# Patient Record
Sex: MALE | URBAN - METROPOLITAN AREA
[De-identification: names, ages, dates, MRNs, and addresses within clinical notes are randomized per-mention and may not be internally consistent; named-entity substitution may affect disease eponyms.]

---

## 2018-09-05 ENCOUNTER — APPOINTMENT (RX ONLY)
Dept: URBAN - METROPOLITAN AREA CLINIC 56 | Facility: CLINIC | Age: 78
Setting detail: DERMATOLOGY
End: 2018-09-05

## 2018-09-05 DIAGNOSIS — L81.4 OTHER MELANIN HYPERPIGMENTATION: ICD-10-CM

## 2018-09-05 DIAGNOSIS — L57.8 OTHER SKIN CHANGES DUE TO CHRONIC EXPOSURE TO NONIONIZING RADIATION: ICD-10-CM

## 2018-09-05 DIAGNOSIS — L82.1 OTHER SEBORRHEIC KERATOSIS: ICD-10-CM

## 2018-09-05 DIAGNOSIS — D22 MELANOCYTIC NEVI: ICD-10-CM

## 2018-09-05 DIAGNOSIS — D18.0 HEMANGIOMA: ICD-10-CM

## 2018-09-05 DIAGNOSIS — Z71.89 OTHER SPECIFIED COUNSELING: ICD-10-CM

## 2018-09-05 PROBLEM — I10 ESSENTIAL (PRIMARY) HYPERTENSION: Status: ACTIVE | Noted: 2018-09-05

## 2018-09-05 PROBLEM — D22.61 MELANOCYTIC NEVI OF RIGHT UPPER LIMB, INCLUDING SHOULDER: Status: ACTIVE | Noted: 2018-09-05

## 2018-09-05 PROBLEM — M12.9 ARTHROPATHY, UNSPECIFIED: Status: ACTIVE | Noted: 2018-09-05

## 2018-09-05 PROBLEM — D18.01 HEMANGIOMA OF SKIN AND SUBCUTANEOUS TISSUE: Status: ACTIVE | Noted: 2018-09-05

## 2018-09-05 PROBLEM — H91.90 UNSPECIFIED HEARING LOSS, UNSPECIFIED EAR: Status: ACTIVE | Noted: 2018-09-05

## 2018-09-05 PROCEDURE — ? COUNSELING

## 2018-09-05 PROCEDURE — 99202 OFFICE O/P NEW SF 15 MIN: CPT

## 2018-09-05 ASSESSMENT — LOCATION DETAILED DESCRIPTION DERM
LOCATION DETAILED: RIGHT SUPERIOR UPPER BACK
LOCATION DETAILED: RIGHT ANTERIOR SHOULDER
LOCATION DETAILED: RIGHT ANTERIOR PROXIMAL UPPER ARM
LOCATION DETAILED: LEFT ANTERIOR SHOULDER
LOCATION DETAILED: EPIGASTRIC SKIN
LOCATION DETAILED: LEFT SUPERIOR UPPER BACK

## 2018-09-05 ASSESSMENT — LOCATION SIMPLE DESCRIPTION DERM
LOCATION SIMPLE: RIGHT UPPER ARM
LOCATION SIMPLE: RIGHT UPPER BACK
LOCATION SIMPLE: LEFT SHOULDER
LOCATION SIMPLE: LEFT UPPER BACK
LOCATION SIMPLE: RIGHT SHOULDER
LOCATION SIMPLE: ABDOMEN

## 2018-09-05 ASSESSMENT — LOCATION ZONE DERM
LOCATION ZONE: TRUNK
LOCATION ZONE: ARM

## 2020-01-21 ENCOUNTER — APPOINTMENT (RX ONLY)
Dept: URBAN - METROPOLITAN AREA CLINIC 58 | Facility: CLINIC | Age: 80
Setting detail: DERMATOLOGY
End: 2020-01-21

## 2020-01-21 DIAGNOSIS — D18.0 HEMANGIOMA: ICD-10-CM

## 2020-01-21 DIAGNOSIS — L57.0 ACTINIC KERATOSIS: ICD-10-CM

## 2020-01-21 DIAGNOSIS — D22 MELANOCYTIC NEVI: ICD-10-CM

## 2020-01-21 DIAGNOSIS — L82.1 OTHER SEBORRHEIC KERATOSIS: ICD-10-CM

## 2020-01-21 DIAGNOSIS — L81.4 OTHER MELANIN HYPERPIGMENTATION: ICD-10-CM

## 2020-01-21 PROBLEM — D18.01 HEMANGIOMA OF SKIN AND SUBCUTANEOUS TISSUE: Status: ACTIVE | Noted: 2020-01-21

## 2020-01-21 PROBLEM — D22.5 MELANOCYTIC NEVI OF TRUNK: Status: ACTIVE | Noted: 2020-01-21

## 2020-01-21 PROCEDURE — ? COUNSELING

## 2020-01-21 PROCEDURE — ? FULL BODY SKIN EXAM

## 2020-01-21 PROCEDURE — ? LIQUID NITROGEN

## 2020-01-21 PROCEDURE — 99213 OFFICE O/P EST LOW 20 MIN: CPT | Mod: 25

## 2020-01-21 PROCEDURE — 17000 DESTRUCT PREMALG LESION: CPT

## 2020-01-21 ASSESSMENT — LOCATION DETAILED DESCRIPTION DERM
LOCATION DETAILED: LEFT VENTRAL PROXIMAL FOREARM
LOCATION DETAILED: LEFT SUPERIOR TEMPLE
LOCATION DETAILED: EPIGASTRIC SKIN
LOCATION DETAILED: LEFT DISTAL PRETIBIAL REGION
LOCATION DETAILED: RIGHT DISTAL PRETIBIAL REGION
LOCATION DETAILED: RIGHT VENTRAL PROXIMAL FOREARM
LOCATION DETAILED: MIDDLE STERNUM

## 2020-01-21 ASSESSMENT — LOCATION ZONE DERM
LOCATION ZONE: FACE
LOCATION ZONE: ARM
LOCATION ZONE: LEG
LOCATION ZONE: TRUNK

## 2020-01-21 ASSESSMENT — LOCATION SIMPLE DESCRIPTION DERM
LOCATION SIMPLE: RIGHT FOREARM
LOCATION SIMPLE: LEFT TEMPLE
LOCATION SIMPLE: RIGHT PRETIBIAL REGION
LOCATION SIMPLE: CHEST
LOCATION SIMPLE: ABDOMEN
LOCATION SIMPLE: LEFT FOREARM
LOCATION SIMPLE: LEFT PRETIBIAL REGION

## 2020-01-21 NOTE — PROCEDURE: MIPS QUALITY
Quality 402: Tobacco Use And Help With Quitting Among Adolescents: Patient screened for tobacco and is an ex-smoker
Quality 110: Preventive Care And Screening: Influenza Immunization: Influenza Immunization Administered during Influenza season
Quality 111:Pneumonia Vaccination Status For Older Adults: Pneumococcal Vaccination Previously Received
Detail Level: Detailed
Quality 47: Advance Care Plan: Advance Care Planning discussed and documented; advance care plan or surrogate decision maker documented in the medical record.
Quality 130: Documentation Of Current Medications In The Medical Record: Current Medications Documented

## 2021-01-01 ENCOUNTER — APPOINTMENT (INPATIENT)
Dept: RADIOLOGY | Facility: HOSPITAL | Age: 81
DRG: 025 | End: 2021-01-01
Payer: MEDICARE

## 2021-01-01 ENCOUNTER — APPOINTMENT (EMERGENCY)
Dept: CT IMAGING | Facility: HOSPITAL | Age: 81
End: 2021-01-01
Payer: MEDICARE

## 2021-01-01 ENCOUNTER — ANESTHESIA EVENT (INPATIENT)
Dept: PERIOP | Facility: HOSPITAL | Age: 81
DRG: 025 | End: 2021-01-01
Payer: MEDICARE

## 2021-01-01 ENCOUNTER — HOSPITAL ENCOUNTER (INPATIENT)
Facility: HOSPITAL | Age: 81
LOS: 25 days | DRG: 025 | End: 2021-12-21
Attending: SURGERY | Admitting: SURGERY
Payer: MEDICARE

## 2021-01-01 ENCOUNTER — HOSPITAL ENCOUNTER (EMERGENCY)
Facility: HOSPITAL | Age: 81
End: 2021-11-26
Attending: EMERGENCY MEDICINE | Admitting: EMERGENCY MEDICINE
Payer: MEDICARE

## 2021-01-01 ENCOUNTER — ANESTHESIA (INPATIENT)
Dept: PERIOP | Facility: HOSPITAL | Age: 81
DRG: 025 | End: 2021-01-01
Payer: MEDICARE

## 2021-01-01 ENCOUNTER — ANESTHESIA EVENT (INPATIENT)
Dept: RADIOLOGY | Facility: HOSPITAL | Age: 81
DRG: 025 | End: 2021-01-01
Payer: MEDICARE

## 2021-01-01 ENCOUNTER — TELEPHONE (OUTPATIENT)
Dept: NEUROSURGERY | Facility: CLINIC | Age: 81
End: 2021-01-01

## 2021-01-01 ENCOUNTER — TELEPHONE (OUTPATIENT)
Dept: OTHER | Facility: OTHER | Age: 81
End: 2021-01-01

## 2021-01-01 ENCOUNTER — APPOINTMENT (INPATIENT)
Dept: RADIOLOGY | Facility: HOSPITAL | Age: 81
DRG: 025 | End: 2021-01-01
Attending: NEUROLOGICAL SURGERY
Payer: MEDICARE

## 2021-01-01 ENCOUNTER — ANESTHESIA (INPATIENT)
Dept: RADIOLOGY | Facility: HOSPITAL | Age: 81
DRG: 025 | End: 2021-01-01
Payer: MEDICARE

## 2021-01-01 ENCOUNTER — APPOINTMENT (EMERGENCY)
Dept: RADIOLOGY | Facility: HOSPITAL | Age: 81
End: 2021-01-01
Payer: MEDICARE

## 2021-01-01 VITALS
RESPIRATION RATE: 15 BRPM | TEMPERATURE: 97.7 F | SYSTOLIC BLOOD PRESSURE: 158 MMHG | HEART RATE: 62 BPM | OXYGEN SATURATION: 97 % | WEIGHT: 190.04 LBS | DIASTOLIC BLOOD PRESSURE: 91 MMHG

## 2021-01-01 VITALS
HEIGHT: 69 IN | SYSTOLIC BLOOD PRESSURE: 144 MMHG | DIASTOLIC BLOOD PRESSURE: 73 MMHG | TEMPERATURE: 96.6 F | BODY MASS INDEX: 22.37 KG/M2 | OXYGEN SATURATION: 98 % | RESPIRATION RATE: 17 BRPM | WEIGHT: 151.01 LBS | HEART RATE: 89 BPM

## 2021-01-01 DIAGNOSIS — R29.6 MULTIPLE FALLS: ICD-10-CM

## 2021-01-01 DIAGNOSIS — Z78.1 REQUIRES RESTRAINTS FOR SAFETY: Primary | ICD-10-CM

## 2021-01-01 DIAGNOSIS — R33.9 URINARY RETENTION: ICD-10-CM

## 2021-01-01 DIAGNOSIS — S06.5X9A SUBDURAL HEMATOMA (HCC): ICD-10-CM

## 2021-01-01 DIAGNOSIS — G93.40 ENCEPHALOPATHY ACUTE: ICD-10-CM

## 2021-01-01 DIAGNOSIS — S06.5X9A SDH (SUBDURAL HEMATOMA) (HCC): Primary | ICD-10-CM

## 2021-01-01 DIAGNOSIS — R31.9 HEMATURIA: ICD-10-CM

## 2021-01-01 LAB
ABO GROUP BLD: NORMAL
ABO GROUP BLD: NORMAL
ALBUMIN SERPL BCP-MCNC: 3.3 G/DL (ref 3.5–5)
ALBUMIN SERPL BCP-MCNC: 3.5 G/DL (ref 3.5–5)
ALP SERPL-CCNC: 91 U/L (ref 46–116)
ALP SERPL-CCNC: 97 U/L (ref 46–116)
ALT SERPL W P-5'-P-CCNC: 28 U/L (ref 12–78)
ALT SERPL W P-5'-P-CCNC: 29 U/L (ref 12–78)
ANION GAP SERPL CALCULATED.3IONS-SCNC: 12 MMOL/L (ref 4–13)
ANION GAP SERPL CALCULATED.3IONS-SCNC: 3 MMOL/L (ref 4–13)
ANION GAP SERPL CALCULATED.3IONS-SCNC: 4 MMOL/L (ref 4–13)
ANION GAP SERPL CALCULATED.3IONS-SCNC: 4 MMOL/L (ref 4–13)
ANION GAP SERPL CALCULATED.3IONS-SCNC: 6 MMOL/L (ref 4–13)
ANION GAP SERPL CALCULATED.3IONS-SCNC: 7 MMOL/L (ref 4–13)
ANION GAP SERPL CALCULATED.3IONS-SCNC: 8 MMOL/L (ref 4–13)
ANION GAP SERPL CALCULATED.3IONS-SCNC: 9 MMOL/L (ref 4–13)
ANION GAP SERPL CALCULATED.3IONS-SCNC: 9 MMOL/L (ref 4–13)
APTT PPP: 24 SECONDS (ref 23–37)
APTT PPP: 29 SECONDS (ref 23–37)
APTT PPP: 30 SECONDS (ref 23–37)
AST SERPL W P-5'-P-CCNC: 28 U/L (ref 5–45)
AST SERPL W P-5'-P-CCNC: 28 U/L (ref 5–45)
ATRIAL RATE: 115 BPM
ATRIAL RATE: 51 BPM
ATRIAL RATE: 67 BPM
ATRIAL RATE: 68 BPM
ATRIAL RATE: 75 BPM
ATRIAL RATE: 90 BPM
ATRIAL RATE: 93 BPM
BACTERIA UR QL AUTO: ABNORMAL /HPF
BASOPHILS # BLD AUTO: 0.01 THOUSANDS/ΜL (ref 0–0.1)
BASOPHILS # BLD AUTO: 0.02 THOUSANDS/ΜL (ref 0–0.1)
BASOPHILS # BLD AUTO: 0.04 THOUSANDS/ΜL (ref 0–0.1)
BASOPHILS # BLD AUTO: 0.04 THOUSANDS/ΜL (ref 0–0.1)
BASOPHILS # BLD AUTO: 0.05 THOUSANDS/ΜL (ref 0–0.1)
BASOPHILS # BLD AUTO: 0.08 THOUSANDS/ΜL (ref 0–0.1)
BASOPHILS NFR BLD AUTO: 0 % (ref 0–1)
BASOPHILS NFR BLD AUTO: 1 % (ref 0–1)
BILIRUB SERPL-MCNC: 0.68 MG/DL (ref 0.2–1)
BILIRUB SERPL-MCNC: 0.74 MG/DL (ref 0.2–1)
BILIRUB UR QL STRIP: ABNORMAL
BILIRUB UR QL STRIP: ABNORMAL
BILIRUB UR QL STRIP: NEGATIVE
BLD GP AB SCN SERPL QL: NEGATIVE
BUN SERPL-MCNC: 21 MG/DL (ref 5–25)
BUN SERPL-MCNC: 21 MG/DL (ref 5–25)
BUN SERPL-MCNC: 22 MG/DL (ref 5–25)
BUN SERPL-MCNC: 24 MG/DL (ref 5–25)
BUN SERPL-MCNC: 26 MG/DL (ref 5–25)
BUN SERPL-MCNC: 27 MG/DL (ref 5–25)
BUN SERPL-MCNC: 6 MG/DL (ref 5–25)
BUN SERPL-MCNC: 7 MG/DL (ref 5–25)
BUN SERPL-MCNC: 7 MG/DL (ref 5–25)
BUN SERPL-MCNC: 8 MG/DL (ref 5–25)
BUN SERPL-MCNC: 9 MG/DL (ref 5–25)
CA-I BLD-SCNC: 1.05 MMOL/L (ref 1.12–1.32)
CA-I BLD-SCNC: 1.05 MMOL/L (ref 1.12–1.32)
CALCIUM ALBUM COR SERPL-MCNC: 8.9 MG/DL (ref 8.3–10.1)
CALCIUM SERPL-MCNC: 8.1 MG/DL (ref 8.3–10.1)
CALCIUM SERPL-MCNC: 8.3 MG/DL (ref 8.3–10.1)
CALCIUM SERPL-MCNC: 8.3 MG/DL (ref 8.3–10.1)
CALCIUM SERPL-MCNC: 8.4 MG/DL (ref 8.3–10.1)
CALCIUM SERPL-MCNC: 8.7 MG/DL (ref 8.3–10.1)
CALCIUM SERPL-MCNC: 8.7 MG/DL (ref 8.3–10.1)
CALCIUM SERPL-MCNC: 8.8 MG/DL (ref 8.3–10.1)
CALCIUM SERPL-MCNC: 9 MG/DL (ref 8.3–10.1)
CALCIUM SERPL-MCNC: 9.2 MG/DL (ref 8.3–10.1)
CALCIUM SERPL-MCNC: 9.3 MG/DL (ref 8.3–10.1)
CALCIUM SERPL-MCNC: 9.4 MG/DL (ref 8.3–10.1)
CALCIUM SERPL-MCNC: 9.6 MG/DL (ref 8.3–10.1)
CALCIUM SERPL-MCNC: 9.6 MG/DL (ref 8.3–10.1)
CARDIAC TROPONIN I PNL SERPL HS: 7 NG/L (ref 8–18)
CHLORIDE SERPL-SCNC: 106 MMOL/L (ref 100–108)
CHLORIDE SERPL-SCNC: 107 MMOL/L (ref 100–108)
CHLORIDE SERPL-SCNC: 107 MMOL/L (ref 100–108)
CHLORIDE SERPL-SCNC: 109 MMOL/L (ref 100–108)
CHLORIDE SERPL-SCNC: 110 MMOL/L (ref 100–108)
CHLORIDE SERPL-SCNC: 112 MMOL/L (ref 100–108)
CHLORIDE SERPL-SCNC: 113 MMOL/L (ref 100–108)
CHLORIDE SERPL-SCNC: 113 MMOL/L (ref 100–108)
CK SERPL-CCNC: 36 U/L (ref 39–308)
CLARITY UR: ABNORMAL
CLARITY UR: ABNORMAL
CLARITY UR: CLEAR
CO2 SERPL-SCNC: 16 MMOL/L (ref 21–32)
CO2 SERPL-SCNC: 22 MMOL/L (ref 21–32)
CO2 SERPL-SCNC: 22 MMOL/L (ref 21–32)
CO2 SERPL-SCNC: 23 MMOL/L (ref 21–32)
CO2 SERPL-SCNC: 23 MMOL/L (ref 21–32)
CO2 SERPL-SCNC: 25 MMOL/L (ref 21–32)
CO2 SERPL-SCNC: 26 MMOL/L (ref 21–32)
CO2 SERPL-SCNC: 27 MMOL/L (ref 21–32)
CO2 SERPL-SCNC: 28 MMOL/L (ref 21–32)
CO2 SERPL-SCNC: 29 MMOL/L (ref 21–32)
COLOR UR: ABNORMAL
COLOR UR: ABNORMAL
COLOR UR: YELLOW
CREAT SERPL-MCNC: 0.65 MG/DL (ref 0.6–1.3)
CREAT SERPL-MCNC: 0.66 MG/DL (ref 0.6–1.3)
CREAT SERPL-MCNC: 0.78 MG/DL (ref 0.6–1.3)
CREAT SERPL-MCNC: 0.82 MG/DL (ref 0.6–1.3)
CREAT SERPL-MCNC: 0.83 MG/DL (ref 0.6–1.3)
CREAT SERPL-MCNC: 0.84 MG/DL (ref 0.6–1.3)
CREAT SERPL-MCNC: 0.85 MG/DL (ref 0.6–1.3)
CREAT SERPL-MCNC: 0.86 MG/DL (ref 0.6–1.3)
CREAT SERPL-MCNC: 0.86 MG/DL (ref 0.6–1.3)
CREAT SERPL-MCNC: 0.87 MG/DL (ref 0.6–1.3)
CREAT SERPL-MCNC: 0.96 MG/DL (ref 0.6–1.3)
CREAT SERPL-MCNC: 0.96 MG/DL (ref 0.6–1.3)
CREAT SERPL-MCNC: 0.97 MG/DL (ref 0.6–1.3)
CRP SERPL QL: 132 MG/L
CRP SERPL QL: 160 MG/L
EOSINOPHIL # BLD AUTO: 0 THOUSAND/ΜL (ref 0–0.61)
EOSINOPHIL # BLD AUTO: 0.01 THOUSAND/ΜL (ref 0–0.61)
EOSINOPHIL # BLD AUTO: 0.04 THOUSAND/ΜL (ref 0–0.61)
EOSINOPHIL # BLD AUTO: 0.15 THOUSAND/ΜL (ref 0–0.61)
EOSINOPHIL # BLD AUTO: 0.18 THOUSAND/ΜL (ref 0–0.61)
EOSINOPHIL # BLD AUTO: 0.22 THOUSAND/ΜL (ref 0–0.61)
EOSINOPHIL NFR BLD AUTO: 0 % (ref 0–6)
EOSINOPHIL NFR BLD AUTO: 2 % (ref 0–6)
EOSINOPHIL NFR BLD AUTO: 3 % (ref 0–6)
EOSINOPHIL NFR BLD AUTO: 3 % (ref 0–6)
ERYTHROCYTE [DISTWIDTH] IN BLOOD BY AUTOMATED COUNT: 13.3 % (ref 11.6–15.1)
ERYTHROCYTE [DISTWIDTH] IN BLOOD BY AUTOMATED COUNT: 13.4 % (ref 11.6–15.1)
ERYTHROCYTE [DISTWIDTH] IN BLOOD BY AUTOMATED COUNT: 13.5 % (ref 11.6–15.1)
ERYTHROCYTE [DISTWIDTH] IN BLOOD BY AUTOMATED COUNT: 13.6 % (ref 11.6–15.1)
ERYTHROCYTE [DISTWIDTH] IN BLOOD BY AUTOMATED COUNT: 13.7 % (ref 11.6–15.1)
ERYTHROCYTE [DISTWIDTH] IN BLOOD BY AUTOMATED COUNT: 13.7 % (ref 11.6–15.1)
FLUAV RNA RESP QL NAA+PROBE: NEGATIVE
FLUAV RNA RESP QL NAA+PROBE: NEGATIVE
FLUBV RNA RESP QL NAA+PROBE: NEGATIVE
FLUBV RNA RESP QL NAA+PROBE: NEGATIVE
GFR SERPL CREATININE-BSD FRML MDRD: 74 ML/MIN/1.73SQ M
GFR SERPL CREATININE-BSD FRML MDRD: 74 ML/MIN/1.73SQ M
GFR SERPL CREATININE-BSD FRML MDRD: 75 ML/MIN/1.73SQ M
GFR SERPL CREATININE-BSD FRML MDRD: 82 ML/MIN/1.73SQ M
GFR SERPL CREATININE-BSD FRML MDRD: 83 ML/MIN/1.73SQ M
GFR SERPL CREATININE-BSD FRML MDRD: 84 ML/MIN/1.73SQ M
GFR SERPL CREATININE-BSD FRML MDRD: 86 ML/MIN/1.73SQ M
GFR SERPL CREATININE-BSD FRML MDRD: 92 ML/MIN/1.73SQ M
GFR SERPL CREATININE-BSD FRML MDRD: 93 ML/MIN/1.73SQ M
GLUCOSE SERPL-MCNC: 109 MG/DL (ref 65–140)
GLUCOSE SERPL-MCNC: 111 MG/DL (ref 65–140)
GLUCOSE SERPL-MCNC: 113 MG/DL (ref 65–140)
GLUCOSE SERPL-MCNC: 119 MG/DL (ref 65–140)
GLUCOSE SERPL-MCNC: 122 MG/DL (ref 65–140)
GLUCOSE SERPL-MCNC: 77 MG/DL (ref 65–140)
GLUCOSE SERPL-MCNC: 83 MG/DL (ref 65–140)
GLUCOSE SERPL-MCNC: 85 MG/DL (ref 65–140)
GLUCOSE SERPL-MCNC: 87 MG/DL (ref 65–140)
GLUCOSE SERPL-MCNC: 90 MG/DL (ref 65–140)
GLUCOSE SERPL-MCNC: 96 MG/DL (ref 65–140)
GLUCOSE SERPL-MCNC: 97 MG/DL (ref 65–140)
GLUCOSE SERPL-MCNC: 98 MG/DL (ref 65–140)
GLUCOSE UR STRIP-MCNC: ABNORMAL MG/DL
GLUCOSE UR STRIP-MCNC: NEGATIVE MG/DL
GLUCOSE UR STRIP-MCNC: NEGATIVE MG/DL
HCT VFR BLD AUTO: 32.1 % (ref 36.5–49.3)
HCT VFR BLD AUTO: 34 % (ref 36.5–49.3)
HCT VFR BLD AUTO: 34.2 % (ref 36.5–49.3)
HCT VFR BLD AUTO: 34.9 % (ref 36.5–49.3)
HCT VFR BLD AUTO: 35.4 % (ref 36.5–49.3)
HCT VFR BLD AUTO: 36 % (ref 36.5–49.3)
HCT VFR BLD AUTO: 36.7 % (ref 36.5–49.3)
HCT VFR BLD AUTO: 36.8 % (ref 36.5–49.3)
HCT VFR BLD AUTO: 37.1 % (ref 36.5–49.3)
HCT VFR BLD AUTO: 37.5 % (ref 36.5–49.3)
HCT VFR BLD AUTO: 38 % (ref 36.5–49.3)
HGB BLD-MCNC: 10.6 G/DL (ref 12–17)
HGB BLD-MCNC: 10.9 G/DL (ref 12–17)
HGB BLD-MCNC: 11.4 G/DL (ref 12–17)
HGB BLD-MCNC: 11.5 G/DL (ref 12–17)
HGB BLD-MCNC: 11.5 G/DL (ref 12–17)
HGB BLD-MCNC: 11.6 G/DL (ref 12–17)
HGB BLD-MCNC: 11.7 G/DL (ref 12–17)
HGB BLD-MCNC: 11.8 G/DL (ref 12–17)
HGB BLD-MCNC: 12 G/DL (ref 12–17)
HGB BLD-MCNC: 12.1 G/DL (ref 12–17)
HGB BLD-MCNC: 12.4 G/DL (ref 12–17)
HGB UR QL STRIP.AUTO: ABNORMAL
HGB UR QL STRIP.AUTO: ABNORMAL
HGB UR QL STRIP.AUTO: NEGATIVE
IMM GRANULOCYTES # BLD AUTO: 0.01 THOUSAND/UL (ref 0–0.2)
IMM GRANULOCYTES # BLD AUTO: 0.01 THOUSAND/UL (ref 0–0.2)
IMM GRANULOCYTES # BLD AUTO: 0.04 THOUSAND/UL (ref 0–0.2)
IMM GRANULOCYTES # BLD AUTO: 0.04 THOUSAND/UL (ref 0–0.2)
IMM GRANULOCYTES # BLD AUTO: 0.06 THOUSAND/UL (ref 0–0.2)
IMM GRANULOCYTES # BLD AUTO: 0.07 THOUSAND/UL (ref 0–0.2)
IMM GRANULOCYTES # BLD AUTO: 0.08 THOUSAND/UL (ref 0–0.2)
IMM GRANULOCYTES # BLD AUTO: 0.09 THOUSAND/UL (ref 0–0.2)
IMM GRANULOCYTES NFR BLD AUTO: 0 % (ref 0–2)
IMM GRANULOCYTES NFR BLD AUTO: 1 % (ref 0–2)
INR PPP: 1.03 (ref 0.84–1.19)
INR PPP: 1.11 (ref 0.84–1.19)
INR PPP: 1.11 (ref 0.84–1.19)
KETONES UR STRIP-MCNC: ABNORMAL MG/DL
KETONES UR STRIP-MCNC: ABNORMAL MG/DL
KETONES UR STRIP-MCNC: NEGATIVE MG/DL
LEUKOCYTE ESTERASE UR QL STRIP: ABNORMAL
LEUKOCYTE ESTERASE UR QL STRIP: ABNORMAL
LEUKOCYTE ESTERASE UR QL STRIP: NEGATIVE
LYMPHOCYTES # BLD AUTO: 0.58 THOUSANDS/ΜL (ref 0.6–4.47)
LYMPHOCYTES # BLD AUTO: 0.6 THOUSANDS/ΜL (ref 0.6–4.47)
LYMPHOCYTES # BLD AUTO: 0.61 THOUSANDS/ΜL (ref 0.6–4.47)
LYMPHOCYTES # BLD AUTO: 0.69 THOUSANDS/ΜL (ref 0.6–4.47)
LYMPHOCYTES # BLD AUTO: 0.69 THOUSANDS/ΜL (ref 0.6–4.47)
LYMPHOCYTES # BLD AUTO: 1.11 THOUSANDS/ΜL (ref 0.6–4.47)
LYMPHOCYTES # BLD AUTO: 1.41 THOUSANDS/ΜL (ref 0.6–4.47)
LYMPHOCYTES # BLD AUTO: 1.55 THOUSANDS/ΜL (ref 0.6–4.47)
LYMPHOCYTES NFR BLD AUTO: 17 % (ref 14–44)
LYMPHOCYTES NFR BLD AUTO: 24 % (ref 14–44)
LYMPHOCYTES NFR BLD AUTO: 24 % (ref 14–44)
LYMPHOCYTES NFR BLD AUTO: 5 % (ref 14–44)
LYMPHOCYTES NFR BLD AUTO: 5 % (ref 14–44)
LYMPHOCYTES NFR BLD AUTO: 6 % (ref 14–44)
LYMPHOCYTES NFR BLD AUTO: 7 % (ref 14–44)
LYMPHOCYTES NFR BLD AUTO: 8 % (ref 14–44)
MAGNESIUM SERPL-MCNC: 1.8 MG/DL (ref 1.6–2.6)
MAGNESIUM SERPL-MCNC: 1.9 MG/DL (ref 1.6–2.6)
MAGNESIUM SERPL-MCNC: 2 MG/DL (ref 1.6–2.6)
MAGNESIUM SERPL-MCNC: 2.3 MG/DL (ref 1.6–2.6)
MAGNESIUM SERPL-MCNC: 2.4 MG/DL (ref 1.6–2.6)
MCH RBC QN AUTO: 29.1 PG (ref 26.8–34.3)
MCH RBC QN AUTO: 29.3 PG (ref 26.8–34.3)
MCH RBC QN AUTO: 29.5 PG (ref 26.8–34.3)
MCH RBC QN AUTO: 29.6 PG (ref 26.8–34.3)
MCH RBC QN AUTO: 29.7 PG (ref 26.8–34.3)
MCH RBC QN AUTO: 29.8 PG (ref 26.8–34.3)
MCH RBC QN AUTO: 29.9 PG (ref 26.8–34.3)
MCH RBC QN AUTO: 29.9 PG (ref 26.8–34.3)
MCH RBC QN AUTO: 30 PG (ref 26.8–34.3)
MCH RBC QN AUTO: 30 PG (ref 26.8–34.3)
MCH RBC QN AUTO: 30.1 PG (ref 26.8–34.3)
MCH RBC QN AUTO: 30.1 PG (ref 26.8–34.3)
MCH RBC QN AUTO: 30.2 PG (ref 26.8–34.3)
MCHC RBC AUTO-ENTMCNC: 32.1 G/DL (ref 31.4–37.4)
MCHC RBC AUTO-ENTMCNC: 32.2 G/DL (ref 31.4–37.4)
MCHC RBC AUTO-ENTMCNC: 32.3 G/DL (ref 31.4–37.4)
MCHC RBC AUTO-ENTMCNC: 32.3 G/DL (ref 31.4–37.4)
MCHC RBC AUTO-ENTMCNC: 32.5 G/DL (ref 31.4–37.4)
MCHC RBC AUTO-ENTMCNC: 32.6 G/DL (ref 31.4–37.4)
MCHC RBC AUTO-ENTMCNC: 32.6 G/DL (ref 31.4–37.4)
MCHC RBC AUTO-ENTMCNC: 32.7 G/DL (ref 31.4–37.4)
MCHC RBC AUTO-ENTMCNC: 32.8 G/DL (ref 31.4–37.4)
MCHC RBC AUTO-ENTMCNC: 32.8 G/DL (ref 31.4–37.4)
MCHC RBC AUTO-ENTMCNC: 33 G/DL (ref 31.4–37.4)
MCHC RBC AUTO-ENTMCNC: 33.5 G/DL (ref 31.4–37.4)
MCHC RBC AUTO-ENTMCNC: 33.6 G/DL (ref 31.4–37.4)
MCV RBC AUTO: 90 FL (ref 82–98)
MCV RBC AUTO: 91 FL (ref 82–98)
MCV RBC AUTO: 92 FL (ref 82–98)
MONOCYTES # BLD AUTO: 0.52 THOUSAND/ΜL (ref 0.17–1.22)
MONOCYTES # BLD AUTO: 0.55 THOUSAND/ΜL (ref 0.17–1.22)
MONOCYTES # BLD AUTO: 0.58 THOUSAND/ΜL (ref 0.17–1.22)
MONOCYTES # BLD AUTO: 0.62 THOUSAND/ΜL (ref 0.17–1.22)
MONOCYTES # BLD AUTO: 0.68 THOUSAND/ΜL (ref 0.17–1.22)
MONOCYTES # BLD AUTO: 0.72 THOUSAND/ΜL (ref 0.17–1.22)
MONOCYTES # BLD AUTO: 0.95 THOUSAND/ΜL (ref 0.17–1.22)
MONOCYTES # BLD AUTO: 0.96 THOUSAND/ΜL (ref 0.17–1.22)
MONOCYTES NFR BLD AUTO: 10 % (ref 4–12)
MONOCYTES NFR BLD AUTO: 11 % (ref 4–12)
MONOCYTES NFR BLD AUTO: 11 % (ref 4–12)
MONOCYTES NFR BLD AUTO: 5 % (ref 4–12)
MONOCYTES NFR BLD AUTO: 5 % (ref 4–12)
MONOCYTES NFR BLD AUTO: 7 % (ref 4–12)
MONOCYTES NFR BLD AUTO: 8 % (ref 4–12)
MONOCYTES NFR BLD AUTO: 8 % (ref 4–12)
NEUTROPHILS # BLD AUTO: 10.6 THOUSANDS/ΜL (ref 1.85–7.62)
NEUTROPHILS # BLD AUTO: 11.94 THOUSANDS/ΜL (ref 1.85–7.62)
NEUTROPHILS # BLD AUTO: 2.87 THOUSANDS/ΜL (ref 1.85–7.62)
NEUTROPHILS # BLD AUTO: 3.65 THOUSANDS/ΜL (ref 1.85–7.62)
NEUTROPHILS # BLD AUTO: 6.4 THOUSANDS/ΜL (ref 1.85–7.62)
NEUTROPHILS # BLD AUTO: 7.01 THOUSANDS/ΜL (ref 1.85–7.62)
NEUTROPHILS # BLD AUTO: 7.6 THOUSANDS/ΜL (ref 1.85–7.62)
NEUTROPHILS # BLD AUTO: 9.03 THOUSANDS/ΜL (ref 1.85–7.62)
NEUTS SEG NFR BLD AUTO: 61 % (ref 43–75)
NEUTS SEG NFR BLD AUTO: 61 % (ref 43–75)
NEUTS SEG NFR BLD AUTO: 71 % (ref 43–75)
NEUTS SEG NFR BLD AUTO: 83 % (ref 43–75)
NEUTS SEG NFR BLD AUTO: 84 % (ref 43–75)
NEUTS SEG NFR BLD AUTO: 86 % (ref 43–75)
NEUTS SEG NFR BLD AUTO: 88 % (ref 43–75)
NEUTS SEG NFR BLD AUTO: 89 % (ref 43–75)
NITRITE UR QL STRIP: ABNORMAL
NITRITE UR QL STRIP: NEGATIVE
NITRITE UR QL STRIP: POSITIVE
NON-SQ EPI CELLS URNS QL MICRO: ABNORMAL /HPF
NRBC BLD AUTO-RTO: 0 /100 WBCS
NT-PROBNP SERPL-MCNC: 639 PG/ML
P AXIS: -5 DEGREES
P AXIS: 0 DEGREES
P AXIS: 17 DEGREES
P AXIS: 23 DEGREES
P AXIS: 6 DEGREES
PH UR STRIP.AUTO: 5.5 [PH]
PH UR STRIP.AUTO: 6 [PH]
PH UR STRIP.AUTO: ABNORMAL [PH]
PHOSPHATE SERPL-MCNC: 1.6 MG/DL (ref 2.3–4.1)
PHOSPHATE SERPL-MCNC: 2.1 MG/DL (ref 2.3–4.1)
PHOSPHATE SERPL-MCNC: 3.2 MG/DL (ref 2.3–4.1)
PHOSPHATE SERPL-MCNC: 3.3 MG/DL (ref 2.3–4.1)
PLATELET # BLD AUTO: 172 THOUSANDS/UL (ref 149–390)
PLATELET # BLD AUTO: 183 THOUSANDS/UL (ref 149–390)
PLATELET # BLD AUTO: 188 THOUSANDS/UL (ref 149–390)
PLATELET # BLD AUTO: 201 THOUSANDS/UL (ref 149–390)
PLATELET # BLD AUTO: 209 THOUSANDS/UL (ref 149–390)
PLATELET # BLD AUTO: 214 THOUSANDS/UL (ref 149–390)
PLATELET # BLD AUTO: 221 THOUSANDS/UL (ref 149–390)
PLATELET # BLD AUTO: 270 THOUSANDS/UL (ref 149–390)
PLATELET # BLD AUTO: 271 THOUSANDS/UL (ref 149–390)
PLATELET # BLD AUTO: 273 THOUSANDS/UL (ref 149–390)
PLATELET # BLD AUTO: 279 THOUSANDS/UL (ref 149–390)
PLATELET # BLD AUTO: 280 THOUSANDS/UL (ref 149–390)
PLATELET # BLD AUTO: 295 THOUSANDS/UL (ref 149–390)
PMV BLD AUTO: 10.3 FL (ref 8.9–12.7)
PMV BLD AUTO: 10.4 FL (ref 8.9–12.7)
PMV BLD AUTO: 10.6 FL (ref 8.9–12.7)
PMV BLD AUTO: 10.7 FL (ref 8.9–12.7)
PMV BLD AUTO: 10.7 FL (ref 8.9–12.7)
PMV BLD AUTO: 10.8 FL (ref 8.9–12.7)
PMV BLD AUTO: 10.9 FL (ref 8.9–12.7)
PMV BLD AUTO: 10.9 FL (ref 8.9–12.7)
PMV BLD AUTO: 11 FL (ref 8.9–12.7)
PMV BLD AUTO: 11 FL (ref 8.9–12.7)
PMV BLD AUTO: 11.1 FL (ref 8.9–12.7)
PMV BLD AUTO: 11.3 FL (ref 8.9–12.7)
PMV BLD AUTO: 11.4 FL (ref 8.9–12.7)
POTASSIUM SERPL-SCNC: 3.1 MMOL/L (ref 3.5–5.3)
POTASSIUM SERPL-SCNC: 3.3 MMOL/L (ref 3.5–5.3)
POTASSIUM SERPL-SCNC: 3.4 MMOL/L (ref 3.5–5.3)
POTASSIUM SERPL-SCNC: 3.5 MMOL/L (ref 3.5–5.3)
POTASSIUM SERPL-SCNC: 3.6 MMOL/L (ref 3.5–5.3)
POTASSIUM SERPL-SCNC: 3.6 MMOL/L (ref 3.5–5.3)
POTASSIUM SERPL-SCNC: 3.7 MMOL/L (ref 3.5–5.3)
POTASSIUM SERPL-SCNC: 3.9 MMOL/L (ref 3.5–5.3)
PR INTERVAL: 130 MS
PR INTERVAL: 138 MS
PR INTERVAL: 140 MS
PR INTERVAL: 140 MS
PR INTERVAL: 146 MS
PR INTERVAL: 182 MS
PROT SERPL-MCNC: 6.5 G/DL (ref 6.4–8.2)
PROT SERPL-MCNC: 7.3 G/DL (ref 6.4–8.2)
PROT UR STRIP-MCNC: ABNORMAL MG/DL
PROT UR STRIP-MCNC: ABNORMAL MG/DL
PROT UR STRIP-MCNC: NEGATIVE MG/DL
PROTHROMBIN TIME: 13.1 SECONDS (ref 11.6–14.5)
PROTHROMBIN TIME: 13.9 SECONDS (ref 11.6–14.5)
PROTHROMBIN TIME: 13.9 SECONDS (ref 11.6–14.5)
QRS AXIS: 20 DEGREES
QRS AXIS: 21 DEGREES
QRS AXIS: 22 DEGREES
QRS AXIS: 24 DEGREES
QRS AXIS: 4 DEGREES
QRS AXIS: 40 DEGREES
QRS AXIS: 49 DEGREES
QRSD INTERVAL: 82 MS
QRSD INTERVAL: 84 MS
QRSD INTERVAL: 86 MS
QRSD INTERVAL: 88 MS
QRSD INTERVAL: 90 MS
QT INTERVAL: 368 MS
QT INTERVAL: 376 MS
QT INTERVAL: 388 MS
QT INTERVAL: 420 MS
QT INTERVAL: 424 MS
QT INTERVAL: 426 MS
QT INTERVAL: 470 MS
QTC INTERVAL: 433 MS
QTC INTERVAL: 446 MS
QTC INTERVAL: 448 MS
QTC INTERVAL: 457 MS
QTC INTERVAL: 474 MS
QTC INTERVAL: 475 MS
QTC INTERVAL: 485 MS
RBC # BLD AUTO: 3.54 MILLION/UL (ref 3.88–5.62)
RBC # BLD AUTO: 3.69 MILLION/UL (ref 3.88–5.62)
RBC # BLD AUTO: 3.81 MILLION/UL (ref 3.88–5.62)
RBC # BLD AUTO: 3.85 MILLION/UL (ref 3.88–5.62)
RBC # BLD AUTO: 3.85 MILLION/UL (ref 3.88–5.62)
RBC # BLD AUTO: 3.89 MILLION/UL (ref 3.88–5.62)
RBC # BLD AUTO: 3.9 MILLION/UL (ref 3.88–5.62)
RBC # BLD AUTO: 3.92 MILLION/UL (ref 3.88–5.62)
RBC # BLD AUTO: 4.03 MILLION/UL (ref 3.88–5.62)
RBC # BLD AUTO: 4.04 MILLION/UL (ref 3.88–5.62)
RBC # BLD AUTO: 4.05 MILLION/UL (ref 3.88–5.62)
RBC # BLD AUTO: 4.14 MILLION/UL (ref 3.88–5.62)
RBC # BLD AUTO: 4.16 MILLION/UL (ref 3.88–5.62)
RBC #/AREA URNS AUTO: ABNORMAL /HPF
RBC #/AREA URNS AUTO: ABNORMAL /HPF
RH BLD: POSITIVE
RH BLD: POSITIVE
RSV RNA RESP QL NAA+PROBE: NEGATIVE
RSV RNA RESP QL NAA+PROBE: NEGATIVE
SARS-COV-2 RNA RESP QL NAA+PROBE: NEGATIVE
SARS-COV-2 RNA RESP QL NAA+PROBE: POSITIVE
SODIUM SERPL-SCNC: 138 MMOL/L (ref 136–145)
SODIUM SERPL-SCNC: 139 MMOL/L (ref 136–145)
SODIUM SERPL-SCNC: 139 MMOL/L (ref 136–145)
SODIUM SERPL-SCNC: 141 MMOL/L (ref 136–145)
SODIUM SERPL-SCNC: 141 MMOL/L (ref 136–145)
SODIUM SERPL-SCNC: 142 MMOL/L (ref 136–145)
SODIUM SERPL-SCNC: 143 MMOL/L (ref 136–145)
SP GR UR STRIP.AUTO: 1.01 (ref 1–1.03)
SP GR UR STRIP.AUTO: 1.02 (ref 1–1.03)
SP GR UR STRIP.AUTO: 1.03 (ref 1–1.03)
SPECIMEN EXPIRATION DATE: NORMAL
T WAVE AXIS: 17 DEGREES
T WAVE AXIS: 23 DEGREES
T WAVE AXIS: 27 DEGREES
T WAVE AXIS: 30 DEGREES
T WAVE AXIS: 37 DEGREES
T WAVE AXIS: 5 DEGREES
T WAVE AXIS: 53 DEGREES
TSH SERPL DL<=0.05 MIU/L-ACNC: 1.05 UIU/ML (ref 0.36–3.74)
UROBILINOGEN UR QL STRIP.AUTO: 0.2 E.U./DL
UROBILINOGEN UR QL STRIP.AUTO: 4 E.U./DL
UROBILINOGEN UR QL STRIP.AUTO: ABNORMAL E.U./DL
VENTRICULAR RATE: 100 BPM
VENTRICULAR RATE: 51 BPM
VENTRICULAR RATE: 67 BPM
VENTRICULAR RATE: 68 BPM
VENTRICULAR RATE: 75 BPM
VENTRICULAR RATE: 90 BPM
VENTRICULAR RATE: 93 BPM
VIT B12 SERPL-MCNC: 329 PG/ML (ref 100–900)
WBC # BLD AUTO: 10.27 THOUSAND/UL (ref 4.31–10.16)
WBC # BLD AUTO: 11.32 THOUSAND/UL (ref 4.31–10.16)
WBC # BLD AUTO: 12.21 THOUSAND/UL (ref 4.31–10.16)
WBC # BLD AUTO: 13.37 THOUSAND/UL (ref 4.31–10.16)
WBC # BLD AUTO: 14.54 THOUSAND/UL (ref 4.31–10.16)
WBC # BLD AUTO: 14.92 THOUSAND/UL (ref 4.31–10.16)
WBC # BLD AUTO: 4.7 THOUSAND/UL (ref 4.31–10.16)
WBC # BLD AUTO: 5.92 THOUSAND/UL (ref 4.31–10.16)
WBC # BLD AUTO: 6.5 THOUSAND/UL (ref 4.31–10.16)
WBC # BLD AUTO: 6.63 THOUSAND/UL (ref 4.31–10.16)
WBC # BLD AUTO: 8.34 THOUSAND/UL (ref 4.31–10.16)
WBC # BLD AUTO: 9.01 THOUSAND/UL (ref 4.31–10.16)
WBC # BLD AUTO: 9.36 THOUSAND/UL (ref 4.31–10.16)
WBC #/AREA URNS AUTO: ABNORMAL /HPF

## 2021-01-01 PROCEDURE — 86140 C-REACTIVE PROTEIN: CPT | Performed by: PHYSICIAN ASSISTANT

## 2021-01-01 PROCEDURE — 99024 POSTOP FOLLOW-UP VISIT: CPT | Performed by: PHYSICIAN ASSISTANT

## 2021-01-01 PROCEDURE — 80048 BASIC METABOLIC PNL TOTAL CA: CPT | Performed by: NURSE PRACTITIONER

## 2021-01-01 PROCEDURE — 83735 ASSAY OF MAGNESIUM: CPT | Performed by: PHYSICIAN ASSISTANT

## 2021-01-01 PROCEDURE — 80048 BASIC METABOLIC PNL TOTAL CA: CPT | Performed by: PHYSICIAN ASSISTANT

## 2021-01-01 PROCEDURE — 0T9B70Z DRAINAGE OF BLADDER WITH DRAINAGE DEVICE, VIA NATURAL OR ARTIFICIAL OPENING: ICD-10-PCS | Performed by: EMERGENCY MEDICINE

## 2021-01-01 PROCEDURE — G1004 CDSM NDSC: HCPCS

## 2021-01-01 PROCEDURE — 85610 PROTHROMBIN TIME: CPT | Performed by: EMERGENCY MEDICINE

## 2021-01-01 PROCEDURE — 97530 THERAPEUTIC ACTIVITIES: CPT

## 2021-01-01 PROCEDURE — C1769 GUIDE WIRE: HCPCS

## 2021-01-01 PROCEDURE — C1713 ANCHOR/SCREW BN/BN,TIS/BN: HCPCS | Performed by: STUDENT IN AN ORGANIZED HEALTH CARE EDUCATION/TRAINING PROGRAM

## 2021-01-01 PROCEDURE — 84100 ASSAY OF PHOSPHORUS: CPT | Performed by: PHYSICIAN ASSISTANT

## 2021-01-01 PROCEDURE — 93005 ELECTROCARDIOGRAM TRACING: CPT

## 2021-01-01 PROCEDURE — 99233 SBSQ HOSP IP/OBS HIGH 50: CPT | Performed by: INTERNAL MEDICINE

## 2021-01-01 PROCEDURE — 74230 X-RAY XM SWLNG FUNCJ C+: CPT

## 2021-01-01 PROCEDURE — 03LM3DZ OCCLUSION OF RIGHT EXTERNAL CAROTID ARTERY WITH INTRALUMINAL DEVICE, PERCUTANEOUS APPROACH: ICD-10-PCS | Performed by: NEUROLOGICAL SURGERY

## 2021-01-01 PROCEDURE — 99232 SBSQ HOSP IP/OBS MODERATE 35: CPT | Performed by: SURGERY

## 2021-01-01 PROCEDURE — 85025 COMPLETE CBC W/AUTO DIFF WBC: CPT | Performed by: PHYSICIAN ASSISTANT

## 2021-01-01 PROCEDURE — 51702 INSERT TEMP BLADDER CATH: CPT | Performed by: PHYSICIAN ASSISTANT

## 2021-01-01 PROCEDURE — 97116 GAIT TRAINING THERAPY: CPT

## 2021-01-01 PROCEDURE — 99285 EMERGENCY DEPT VISIT HI MDM: CPT

## 2021-01-01 PROCEDURE — 99291 CRITICAL CARE FIRST HOUR: CPT | Performed by: EMERGENCY MEDICINE

## 2021-01-01 PROCEDURE — 85025 COMPLETE CBC W/AUTO DIFF WBC: CPT | Performed by: SURGERY

## 2021-01-01 PROCEDURE — 97112 NEUROMUSCULAR REEDUCATION: CPT

## 2021-01-01 PROCEDURE — 83880 ASSAY OF NATRIURETIC PEPTIDE: CPT | Performed by: PHYSICIAN ASSISTANT

## 2021-01-01 PROCEDURE — 80048 BASIC METABOLIC PNL TOTAL CA: CPT | Performed by: EMERGENCY MEDICINE

## 2021-01-01 PROCEDURE — 99233 SBSQ HOSP IP/OBS HIGH 50: CPT | Performed by: STUDENT IN AN ORGANIZED HEALTH CARE EDUCATION/TRAINING PROGRAM

## 2021-01-01 PROCEDURE — 99024 POSTOP FOLLOW-UP VISIT: CPT | Performed by: STUDENT IN AN ORGANIZED HEALTH CARE EDUCATION/TRAINING PROGRAM

## 2021-01-01 PROCEDURE — 80048 BASIC METABOLIC PNL TOTAL CA: CPT | Performed by: SURGERY

## 2021-01-01 PROCEDURE — 92526 ORAL FUNCTION THERAPY: CPT

## 2021-01-01 PROCEDURE — 76937 US GUIDE VASCULAR ACCESS: CPT

## 2021-01-01 PROCEDURE — 93010 ELECTROCARDIOGRAM REPORT: CPT | Performed by: INTERNAL MEDICINE

## 2021-01-01 PROCEDURE — 81001 URINALYSIS AUTO W/SCOPE: CPT | Performed by: EMERGENCY MEDICINE

## 2021-01-01 PROCEDURE — 85610 PROTHROMBIN TIME: CPT | Performed by: NURSE PRACTITIONER

## 2021-01-01 PROCEDURE — 82330 ASSAY OF CALCIUM: CPT | Performed by: PHYSICIAN ASSISTANT

## 2021-01-01 PROCEDURE — 75898 FOLLOW-UP ANGIOGRAPHY: CPT

## 2021-01-01 PROCEDURE — 85730 THROMBOPLASTIN TIME PARTIAL: CPT | Performed by: EMERGENCY MEDICINE

## 2021-01-01 PROCEDURE — 97535 SELF CARE MNGMENT TRAINING: CPT

## 2021-01-01 PROCEDURE — B308YZZ PLAIN RADIOGRAPHY OF BILATERAL INTERNAL CAROTID ARTERIES USING OTHER CONTRAST: ICD-10-PCS | Performed by: NEUROLOGICAL SURGERY

## 2021-01-01 PROCEDURE — NC001 PR NO CHARGE: Performed by: SURGERY

## 2021-01-01 PROCEDURE — C1894 INTRO/SHEATH, NON-LASER: HCPCS

## 2021-01-01 PROCEDURE — 99233 SBSQ HOSP IP/OBS HIGH 50: CPT | Performed by: EMERGENCY MEDICINE

## 2021-01-01 PROCEDURE — 75894 X-RAYS TRANSCATH THERAPY: CPT

## 2021-01-01 PROCEDURE — 0241U HB NFCT DS VIR RESP RNA 4 TRGT: CPT | Performed by: PHYSICIAN ASSISTANT

## 2021-01-01 PROCEDURE — 85027 COMPLETE CBC AUTOMATED: CPT | Performed by: PHYSICIAN ASSISTANT

## 2021-01-01 PROCEDURE — 99232 SBSQ HOSP IP/OBS MODERATE 35: CPT | Performed by: INTERNAL MEDICINE

## 2021-01-01 PROCEDURE — 70450 CT HEAD/BRAIN W/O DYE: CPT

## 2021-01-01 PROCEDURE — 36415 COLL VENOUS BLD VENIPUNCTURE: CPT | Performed by: EMERGENCY MEDICINE

## 2021-01-01 PROCEDURE — C1887 CATHETER, GUIDING: HCPCS

## 2021-01-01 PROCEDURE — 36224 PLACE CATH CAROTD ART: CPT | Performed by: NEUROLOGICAL SURGERY

## 2021-01-01 PROCEDURE — 97167 OT EVAL HIGH COMPLEX 60 MIN: CPT

## 2021-01-01 PROCEDURE — 71045 X-RAY EXAM CHEST 1 VIEW: CPT

## 2021-01-01 PROCEDURE — 85027 COMPLETE CBC AUTOMATED: CPT | Performed by: NURSE PRACTITIONER

## 2021-01-01 PROCEDURE — NC001 PR NO CHARGE: Performed by: NEUROLOGICAL SURGERY

## 2021-01-01 PROCEDURE — 97110 THERAPEUTIC EXERCISES: CPT

## 2021-01-01 PROCEDURE — 99291 CRITICAL CARE FIRST HOUR: CPT | Performed by: STUDENT IN AN ORGANIZED HEALTH CARE EDUCATION/TRAINING PROGRAM

## 2021-01-01 PROCEDURE — C9113 INJ PANTOPRAZOLE SODIUM, VIA: HCPCS | Performed by: PHYSICIAN ASSISTANT

## 2021-01-01 PROCEDURE — 81003 URINALYSIS AUTO W/O SCOPE: CPT | Performed by: EMERGENCY MEDICINE

## 2021-01-01 PROCEDURE — 85730 THROMBOPLASTIN TIME PARTIAL: CPT | Performed by: NURSE PRACTITIONER

## 2021-01-01 PROCEDURE — 99222 1ST HOSP IP/OBS MODERATE 55: CPT | Performed by: PHYSICIAN ASSISTANT

## 2021-01-01 PROCEDURE — 82607 VITAMIN B-12: CPT | Performed by: PHYSICIAN ASSISTANT

## 2021-01-01 PROCEDURE — 99232 SBSQ HOSP IP/OBS MODERATE 35: CPT | Performed by: STUDENT IN AN ORGANIZED HEALTH CARE EDUCATION/TRAINING PROGRAM

## 2021-01-01 PROCEDURE — 99223 1ST HOSP IP/OBS HIGH 75: CPT | Performed by: NURSE PRACTITIONER

## 2021-01-01 PROCEDURE — 99233 SBSQ HOSP IP/OBS HIGH 50: CPT | Performed by: SURGERY

## 2021-01-01 PROCEDURE — 99222 1ST HOSP IP/OBS MODERATE 55: CPT | Performed by: NURSE PRACTITIONER

## 2021-01-01 PROCEDURE — 84484 ASSAY OF TROPONIN QUANT: CPT | Performed by: PHYSICIAN ASSISTANT

## 2021-01-01 PROCEDURE — 85025 COMPLETE CBC W/AUTO DIFF WBC: CPT | Performed by: EMERGENCY MEDICINE

## 2021-01-01 PROCEDURE — 97163 PT EVAL HIGH COMPLEX 45 MIN: CPT

## 2021-01-01 PROCEDURE — 61626 TCAT PERM OCCLS/EMBOL NONCNS: CPT

## 2021-01-01 PROCEDURE — 76937 US GUIDE VASCULAR ACCESS: CPT | Performed by: NEUROLOGICAL SURGERY

## 2021-01-01 PROCEDURE — 75898 FOLLOW-UP ANGIOGRAPHY: CPT | Performed by: NEUROLOGICAL SURGERY

## 2021-01-01 PROCEDURE — B30CYZZ PLAIN RADIOGRAPHY OF BILATERAL EXTERNAL CAROTID ARTERIES USING OTHER CONTRAST: ICD-10-PCS | Performed by: NEUROLOGICAL SURGERY

## 2021-01-01 PROCEDURE — 99238 HOSP IP/OBS DSCHRG MGMT 30/<: CPT | Performed by: SURGERY

## 2021-01-01 PROCEDURE — 86850 RBC ANTIBODY SCREEN: CPT | Performed by: PHYSICIAN ASSISTANT

## 2021-01-01 PROCEDURE — 99024 POSTOP FOLLOW-UP VISIT: CPT | Performed by: NURSE PRACTITIONER

## 2021-01-01 PROCEDURE — 84443 ASSAY THYROID STIM HORMONE: CPT | Performed by: PHYSICIAN ASSISTANT

## 2021-01-01 PROCEDURE — 80053 COMPREHEN METABOLIC PANEL: CPT | Performed by: EMERGENCY MEDICINE

## 2021-01-01 PROCEDURE — 61154 BURR HOLE W/EVAC&/DRG HMTMA: CPT | Performed by: STUDENT IN AN ORGANIZED HEALTH CARE EDUCATION/TRAINING PROGRAM

## 2021-01-01 PROCEDURE — 75894 X-RAYS TRANSCATH THERAPY: CPT | Performed by: NEUROLOGICAL SURGERY

## 2021-01-01 PROCEDURE — 36227 PLACE CATH XTRNL CAROTID: CPT | Performed by: NEUROLOGICAL SURGERY

## 2021-01-01 PROCEDURE — 00C43ZZ EXTIRPATION OF MATTER FROM INTRACRANIAL SUBDURAL SPACE, PERCUTANEOUS APPROACH: ICD-10-PCS | Performed by: STUDENT IN AN ORGANIZED HEALTH CARE EDUCATION/TRAINING PROGRAM

## 2021-01-01 PROCEDURE — B30HYZZ PLAIN RADIOGRAPHY OF RIGHT UPPER EXTREMITY ARTERIES USING OTHER CONTRAST: ICD-10-PCS | Performed by: NEUROLOGICAL SURGERY

## 2021-01-01 PROCEDURE — 83735 ASSAY OF MAGNESIUM: CPT | Performed by: NURSE PRACTITIONER

## 2021-01-01 PROCEDURE — 99285 EMERGENCY DEPT VISIT HI MDM: CPT | Performed by: EMERGENCY MEDICINE

## 2021-01-01 PROCEDURE — 81001 URINALYSIS AUTO W/SCOPE: CPT | Performed by: SURGERY

## 2021-01-01 PROCEDURE — 99291 CRITICAL CARE FIRST HOUR: CPT | Performed by: SURGERY

## 2021-01-01 PROCEDURE — 61624 TCAT PERM OCCLS/EMBOLJ CNS: CPT | Performed by: NEUROLOGICAL SURGERY

## 2021-01-01 PROCEDURE — NC001 PR NO CHARGE: Performed by: STUDENT IN AN ORGANIZED HEALTH CARE EDUCATION/TRAINING PROGRAM

## 2021-01-01 PROCEDURE — 92610 EVALUATE SWALLOWING FUNCTION: CPT

## 2021-01-01 PROCEDURE — 86901 BLOOD TYPING SEROLOGIC RH(D): CPT | Performed by: PHYSICIAN ASSISTANT

## 2021-01-01 PROCEDURE — 36223 PLACE CATH CAROTID/INOM ART: CPT

## 2021-01-01 PROCEDURE — 99238 HOSP IP/OBS DSCHRG MGMT 30/<: CPT | Performed by: PHYSICIAN ASSISTANT

## 2021-01-01 PROCEDURE — 03LN3DZ OCCLUSION OF LEFT EXTERNAL CAROTID ARTERY WITH INTRALUMINAL DEVICE, PERCUTANEOUS APPROACH: ICD-10-PCS | Performed by: NEUROLOGICAL SURGERY

## 2021-01-01 PROCEDURE — B305YZZ PLAIN RADIOGRAPHY OF BILATERAL COMMON CAROTID ARTERIES USING OTHER CONTRAST: ICD-10-PCS | Performed by: NEUROLOGICAL SURGERY

## 2021-01-01 PROCEDURE — 92611 MOTION FLUOROSCOPY/SWALLOW: CPT

## 2021-01-01 PROCEDURE — 36228 PLACE CATH INTRACRANIAL ART: CPT | Performed by: NEUROLOGICAL SURGERY

## 2021-01-01 PROCEDURE — 86900 BLOOD TYPING SEROLOGIC ABO: CPT | Performed by: PHYSICIAN ASSISTANT

## 2021-01-01 PROCEDURE — 85027 COMPLETE CBC AUTOMATED: CPT | Performed by: EMERGENCY MEDICINE

## 2021-01-01 PROCEDURE — 82550 ASSAY OF CK (CPK): CPT | Performed by: PHYSICIAN ASSISTANT

## 2021-01-01 PROCEDURE — 97168 OT RE-EVAL EST PLAN CARE: CPT

## 2021-01-01 PROCEDURE — 36224 PLACE CATH CAROTD ART: CPT

## 2021-01-01 PROCEDURE — 99232 SBSQ HOSP IP/OBS MODERATE 35: CPT | Performed by: UROLOGY

## 2021-01-01 DEVICE — IMPLANTABLE DEVICE
Type: IMPLANTABLE DEVICE | Site: CRANIAL | Status: NON-FUNCTIONAL
Brand: 1.5MM SYSTEM
Removed: 2022-01-21

## 2021-01-01 DEVICE — IMPLANTABLE DEVICE
Type: IMPLANTABLE DEVICE | Site: CRANIAL | Status: NON-FUNCTIONAL
Brand: THINFLAP SYSTEM
Removed: 2022-01-21

## 2021-01-01 RX ORDER — HALOPERIDOL 5 MG/ML
5 INJECTION INTRAMUSCULAR ONCE
Status: COMPLETED | OUTPATIENT
Start: 2021-01-01 | End: 2021-01-01

## 2021-01-01 RX ORDER — VERAPAMIL HYDROCHLORIDE 2.5 MG/ML
INJECTION, SOLUTION INTRAVENOUS CODE/TRAUMA/SEDATION MEDICATION
Status: COMPLETED | OUTPATIENT
Start: 2021-01-01 | End: 2021-01-01

## 2021-01-01 RX ORDER — HEPARIN SODIUM 5000 [USP'U]/ML
5000 INJECTION, SOLUTION INTRAVENOUS; SUBCUTANEOUS EVERY 8 HOURS SCHEDULED
Status: COMPLETED | OUTPATIENT
Start: 2021-01-01 | End: 2021-01-01

## 2021-01-01 RX ORDER — ONDANSETRON 2 MG/ML
INJECTION INTRAMUSCULAR; INTRAVENOUS AS NEEDED
Status: DISCONTINUED | OUTPATIENT
Start: 2021-01-01 | End: 2021-01-01

## 2021-01-01 RX ORDER — OLANZAPINE 2.5 MG/1
2.5 TABLET ORAL ONCE
Status: COMPLETED | OUTPATIENT
Start: 2021-01-01 | End: 2021-01-01

## 2021-01-01 RX ORDER — FENTANYL CITRATE 50 UG/ML
INJECTION, SOLUTION INTRAMUSCULAR; INTRAVENOUS AS NEEDED
Status: DISCONTINUED | OUTPATIENT
Start: 2021-01-01 | End: 2021-01-01

## 2021-01-01 RX ORDER — LABETALOL 20 MG/4 ML (5 MG/ML) INTRAVENOUS SYRINGE
10 EVERY 4 HOURS PRN
Status: DISCONTINUED | OUTPATIENT
Start: 2021-01-01 | End: 2021-01-01 | Stop reason: HOSPADM

## 2021-01-01 RX ORDER — FINASTERIDE 5 MG/1
5 TABLET, FILM COATED ORAL DAILY
Status: DISCONTINUED | OUTPATIENT
Start: 2021-01-01 | End: 2021-01-01 | Stop reason: HOSPADM

## 2021-01-01 RX ORDER — QUETIAPINE FUMARATE 25 MG/1
25 TABLET, FILM COATED ORAL DAILY
Qty: 20 TABLET | Refills: 0 | Status: SHIPPED | OUTPATIENT
Start: 2021-01-01 | End: 2022-01-01

## 2021-01-01 RX ORDER — FINASTERIDE 5 MG/1
5 TABLET, FILM COATED ORAL DAILY
COMMUNITY

## 2021-01-01 RX ORDER — LIDOCAINE HYDROCHLORIDE AND EPINEPHRINE 10; 10 MG/ML; UG/ML
INJECTION, SOLUTION INFILTRATION; PERINEURAL AS NEEDED
Status: DISCONTINUED | OUTPATIENT
Start: 2021-01-01 | End: 2021-01-01 | Stop reason: HOSPADM

## 2021-01-01 RX ORDER — ACETAMINOPHEN 325 MG/1
975 TABLET ORAL EVERY 8 HOURS SCHEDULED
Refills: 0
Start: 2021-01-01 | End: 2022-01-01

## 2021-01-01 RX ORDER — ACETAMINOPHEN 325 MG/1
975 TABLET ORAL EVERY 8 HOURS SCHEDULED
Status: DISCONTINUED | OUTPATIENT
Start: 2021-01-01 | End: 2021-01-01 | Stop reason: HOSPADM

## 2021-01-01 RX ORDER — QUETIAPINE FUMARATE 25 MG/1
50 TABLET, FILM COATED ORAL
Status: DISCONTINUED | OUTPATIENT
Start: 2021-01-01 | End: 2021-01-01 | Stop reason: HOSPADM

## 2021-01-01 RX ORDER — POTASSIUM CHLORIDE 20MEQ/15ML
40 LIQUID (ML) ORAL ONCE
Status: COMPLETED | OUTPATIENT
Start: 2021-01-01 | End: 2021-01-01

## 2021-01-01 RX ORDER — DOCUSATE SODIUM 100 MG/1
100 CAPSULE, LIQUID FILLED ORAL DAILY
Status: DISCONTINUED | OUTPATIENT
Start: 2021-01-01 | End: 2021-01-01 | Stop reason: HOSPADM

## 2021-01-01 RX ORDER — METOPROLOL SUCCINATE 100 MG/1
100 TABLET, EXTENDED RELEASE ORAL DAILY
COMMUNITY
End: 2022-01-01

## 2021-01-01 RX ORDER — FENTANYL CITRATE/PF 50 MCG/ML
25 SYRINGE (ML) INJECTION
Status: CANCELLED | OUTPATIENT
Start: 2021-01-01

## 2021-01-01 RX ORDER — METHOCARBAMOL 500 MG/1
500 TABLET, FILM COATED ORAL EVERY 8 HOURS PRN
Status: DISCONTINUED | OUTPATIENT
Start: 2021-01-01 | End: 2021-01-01 | Stop reason: HOSPADM

## 2021-01-01 RX ORDER — POTASSIUM CHLORIDE 14.9 MG/ML
20 INJECTION INTRAVENOUS ONCE
Status: COMPLETED | OUTPATIENT
Start: 2021-01-01 | End: 2021-01-01

## 2021-01-01 RX ORDER — HEPARIN SODIUM 5000 [USP'U]/ML
5000 INJECTION, SOLUTION INTRAVENOUS; SUBCUTANEOUS EVERY 8 HOURS SCHEDULED
Status: DISCONTINUED | OUTPATIENT
Start: 2021-01-01 | End: 2021-01-01 | Stop reason: HOSPADM

## 2021-01-01 RX ORDER — PANTOPRAZOLE SODIUM 40 MG/1
40 TABLET, DELAYED RELEASE ORAL
Status: DISCONTINUED | OUTPATIENT
Start: 2021-01-01 | End: 2021-01-01

## 2021-01-01 RX ORDER — LIDOCAINE 50 MG/G
2 PATCH TOPICAL DAILY
Status: DISCONTINUED | OUTPATIENT
Start: 2021-01-01 | End: 2021-01-01 | Stop reason: HOSPADM

## 2021-01-01 RX ORDER — OXYCODONE HYDROCHLORIDE 5 MG/1
5 TABLET ORAL EVERY 4 HOURS PRN
Status: DISCONTINUED | OUTPATIENT
Start: 2021-01-01 | End: 2021-01-01

## 2021-01-01 RX ORDER — QUETIAPINE FUMARATE 25 MG/1
25 TABLET, FILM COATED ORAL
Status: DISCONTINUED | OUTPATIENT
Start: 2021-01-01 | End: 2021-01-01

## 2021-01-01 RX ORDER — LANOLIN ALCOHOL/MO/W.PET/CERES
3 CREAM (GRAM) TOPICAL
Status: DISCONTINUED | OUTPATIENT
Start: 2021-01-01 | End: 2021-01-01

## 2021-01-01 RX ORDER — HYDROMORPHONE HCL IN WATER/PF 6 MG/30 ML
0.2 PATIENT CONTROLLED ANALGESIA SYRINGE INTRAVENOUS EVERY 4 HOURS PRN
Status: DISCONTINUED | OUTPATIENT
Start: 2021-01-01 | End: 2021-01-01

## 2021-01-01 RX ORDER — PROPOFOL 10 MG/ML
INJECTION, EMULSION INTRAVENOUS AS NEEDED
Status: DISCONTINUED | OUTPATIENT
Start: 2021-01-01 | End: 2021-01-01

## 2021-01-01 RX ORDER — ECHINACEA PURPUREA EXTRACT 125 MG
2 TABLET ORAL EVERY 4 HOURS PRN
Status: DISCONTINUED | OUTPATIENT
Start: 2021-01-01 | End: 2021-01-01 | Stop reason: HOSPADM

## 2021-01-01 RX ORDER — POTASSIUM CHLORIDE 14.9 MG/ML
20 INJECTION INTRAVENOUS ONCE
Status: DISCONTINUED | OUTPATIENT
Start: 2021-01-01 | End: 2021-01-01

## 2021-01-01 RX ORDER — SODIUM CHLORIDE 9 MG/ML
125 INJECTION, SOLUTION INTRAVENOUS CONTINUOUS
Status: CANCELLED | OUTPATIENT
Start: 2021-01-01

## 2021-01-01 RX ORDER — LEVETIRACETAM 500 MG/1
500 TABLET ORAL 2 TIMES DAILY
Status: DISCONTINUED | OUTPATIENT
Start: 2021-01-01 | End: 2021-01-01

## 2021-01-01 RX ORDER — FENTANYL CITRATE/PF 50 MCG/ML
25 SYRINGE (ML) INJECTION
Status: DISCONTINUED | OUTPATIENT
Start: 2021-01-01 | End: 2021-01-01

## 2021-01-01 RX ORDER — SENNOSIDES 8.6 MG
2 TABLET ORAL 2 TIMES DAILY
Status: DISCONTINUED | OUTPATIENT
Start: 2021-01-01 | End: 2021-01-01 | Stop reason: HOSPADM

## 2021-01-01 RX ORDER — OLANZAPINE 10 MG/1
2.5 INJECTION, POWDER, LYOPHILIZED, FOR SOLUTION INTRAMUSCULAR ONCE
Status: COMPLETED | OUTPATIENT
Start: 2021-01-01 | End: 2021-01-01

## 2021-01-01 RX ORDER — DEXAMETHASONE SODIUM PHOSPHATE 10 MG/ML
INJECTION, SOLUTION INTRAMUSCULAR; INTRAVENOUS AS NEEDED
Status: DISCONTINUED | OUTPATIENT
Start: 2021-01-01 | End: 2021-01-01

## 2021-01-01 RX ORDER — ONDANSETRON 2 MG/ML
4 INJECTION INTRAMUSCULAR; INTRAVENOUS ONCE AS NEEDED
Status: CANCELLED | OUTPATIENT
Start: 2021-01-01

## 2021-01-01 RX ORDER — DIPHENOXYLATE HYDROCHLORIDE AND ATROPINE SULFATE 2.5; .025 MG/1; MG/1
1 TABLET ORAL DAILY
COMMUNITY

## 2021-01-01 RX ORDER — METOCLOPRAMIDE HYDROCHLORIDE 5 MG/ML
5 INJECTION INTRAMUSCULAR; INTRAVENOUS ONCE AS NEEDED
Status: DISCONTINUED | OUTPATIENT
Start: 2021-01-01 | End: 2021-01-01

## 2021-01-01 RX ORDER — OLANZAPINE 2.5 MG/1
2.5 TABLET ORAL ONCE
Status: DISCONTINUED | OUTPATIENT
Start: 2021-01-01 | End: 2021-01-01

## 2021-01-01 RX ORDER — ROCURONIUM BROMIDE 10 MG/ML
INJECTION, SOLUTION INTRAVENOUS AS NEEDED
Status: DISCONTINUED | OUTPATIENT
Start: 2021-01-01 | End: 2021-01-01

## 2021-01-01 RX ORDER — CEFAZOLIN SODIUM 2 G/50ML
SOLUTION INTRAVENOUS AS NEEDED
Status: DISCONTINUED | OUTPATIENT
Start: 2021-01-01 | End: 2021-01-01

## 2021-01-01 RX ORDER — POTASSIUM CHLORIDE 20 MEQ/1
20 TABLET, EXTENDED RELEASE ORAL ONCE
Status: DISCONTINUED | OUTPATIENT
Start: 2021-01-01 | End: 2021-01-01

## 2021-01-01 RX ORDER — TAMSULOSIN HYDROCHLORIDE 0.4 MG/1
0.4 CAPSULE ORAL 2 TIMES DAILY
Status: DISCONTINUED | OUTPATIENT
Start: 2021-01-01 | End: 2021-01-01 | Stop reason: HOSPADM

## 2021-01-01 RX ORDER — HEPARIN SODIUM 5000 [USP'U]/ML
5000 INJECTION, SOLUTION INTRAVENOUS; SUBCUTANEOUS EVERY 8 HOURS SCHEDULED
Status: DISCONTINUED | OUTPATIENT
Start: 2021-01-01 | End: 2021-01-01

## 2021-01-01 RX ORDER — POTASSIUM CHLORIDE 20 MEQ/1
40 TABLET, EXTENDED RELEASE ORAL ONCE
Status: COMPLETED | OUTPATIENT
Start: 2021-01-01 | End: 2021-01-01

## 2021-01-01 RX ORDER — LORAZEPAM 0.5 MG/1
0.5 TABLET ORAL ONCE
Status: COMPLETED | OUTPATIENT
Start: 2021-01-01 | End: 2021-01-01

## 2021-01-01 RX ORDER — PANTOPRAZOLE SODIUM 40 MG/1
40 TABLET, DELAYED RELEASE ORAL
Status: DISCONTINUED | OUTPATIENT
Start: 2021-01-01 | End: 2021-01-01 | Stop reason: HOSPADM

## 2021-01-01 RX ORDER — MAGNESIUM HYDROXIDE 1200 MG/15ML
LIQUID ORAL AS NEEDED
Status: DISCONTINUED | OUTPATIENT
Start: 2021-01-01 | End: 2021-01-01 | Stop reason: HOSPADM

## 2021-01-01 RX ORDER — OLANZAPINE 10 MG/1
INJECTION, POWDER, LYOPHILIZED, FOR SOLUTION INTRAMUSCULAR
Status: DISPENSED
Start: 2021-01-01 | End: 2021-01-01

## 2021-01-01 RX ORDER — DEXMEDETOMIDINE 100 UG/ML
.1-1.5 INJECTION, SOLUTION, CONCENTRATE INTRAVENOUS
Status: DISCONTINUED | OUTPATIENT
Start: 2021-01-01 | End: 2021-01-01

## 2021-01-01 RX ORDER — SODIUM CHLORIDE 9 MG/ML
125 INJECTION, SOLUTION INTRAVENOUS CONTINUOUS
Status: DISCONTINUED | OUTPATIENT
Start: 2021-01-01 | End: 2021-01-01

## 2021-01-01 RX ORDER — SODIUM CHLORIDE 9 MG/ML
INJECTION, SOLUTION INTRAVENOUS CONTINUOUS PRN
Status: DISCONTINUED | OUTPATIENT
Start: 2021-01-01 | End: 2021-01-01

## 2021-01-01 RX ORDER — HEPARIN SODIUM 1000 [USP'U]/ML
INJECTION, SOLUTION INTRAVENOUS; SUBCUTANEOUS CODE/TRAUMA/SEDATION MEDICATION
Status: COMPLETED | OUTPATIENT
Start: 2021-01-01 | End: 2021-01-01

## 2021-01-01 RX ORDER — OLANZAPINE 10 MG/1
2.5 INJECTION, POWDER, LYOPHILIZED, FOR SOLUTION INTRAMUSCULAR ONCE
Status: DISCONTINUED | OUTPATIENT
Start: 2021-01-01 | End: 2021-01-01

## 2021-01-01 RX ORDER — HALOPERIDOL 5 MG/ML
INJECTION INTRAMUSCULAR
Status: COMPLETED
Start: 2021-01-01 | End: 2021-01-01

## 2021-01-01 RX ORDER — LIDOCAINE HYDROCHLORIDE 10 MG/ML
INJECTION, SOLUTION EPIDURAL; INFILTRATION; INTRACAUDAL; PERINEURAL AS NEEDED
Status: DISCONTINUED | OUTPATIENT
Start: 2021-01-01 | End: 2021-01-01

## 2021-01-01 RX ORDER — SODIUM CHLORIDE 9 MG/ML
3 INJECTION INTRAVENOUS
Status: DISCONTINUED | OUTPATIENT
Start: 2021-01-01 | End: 2021-01-01 | Stop reason: HOSPADM

## 2021-01-01 RX ORDER — TAMSULOSIN HYDROCHLORIDE 0.4 MG/1
0.4 CAPSULE ORAL
COMMUNITY
End: 2022-01-01

## 2021-01-01 RX ORDER — PANTOPRAZOLE SODIUM 40 MG/1
40 TABLET, DELAYED RELEASE ORAL
Refills: 0
Start: 2021-01-01

## 2021-01-01 RX ORDER — PANTOPRAZOLE SODIUM 40 MG/1
40 INJECTION, POWDER, FOR SOLUTION INTRAVENOUS
Status: DISCONTINUED | OUTPATIENT
Start: 2021-01-01 | End: 2021-01-01

## 2021-01-01 RX ORDER — OXYCODONE HYDROCHLORIDE 5 MG/1
2.5 TABLET ORAL EVERY 4 HOURS PRN
Status: DISCONTINUED | OUTPATIENT
Start: 2021-01-01 | End: 2021-01-01

## 2021-01-01 RX ORDER — PRAVASTATIN SODIUM 10 MG
10 TABLET ORAL DAILY
COMMUNITY

## 2021-01-01 RX ORDER — SENNOSIDES 8.6 MG
2 TABLET ORAL 2 TIMES DAILY
Status: DISCONTINUED | OUTPATIENT
Start: 2021-01-01 | End: 2021-01-01

## 2021-01-01 RX ORDER — LANOLIN ALCOHOL/MO/W.PET/CERES
6 CREAM (GRAM) TOPICAL
Status: DISCONTINUED | OUTPATIENT
Start: 2021-01-01 | End: 2021-01-01 | Stop reason: HOSPADM

## 2021-01-01 RX ORDER — LORAZEPAM 2 MG/ML
0.5 INJECTION INTRAMUSCULAR ONCE
Status: DISCONTINUED | OUTPATIENT
Start: 2021-01-01 | End: 2021-01-01

## 2021-01-01 RX ORDER — RIBOFLAVIN (VITAMIN B2) 100 MG
100 TABLET ORAL DAILY
COMMUNITY
End: 2022-01-01

## 2021-01-01 RX ORDER — LIDOCAINE HYDROCHLORIDE 10 MG/ML
INJECTION, SOLUTION EPIDURAL; INFILTRATION; INTRACAUDAL; PERINEURAL CODE/TRAUMA/SEDATION MEDICATION
Status: COMPLETED | OUTPATIENT
Start: 2021-01-01 | End: 2021-01-01

## 2021-01-01 RX ORDER — SENNOSIDES 8.6 MG
2 TABLET ORAL DAILY
Status: DISCONTINUED | OUTPATIENT
Start: 2021-01-01 | End: 2021-01-01

## 2021-01-01 RX ORDER — METOPROLOL TARTRATE 5 MG/5ML
INJECTION INTRAVENOUS AS NEEDED
Status: DISCONTINUED | OUTPATIENT
Start: 2021-01-01 | End: 2021-01-01

## 2021-01-01 RX ORDER — ONDANSETRON 2 MG/ML
4 INJECTION INTRAMUSCULAR; INTRAVENOUS EVERY 6 HOURS PRN
Status: DISCONTINUED | OUTPATIENT
Start: 2021-01-01 | End: 2021-01-01 | Stop reason: HOSPADM

## 2021-01-01 RX ORDER — PANTOPRAZOLE SODIUM 40 MG/1
40 TABLET, DELAYED RELEASE ORAL DAILY
Status: DISCONTINUED | OUTPATIENT
Start: 2021-01-01 | End: 2021-01-01

## 2021-01-01 RX ORDER — DOCUSATE SODIUM 100 MG/1
100 CAPSULE, LIQUID FILLED ORAL 2 TIMES DAILY
Status: DISCONTINUED | OUTPATIENT
Start: 2021-01-01 | End: 2021-01-01

## 2021-01-01 RX ORDER — SODIUM CHLORIDE 9 MG/ML
75 INJECTION, SOLUTION INTRAVENOUS CONTINUOUS
Status: DISCONTINUED | OUTPATIENT
Start: 2021-01-01 | End: 2021-01-01

## 2021-01-01 RX ORDER — DOCUSATE SODIUM 100 MG/1
100 CAPSULE, LIQUID FILLED ORAL 2 TIMES DAILY PRN
Status: DISCONTINUED | OUTPATIENT
Start: 2021-01-01 | End: 2021-01-01

## 2021-01-01 RX ORDER — CEFAZOLIN SODIUM 2 G/50ML
2000 SOLUTION INTRAVENOUS EVERY 8 HOURS
Status: COMPLETED | OUTPATIENT
Start: 2021-01-01 | End: 2021-01-01

## 2021-01-01 RX ORDER — NITROGLYCERIN 20 MG/100ML
INJECTION INTRAVENOUS
Status: COMPLETED | OUTPATIENT
Start: 2021-01-01 | End: 2021-01-01

## 2021-01-01 RX ORDER — GLYCOPYRROLATE 0.2 MG/ML
INJECTION INTRAMUSCULAR; INTRAVENOUS AS NEEDED
Status: DISCONTINUED | OUTPATIENT
Start: 2021-01-01 | End: 2021-01-01

## 2021-01-01 RX ORDER — MAGNESIUM SULFATE HEPTAHYDRATE 40 MG/ML
2 INJECTION, SOLUTION INTRAVENOUS ONCE
Status: COMPLETED | OUTPATIENT
Start: 2021-01-01 | End: 2021-01-01

## 2021-01-01 RX ORDER — SENNOSIDES 8.6 MG
17.2 TABLET ORAL 2 TIMES DAILY
Refills: 0
Start: 2021-01-01

## 2021-01-01 RX ORDER — PRAVASTATIN SODIUM 10 MG
10 TABLET ORAL
Status: DISCONTINUED | OUTPATIENT
Start: 2021-01-01 | End: 2021-01-01 | Stop reason: HOSPADM

## 2021-01-01 RX ORDER — SENNOSIDES 8.6 MG
2 TABLET ORAL 2 TIMES DAILY PRN
Status: DISCONTINUED | OUTPATIENT
Start: 2021-01-01 | End: 2021-01-01

## 2021-01-01 RX ORDER — CITALOPRAM 10 MG/1
10 TABLET ORAL DAILY
Status: DISCONTINUED | OUTPATIENT
Start: 2021-01-01 | End: 2021-01-01 | Stop reason: HOSPADM

## 2021-01-01 RX ORDER — LANOLIN ALCOHOL/MO/W.PET/CERES
6 CREAM (GRAM) TOPICAL
Qty: 10 TABLET | Refills: 0 | Status: SHIPPED | OUTPATIENT
Start: 2021-01-01 | End: 2022-01-01

## 2021-01-01 RX ORDER — OLANZAPINE 10 MG/1
10 INJECTION, POWDER, LYOPHILIZED, FOR SOLUTION INTRAMUSCULAR ONCE
Status: COMPLETED | OUTPATIENT
Start: 2021-01-01 | End: 2021-01-01

## 2021-01-01 RX ORDER — ONDANSETRON 2 MG/ML
4 INJECTION INTRAMUSCULAR; INTRAVENOUS ONCE AS NEEDED
Status: DISCONTINUED | OUTPATIENT
Start: 2021-01-01 | End: 2021-01-01

## 2021-01-01 RX ORDER — QUETIAPINE FUMARATE 50 MG/1
50 TABLET, FILM COATED ORAL
Qty: 20 TABLET | Refills: 0 | Status: SHIPPED | OUTPATIENT
Start: 2021-01-01 | End: 2022-01-01

## 2021-01-01 RX ORDER — EPHEDRINE SULFATE 50 MG/ML
INJECTION INTRAVENOUS AS NEEDED
Status: DISCONTINUED | OUTPATIENT
Start: 2021-01-01 | End: 2021-01-01

## 2021-01-01 RX ORDER — LEVETIRACETAM 500 MG/1
1000 TABLET ORAL 2 TIMES DAILY
Status: DISCONTINUED | OUTPATIENT
Start: 2021-01-01 | End: 2021-01-01

## 2021-01-01 RX ORDER — LIDOCAINE HYDROCHLORIDE 20 MG/ML
1 JELLY TOPICAL ONCE
Status: COMPLETED | OUTPATIENT
Start: 2021-01-01 | End: 2021-01-01

## 2021-01-01 RX ORDER — SODIUM CHLORIDE, SODIUM GLUCONATE, SODIUM ACETATE, POTASSIUM CHLORIDE, MAGNESIUM CHLORIDE, SODIUM PHOSPHATE, DIBASIC, AND POTASSIUM PHOSPHATE .53; .5; .37; .037; .03; .012; .00082 G/100ML; G/100ML; G/100ML; G/100ML; G/100ML; G/100ML; G/100ML
100 INJECTION, SOLUTION INTRAVENOUS CONTINUOUS
Status: DISCONTINUED | OUTPATIENT
Start: 2021-01-01 | End: 2021-01-01

## 2021-01-01 RX ORDER — QUETIAPINE FUMARATE 25 MG/1
25 TABLET, FILM COATED ORAL DAILY
Status: DISCONTINUED | OUTPATIENT
Start: 2021-01-01 | End: 2021-01-01 | Stop reason: HOSPADM

## 2021-01-01 RX ORDER — LEVETIRACETAM 500 MG/1
500 TABLET ORAL EVERY 12 HOURS SCHEDULED
Status: COMPLETED | OUTPATIENT
Start: 2021-01-01 | End: 2021-01-01

## 2021-01-01 RX ORDER — DOCUSATE SODIUM 100 MG/1
100 CAPSULE, LIQUID FILLED ORAL DAILY
Refills: 0
Start: 2021-01-01 | End: 2022-01-01

## 2021-01-01 RX ORDER — CITALOPRAM 10 MG/1
10 TABLET ORAL DAILY
COMMUNITY
End: 2022-01-01

## 2021-01-01 RX ADMIN — FINASTERIDE 5 MG: 5 TABLET, FILM COATED ORAL at 09:57

## 2021-01-01 RX ADMIN — HEPARIN SODIUM 5000 UNITS: 5000 INJECTION INTRAVENOUS; SUBCUTANEOUS at 13:16

## 2021-01-01 RX ADMIN — SODIUM CHLORIDE 125 ML/HR: 0.9 INJECTION, SOLUTION INTRAVENOUS at 11:00

## 2021-01-01 RX ADMIN — MAGNESIUM SULFATE HEPTAHYDRATE 2 G: 40 INJECTION, SOLUTION INTRAVENOUS at 07:27

## 2021-01-01 RX ADMIN — ROCURONIUM BROMIDE 50 MG: 50 INJECTION, SOLUTION INTRAVENOUS at 08:11

## 2021-01-01 RX ADMIN — FINASTERIDE 5 MG: 5 TABLET, FILM COATED ORAL at 08:20

## 2021-01-01 RX ADMIN — CALCIUM GLUCONATE 3 G: 98 INJECTION, SOLUTION INTRAVENOUS at 08:17

## 2021-01-01 RX ADMIN — HEPARIN SODIUM 5000 UNITS: 5000 INJECTION INTRAVENOUS; SUBCUTANEOUS at 05:38

## 2021-01-01 RX ADMIN — STANDARDIZED SENNA CONCENTRATE 17.2 MG: 8.6 TABLET ORAL at 10:39

## 2021-01-01 RX ADMIN — MELATONIN 6 MG: at 21:34

## 2021-01-01 RX ADMIN — HEPARIN SODIUM 5000 UNITS: 5000 INJECTION INTRAVENOUS; SUBCUTANEOUS at 21:00

## 2021-01-01 RX ADMIN — Medication 3 MG: at 02:01

## 2021-01-01 RX ADMIN — PRAVASTATIN SODIUM 10 MG: 10 TABLET ORAL at 17:18

## 2021-01-01 RX ADMIN — MELATONIN 6 MG: at 21:35

## 2021-01-01 RX ADMIN — HEPARIN SODIUM 5000 UNITS: 5000 INJECTION INTRAVENOUS; SUBCUTANEOUS at 05:58

## 2021-01-01 RX ADMIN — ONDANSETRON 4 MG: 2 INJECTION INTRAMUSCULAR; INTRAVENOUS at 09:18

## 2021-01-01 RX ADMIN — FENTANYL CITRATE 50 MCG: 50 INJECTION INTRAMUSCULAR; INTRAVENOUS at 08:10

## 2021-01-01 RX ADMIN — FINASTERIDE 5 MG: 5 TABLET, FILM COATED ORAL at 09:15

## 2021-01-01 RX ADMIN — TAMSULOSIN HYDROCHLORIDE 0.4 MG: 0.4 CAPSULE ORAL at 08:55

## 2021-01-01 RX ADMIN — POTASSIUM CHLORIDE 40 MEQ: 1500 TABLET, EXTENDED RELEASE ORAL at 08:24

## 2021-01-01 RX ADMIN — MELATONIN 6 MG: at 21:47

## 2021-01-01 RX ADMIN — QUETIAPINE FUMARATE 50 MG: 25 TABLET, FILM COATED ORAL at 21:34

## 2021-01-01 RX ADMIN — METOPROLOL TARTRATE 25 MG: 25 TABLET, FILM COATED ORAL at 21:47

## 2021-01-01 RX ADMIN — LEVETIRACETAM 500 MG: 100 INJECTION, SOLUTION INTRAVENOUS at 21:00

## 2021-01-01 RX ADMIN — MELATONIN 6 MG: at 21:48

## 2021-01-01 RX ADMIN — ACETAMINOPHEN 975 MG: 325 TABLET, FILM COATED ORAL at 22:53

## 2021-01-01 RX ADMIN — ACETAMINOPHEN 975 MG: 325 TABLET, FILM COATED ORAL at 22:02

## 2021-01-01 RX ADMIN — HEPARIN SODIUM 5000 UNITS: 5000 INJECTION INTRAVENOUS; SUBCUTANEOUS at 06:11

## 2021-01-01 RX ADMIN — METOROPROLOL TARTRATE 2 MG: 5 INJECTION, SOLUTION INTRAVENOUS at 10:06

## 2021-01-01 RX ADMIN — LEVETIRACETAM 500 MG: 500 TABLET, FILM COATED ORAL at 21:34

## 2021-01-01 RX ADMIN — FINASTERIDE 5 MG: 5 TABLET, FILM COATED ORAL at 09:01

## 2021-01-01 RX ADMIN — QUETIAPINE FUMARATE 25 MG: 25 TABLET ORAL at 10:39

## 2021-01-01 RX ADMIN — QUETIAPINE FUMARATE 50 MG: 25 TABLET, FILM COATED ORAL at 21:06

## 2021-01-01 RX ADMIN — TAMSULOSIN HYDROCHLORIDE 0.4 MG: 0.4 CAPSULE ORAL at 08:41

## 2021-01-01 RX ADMIN — METOPROLOL TARTRATE 25 MG: 25 TABLET, FILM COATED ORAL at 08:20

## 2021-01-01 RX ADMIN — PRAVASTATIN SODIUM 10 MG: 10 TABLET ORAL at 17:11

## 2021-01-01 RX ADMIN — METOPROLOL TARTRATE 25 MG: 25 TABLET, FILM COATED ORAL at 08:24

## 2021-01-01 RX ADMIN — HEPARIN SODIUM 5000 UNITS: 5000 INJECTION INTRAVENOUS; SUBCUTANEOUS at 05:36

## 2021-01-01 RX ADMIN — HEPARIN SODIUM 5000 UNITS: 5000 INJECTION INTRAVENOUS; SUBCUTANEOUS at 21:35

## 2021-01-01 RX ADMIN — TAMSULOSIN HYDROCHLORIDE 0.4 MG: 0.4 CAPSULE ORAL at 17:22

## 2021-01-01 RX ADMIN — HEPARIN SODIUM 5000 UNITS: 5000 INJECTION INTRAVENOUS; SUBCUTANEOUS at 13:06

## 2021-01-01 RX ADMIN — SENNOSIDES 17.2 MG: 8.6 TABLET, FILM COATED ORAL at 08:26

## 2021-01-01 RX ADMIN — ACETAMINOPHEN 975 MG: 325 TABLET, FILM COATED ORAL at 22:12

## 2021-01-01 RX ADMIN — PANTOPRAZOLE SODIUM 40 MG: 20 TABLET, DELAYED RELEASE ORAL at 05:29

## 2021-01-01 RX ADMIN — PRAVASTATIN SODIUM 10 MG: 10 TABLET ORAL at 17:28

## 2021-01-01 RX ADMIN — METOPROLOL TARTRATE 25 MG: 25 TABLET, FILM COATED ORAL at 10:36

## 2021-01-01 RX ADMIN — VERAPAMIL HYDROCHLORIDE 5 MG: 2.5 INJECTION, SOLUTION INTRAVENOUS at 08:32

## 2021-01-01 RX ADMIN — LEVETIRACETAM 500 MG: 500 TABLET, FILM COATED ORAL at 09:15

## 2021-01-01 RX ADMIN — MELATONIN 6 MG: at 21:00

## 2021-01-01 RX ADMIN — SENNOSIDES 17.2 MG: 8.6 TABLET, FILM COATED ORAL at 10:36

## 2021-01-01 RX ADMIN — PANTOPRAZOLE SODIUM 40 MG: 40 TABLET, DELAYED RELEASE ORAL at 13:17

## 2021-01-01 RX ADMIN — LIDOCAINE HYDROCHLORIDE 1 APPLICATION: 20 JELLY TOPICAL at 11:46

## 2021-01-01 RX ADMIN — HALOPERIDOL LACTATE 5 MG: 5 INJECTION, SOLUTION INTRAMUSCULAR at 22:46

## 2021-01-01 RX ADMIN — STANDARDIZED SENNA CONCENTRATE 17.2 MG: 8.6 TABLET ORAL at 17:24

## 2021-01-01 RX ADMIN — HEPARIN SODIUM 5000 UNITS: 5000 INJECTION INTRAVENOUS; SUBCUTANEOUS at 13:27

## 2021-01-01 RX ADMIN — TAMSULOSIN HYDROCHLORIDE 0.4 MG: 0.4 CAPSULE ORAL at 09:14

## 2021-01-01 RX ADMIN — ACETAMINOPHEN 975 MG: 325 TABLET, FILM COATED ORAL at 06:11

## 2021-01-01 RX ADMIN — Medication 0.2 MCG/KG/HR: at 03:54

## 2021-01-01 RX ADMIN — METOPROLOL TARTRATE 25 MG: 25 TABLET, FILM COATED ORAL at 21:11

## 2021-01-01 RX ADMIN — ACETAMINOPHEN 975 MG: 325 TABLET, FILM COATED ORAL at 13:37

## 2021-01-01 RX ADMIN — HEPARIN SODIUM 5000 UNITS: 5000 INJECTION INTRAVENOUS; SUBCUTANEOUS at 21:04

## 2021-01-01 RX ADMIN — METOPROLOL TARTRATE 25 MG: 25 TABLET, FILM COATED ORAL at 21:25

## 2021-01-01 RX ADMIN — HEPARIN SODIUM 5000 UNITS: 5000 INJECTION INTRAVENOUS; SUBCUTANEOUS at 13:12

## 2021-01-01 RX ADMIN — METOPROLOL TARTRATE 25 MG: 25 TABLET, FILM COATED ORAL at 00:47

## 2021-01-01 RX ADMIN — CITALOPRAM HYDROBROMIDE 10 MG: 10 TABLET ORAL at 10:15

## 2021-01-01 RX ADMIN — METOPROLOL TARTRATE 25 MG: 25 TABLET, FILM COATED ORAL at 08:26

## 2021-01-01 RX ADMIN — CEFTRIAXONE SODIUM 1000 MG: 10 INJECTION, POWDER, FOR SOLUTION INTRAVENOUS at 10:48

## 2021-01-01 RX ADMIN — SUGAMMADEX 162 MG: 100 INJECTION, SOLUTION INTRAVENOUS at 09:54

## 2021-01-01 RX ADMIN — TAMSULOSIN HYDROCHLORIDE 0.4 MG: 0.4 CAPSULE ORAL at 10:10

## 2021-01-01 RX ADMIN — CITALOPRAM HYDROBROMIDE 10 MG: 10 TABLET ORAL at 09:22

## 2021-01-01 RX ADMIN — HEPARIN SODIUM 5000 UNITS: 5000 INJECTION INTRAVENOUS; SUBCUTANEOUS at 21:25

## 2021-01-01 RX ADMIN — METOPROLOL TARTRATE 25 MG: 25 TABLET, FILM COATED ORAL at 12:31

## 2021-01-01 RX ADMIN — STANDARDIZED SENNA CONCENTRATE 17.2 MG: 8.6 TABLET ORAL at 17:51

## 2021-01-01 RX ADMIN — CEFTRIAXONE SODIUM 1000 MG: 10 INJECTION, POWDER, FOR SOLUTION INTRAVENOUS at 11:11

## 2021-01-01 RX ADMIN — TAMSULOSIN HYDROCHLORIDE 0.4 MG: 0.4 CAPSULE ORAL at 18:28

## 2021-01-01 RX ADMIN — EPHEDRINE SULFATE 15 MG: 50 INJECTION, SOLUTION INTRAVENOUS at 08:33

## 2021-01-01 RX ADMIN — QUETIAPINE FUMARATE 25 MG: 25 TABLET ORAL at 08:55

## 2021-01-01 RX ADMIN — TAMSULOSIN HYDROCHLORIDE 0.4 MG: 0.4 CAPSULE ORAL at 18:38

## 2021-01-01 RX ADMIN — CEFAZOLIN SODIUM 2000 MG: 2 SOLUTION INTRAVENOUS at 01:59

## 2021-01-01 RX ADMIN — QUETIAPINE FUMARATE 25 MG: 25 TABLET ORAL at 10:30

## 2021-01-01 RX ADMIN — METOPROLOL TARTRATE 25 MG: 25 TABLET, FILM COATED ORAL at 08:55

## 2021-01-01 RX ADMIN — CITALOPRAM HYDROBROMIDE 10 MG: 10 TABLET ORAL at 08:21

## 2021-01-01 RX ADMIN — QUETIAPINE FUMARATE 25 MG: 25 TABLET ORAL at 09:23

## 2021-01-01 RX ADMIN — HEPARIN SODIUM 5000 UNITS: 5000 INJECTION INTRAVENOUS; SUBCUTANEOUS at 21:48

## 2021-01-01 RX ADMIN — METOPROLOL TARTRATE 25 MG: 25 TABLET, FILM COATED ORAL at 11:12

## 2021-01-01 RX ADMIN — HEPARIN SODIUM 5000 UNITS: 5000 INJECTION INTRAVENOUS; SUBCUTANEOUS at 21:07

## 2021-01-01 RX ADMIN — TAMSULOSIN HYDROCHLORIDE 0.4 MG: 0.4 CAPSULE ORAL at 10:15

## 2021-01-01 RX ADMIN — PROPOFOL 150 MG: 10 INJECTION, EMULSION INTRAVENOUS at 08:25

## 2021-01-01 RX ADMIN — PANTOPRAZOLE SODIUM 40 MG: 20 TABLET, DELAYED RELEASE ORAL at 06:16

## 2021-01-01 RX ADMIN — ACETAMINOPHEN 975 MG: 325 TABLET, FILM COATED ORAL at 17:50

## 2021-01-01 RX ADMIN — PANTOPRAZOLE SODIUM 40 MG: 20 TABLET, DELAYED RELEASE ORAL at 05:54

## 2021-01-01 RX ADMIN — Medication 1 MCG/KG/HR: at 08:33

## 2021-01-01 RX ADMIN — STANDARDIZED SENNA CONCENTRATE 17.2 MG: 8.6 TABLET ORAL at 17:09

## 2021-01-01 RX ADMIN — QUETIAPINE FUMARATE 50 MG: 25 TABLET, FILM COATED ORAL at 21:48

## 2021-01-01 RX ADMIN — HEPARIN SODIUM 5000 UNITS: 5000 INJECTION INTRAVENOUS; SUBCUTANEOUS at 21:29

## 2021-01-01 RX ADMIN — LIDOCAINE 5% 2 PATCH: 700 PATCH TOPICAL at 17:10

## 2021-01-01 RX ADMIN — PANTOPRAZOLE SODIUM 40 MG: 20 TABLET, DELAYED RELEASE ORAL at 05:58

## 2021-01-01 RX ADMIN — HEPARIN SODIUM 5000 UNITS: 5000 INJECTION INTRAVENOUS; SUBCUTANEOUS at 21:47

## 2021-01-01 RX ADMIN — MELATONIN 6 MG: at 00:32

## 2021-01-01 RX ADMIN — PRAVASTATIN SODIUM 10 MG: 10 TABLET ORAL at 17:15

## 2021-01-01 RX ADMIN — METOPROLOL TARTRATE 25 MG: 25 TABLET, FILM COATED ORAL at 10:39

## 2021-01-01 RX ADMIN — HEPARIN SODIUM 5000 UNITS: 5000 INJECTION INTRAVENOUS; SUBCUTANEOUS at 15:02

## 2021-01-01 RX ADMIN — PANTOPRAZOLE SODIUM 40 MG: 20 TABLET, DELAYED RELEASE ORAL at 05:48

## 2021-01-01 RX ADMIN — METOPROLOL TARTRATE 25 MG: 25 TABLET, FILM COATED ORAL at 20:50

## 2021-01-01 RX ADMIN — CITALOPRAM HYDROBROMIDE 10 MG: 10 TABLET ORAL at 10:57

## 2021-01-01 RX ADMIN — PANTOPRAZOLE SODIUM 40 MG: 20 TABLET, DELAYED RELEASE ORAL at 05:10

## 2021-01-01 RX ADMIN — TAMSULOSIN HYDROCHLORIDE 0.4 MG: 0.4 CAPSULE ORAL at 17:15

## 2021-01-01 RX ADMIN — ACETAMINOPHEN 975 MG: 325 TABLET, FILM COATED ORAL at 13:17

## 2021-01-01 RX ADMIN — MELATONIN 6 MG: at 21:25

## 2021-01-01 RX ADMIN — TAMSULOSIN HYDROCHLORIDE 0.4 MG: 0.4 CAPSULE ORAL at 10:39

## 2021-01-01 RX ADMIN — METOPROLOL TARTRATE 25 MG: 25 TABLET, FILM COATED ORAL at 21:50

## 2021-01-01 RX ADMIN — METOPROLOL TARTRATE 25 MG: 25 TABLET, FILM COATED ORAL at 10:55

## 2021-01-01 RX ADMIN — LIDOCAINE HYDROCHLORIDE 50 MG: 10 INJECTION, SOLUTION EPIDURAL; INFILTRATION; INTRACAUDAL; PERINEURAL at 08:25

## 2021-01-01 RX ADMIN — PRAVASTATIN SODIUM 10 MG: 10 TABLET ORAL at 18:28

## 2021-01-01 RX ADMIN — PANTOPRAZOLE SODIUM 40 MG: 20 TABLET, DELAYED RELEASE ORAL at 05:00

## 2021-01-01 RX ADMIN — ACETAMINOPHEN 975 MG: 325 TABLET, FILM COATED ORAL at 21:48

## 2021-01-01 RX ADMIN — ACETAMINOPHEN 975 MG: 325 TABLET, FILM COATED ORAL at 05:44

## 2021-01-01 RX ADMIN — Medication 0.8 MCG/KG/HR: at 04:18

## 2021-01-01 RX ADMIN — TAMSULOSIN HYDROCHLORIDE 0.4 MG: 0.4 CAPSULE ORAL at 16:46

## 2021-01-01 RX ADMIN — HEPARIN SODIUM 5000 UNITS: 5000 INJECTION INTRAVENOUS; SUBCUTANEOUS at 06:16

## 2021-01-01 RX ADMIN — PRAVASTATIN SODIUM 10 MG: 10 TABLET ORAL at 17:07

## 2021-01-01 RX ADMIN — MELATONIN 6 MG: at 22:53

## 2021-01-01 RX ADMIN — TAMSULOSIN HYDROCHLORIDE 0.4 MG: 0.4 CAPSULE ORAL at 17:17

## 2021-01-01 RX ADMIN — PRAVASTATIN SODIUM 10 MG: 10 TABLET ORAL at 16:06

## 2021-01-01 RX ADMIN — POTASSIUM PHOSPHATE, MONOBASIC POTASSIUM PHOSPHATE, DIBASIC 30 MMOL: 224; 236 INJECTION, SOLUTION, CONCENTRATE INTRAVENOUS at 10:12

## 2021-01-01 RX ADMIN — MELATONIN 6 MG: at 21:55

## 2021-01-01 RX ADMIN — HEPARIN SODIUM 5000 UNITS: 5000 INJECTION INTRAVENOUS; SUBCUTANEOUS at 06:03

## 2021-01-01 RX ADMIN — FINASTERIDE 5 MG: 5 TABLET, FILM COATED ORAL at 10:30

## 2021-01-01 RX ADMIN — IODIXANOL 150 ML: 320 INJECTION, SOLUTION INTRAVASCULAR at 09:30

## 2021-01-01 RX ADMIN — LEVETIRACETAM 1000 MG: 500 TABLET, FILM COATED ORAL at 17:26

## 2021-01-01 RX ADMIN — MELATONIN 6 MG: at 21:06

## 2021-01-01 RX ADMIN — METOPROLOL TARTRATE 25 MG: 25 TABLET, FILM COATED ORAL at 08:45

## 2021-01-01 RX ADMIN — LIDOCAINE 5% 2 PATCH: 700 PATCH TOPICAL at 17:51

## 2021-01-01 RX ADMIN — TAMSULOSIN HYDROCHLORIDE 0.4 MG: 0.4 CAPSULE ORAL at 11:12

## 2021-01-01 RX ADMIN — METOPROLOL TARTRATE 25 MG: 25 TABLET, FILM COATED ORAL at 08:41

## 2021-01-01 RX ADMIN — POTASSIUM CHLORIDE 20 MEQ: 14.9 INJECTION, SOLUTION INTRAVENOUS at 10:54

## 2021-01-01 RX ADMIN — TAMSULOSIN HYDROCHLORIDE 0.4 MG: 0.4 CAPSULE ORAL at 16:39

## 2021-01-01 RX ADMIN — METOPROLOL TARTRATE 25 MG: 25 TABLET, FILM COATED ORAL at 22:21

## 2021-01-01 RX ADMIN — CEFTRIAXONE SODIUM 1000 MG: 10 INJECTION, POWDER, FOR SOLUTION INTRAVENOUS at 10:41

## 2021-01-01 RX ADMIN — QUETIAPINE FUMARATE 25 MG: 25 TABLET ORAL at 10:10

## 2021-01-01 RX ADMIN — METOPROLOL TARTRATE 25 MG: 25 TABLET, FILM COATED ORAL at 21:00

## 2021-01-01 RX ADMIN — SODIUM CHLORIDE 125 ML/HR: 0.9 INJECTION, SOLUTION INTRAVENOUS at 11:48

## 2021-01-01 RX ADMIN — METHOCARBAMOL 500 MG: 500 TABLET, FILM COATED ORAL at 10:30

## 2021-01-01 RX ADMIN — FINASTERIDE 5 MG: 5 TABLET, FILM COATED ORAL at 10:10

## 2021-01-01 RX ADMIN — HEPARIN SODIUM 5000 UNITS: 5000 INJECTION INTRAVENOUS; SUBCUTANEOUS at 05:13

## 2021-01-01 RX ADMIN — DOCUSATE SODIUM 100 MG: 100 CAPSULE ORAL at 11:12

## 2021-01-01 RX ADMIN — TAMSULOSIN HYDROCHLORIDE 0.4 MG: 0.4 CAPSULE ORAL at 10:57

## 2021-01-01 RX ADMIN — PANTOPRAZOLE SODIUM 40 MG: 20 TABLET, DELAYED RELEASE ORAL at 05:13

## 2021-01-01 RX ADMIN — HEPARIN SODIUM 5000 UNITS: 5000 INJECTION INTRAVENOUS; SUBCUTANEOUS at 22:47

## 2021-01-01 RX ADMIN — QUETIAPINE FUMARATE 25 MG: 25 TABLET ORAL at 09:57

## 2021-01-01 RX ADMIN — LIDOCAINE 5% 2 PATCH: 700 PATCH TOPICAL at 18:38

## 2021-01-01 RX ADMIN — QUETIAPINE FUMARATE 25 MG: 25 TABLET ORAL at 10:57

## 2021-01-01 RX ADMIN — HEPARIN SODIUM 5000 UNITS: 5000 INJECTION INTRAVENOUS; SUBCUTANEOUS at 13:57

## 2021-01-01 RX ADMIN — ACETAMINOPHEN 975 MG: 325 TABLET, FILM COATED ORAL at 22:00

## 2021-01-01 RX ADMIN — METHOCARBAMOL 500 MG: 500 TABLET, FILM COATED ORAL at 21:24

## 2021-01-01 RX ADMIN — MELATONIN 6 MG: at 21:09

## 2021-01-01 RX ADMIN — CITALOPRAM HYDROBROMIDE 10 MG: 10 TABLET ORAL at 08:26

## 2021-01-01 RX ADMIN — FINASTERIDE 5 MG: 5 TABLET, FILM COATED ORAL at 10:39

## 2021-01-01 RX ADMIN — POTASSIUM & SODIUM PHOSPHATES POWDER PACK 280-160-250 MG 2 PACKET: 280-160-250 PACK at 08:15

## 2021-01-01 RX ADMIN — METOPROLOL TARTRATE 25 MG: 25 TABLET, FILM COATED ORAL at 10:40

## 2021-01-01 RX ADMIN — HEPARIN SODIUM 5000 UNITS: 5000 INJECTION INTRAVENOUS; SUBCUTANEOUS at 17:49

## 2021-01-01 RX ADMIN — HEPARIN SODIUM 5000 UNITS: 5000 INJECTION INTRAVENOUS; SUBCUTANEOUS at 14:14

## 2021-01-01 RX ADMIN — PANTOPRAZOLE SODIUM 40 MG: 20 TABLET, DELAYED RELEASE ORAL at 06:10

## 2021-01-01 RX ADMIN — METOPROLOL TARTRATE 25 MG: 25 TABLET, FILM COATED ORAL at 10:30

## 2021-01-01 RX ADMIN — MAGNESIUM SULFATE HEPTAHYDRATE 2 G: 40 INJECTION, SOLUTION INTRAVENOUS at 08:21

## 2021-01-01 RX ADMIN — QUETIAPINE FUMARATE 50 MG: 25 TABLET, FILM COATED ORAL at 21:47

## 2021-01-01 RX ADMIN — METOPROLOL TARTRATE 25 MG: 25 TABLET, FILM COATED ORAL at 09:57

## 2021-01-01 RX ADMIN — MELATONIN 6 MG: at 21:36

## 2021-01-01 RX ADMIN — ACETAMINOPHEN 975 MG: 325 TABLET, FILM COATED ORAL at 05:48

## 2021-01-01 RX ADMIN — QUETIAPINE FUMARATE 50 MG: 25 TABLET, FILM COATED ORAL at 20:50

## 2021-01-01 RX ADMIN — QUETIAPINE FUMARATE 25 MG: 25 TABLET ORAL at 08:45

## 2021-01-01 RX ADMIN — ACETAMINOPHEN 975 MG: 325 TABLET, FILM COATED ORAL at 17:22

## 2021-01-01 RX ADMIN — CITALOPRAM HYDROBROMIDE 10 MG: 10 TABLET ORAL at 11:21

## 2021-01-01 RX ADMIN — HEPARIN SODIUM 5000 UNITS: 5000 INJECTION INTRAVENOUS; SUBCUTANEOUS at 14:59

## 2021-01-01 RX ADMIN — DEXAMETHASONE SODIUM PHOSPHATE 10 MG: 10 INJECTION, SOLUTION INTRAMUSCULAR; INTRAVENOUS at 10:02

## 2021-01-01 RX ADMIN — TAMSULOSIN HYDROCHLORIDE 0.4 MG: 0.4 CAPSULE ORAL at 17:30

## 2021-01-01 RX ADMIN — METOPROLOL TARTRATE 25 MG: 25 TABLET, FILM COATED ORAL at 09:00

## 2021-01-01 RX ADMIN — DOCUSATE SODIUM 100 MG: 100 CAPSULE ORAL at 10:41

## 2021-01-01 RX ADMIN — METOPROLOL TARTRATE 25 MG: 25 TABLET, FILM COATED ORAL at 21:35

## 2021-01-01 RX ADMIN — CITALOPRAM HYDROBROMIDE 10 MG: 10 TABLET ORAL at 09:57

## 2021-01-01 RX ADMIN — LIDOCAINE 5% 2 PATCH: 700 PATCH TOPICAL at 17:40

## 2021-01-01 RX ADMIN — QUETIAPINE FUMARATE 25 MG: 25 TABLET ORAL at 22:00

## 2021-01-01 RX ADMIN — ACETAMINOPHEN 975 MG: 325 TABLET, FILM COATED ORAL at 21:00

## 2021-01-01 RX ADMIN — TAMSULOSIN HYDROCHLORIDE 0.4 MG: 0.4 CAPSULE ORAL at 09:23

## 2021-01-01 RX ADMIN — LEVETIRACETAM 500 MG: 500 TABLET, FILM COATED ORAL at 08:20

## 2021-01-01 RX ADMIN — FENTANYL CITRATE 50 MCG: 50 INJECTION INTRAMUSCULAR; INTRAVENOUS at 08:25

## 2021-01-01 RX ADMIN — METOPROLOL TARTRATE 25 MG: 25 TABLET, FILM COATED ORAL at 21:29

## 2021-01-01 RX ADMIN — TAMSULOSIN HYDROCHLORIDE 0.4 MG: 0.4 CAPSULE ORAL at 17:31

## 2021-01-01 RX ADMIN — MELATONIN 6 MG: at 21:04

## 2021-01-01 RX ADMIN — SENNOSIDES 17.2 MG: 8.6 TABLET, FILM COATED ORAL at 17:22

## 2021-01-01 RX ADMIN — LEVETIRACETAM 1000 MG: 100 INJECTION, SOLUTION INTRAVENOUS at 20:11

## 2021-01-01 RX ADMIN — HEPARIN SODIUM 5000 UNITS: 5000 INJECTION INTRAVENOUS; SUBCUTANEOUS at 21:34

## 2021-01-01 RX ADMIN — PANTOPRAZOLE SODIUM 40 MG: 20 TABLET, DELAYED RELEASE ORAL at 05:09

## 2021-01-01 RX ADMIN — OLANZAPINE 2.5 MG: 10 INJECTION, POWDER, FOR SOLUTION INTRAMUSCULAR at 04:24

## 2021-01-01 RX ADMIN — ONDANSETRON 4 MG: 2 INJECTION INTRAMUSCULAR; INTRAVENOUS at 10:02

## 2021-01-01 RX ADMIN — DOCUSATE SODIUM 100 MG: 100 CAPSULE ORAL at 10:36

## 2021-01-01 RX ADMIN — HEPARIN SODIUM 5000 UNITS: 5000 INJECTION INTRAVENOUS; SUBCUTANEOUS at 14:43

## 2021-01-01 RX ADMIN — MELATONIN 6 MG: at 22:00

## 2021-01-01 RX ADMIN — METHOCARBAMOL 500 MG: 500 TABLET, FILM COATED ORAL at 19:58

## 2021-01-01 RX ADMIN — METOPROLOL TARTRATE 25 MG: 25 TABLET, FILM COATED ORAL at 10:15

## 2021-01-01 RX ADMIN — ACETAMINOPHEN 975 MG: 325 TABLET, FILM COATED ORAL at 06:32

## 2021-01-01 RX ADMIN — ACETAMINOPHEN 975 MG: 325 TABLET, FILM COATED ORAL at 05:37

## 2021-01-01 RX ADMIN — HEPARIN SODIUM 5000 UNITS: 5000 INJECTION INTRAVENOUS; SUBCUTANEOUS at 21:55

## 2021-01-01 RX ADMIN — METOPROLOL TARTRATE 25 MG: 25 TABLET, FILM COATED ORAL at 11:21

## 2021-01-01 RX ADMIN — ACETAMINOPHEN 975 MG: 325 TABLET, FILM COATED ORAL at 21:25

## 2021-01-01 RX ADMIN — DOCUSATE SODIUM 100 MG: 100 CAPSULE ORAL at 10:30

## 2021-01-01 RX ADMIN — TAMSULOSIN HYDROCHLORIDE 0.4 MG: 0.4 CAPSULE ORAL at 17:07

## 2021-01-01 RX ADMIN — ACETAMINOPHEN 975 MG: 325 TABLET, FILM COATED ORAL at 05:58

## 2021-01-01 RX ADMIN — HEPARIN SODIUM 5000 UNITS: 5000 INJECTION INTRAVENOUS; SUBCUTANEOUS at 05:11

## 2021-01-01 RX ADMIN — HEPARIN SODIUM 5000 UNITS: 5000 INJECTION INTRAVENOUS; SUBCUTANEOUS at 21:09

## 2021-01-01 RX ADMIN — QUETIAPINE FUMARATE 50 MG: 25 TABLET, FILM COATED ORAL at 21:35

## 2021-01-01 RX ADMIN — HEPARIN SODIUM 5000 UNITS: 5000 INJECTION INTRAVENOUS; SUBCUTANEOUS at 14:24

## 2021-01-01 RX ADMIN — METOPROLOL TARTRATE 25 MG: 25 TABLET, FILM COATED ORAL at 21:55

## 2021-01-01 RX ADMIN — ACETAMINOPHEN 975 MG: 325 TABLET, FILM COATED ORAL at 15:01

## 2021-01-01 RX ADMIN — ACETAMINOPHEN 975 MG: 325 TABLET, FILM COATED ORAL at 10:10

## 2021-01-01 RX ADMIN — PROPOFOL 100 MG: 10 INJECTION, EMULSION INTRAVENOUS at 08:10

## 2021-01-01 RX ADMIN — ACETAMINOPHEN 975 MG: 325 TABLET, FILM COATED ORAL at 14:59

## 2021-01-01 RX ADMIN — LORAZEPAM 0.5 MG: 0.5 TABLET ORAL at 10:58

## 2021-01-01 RX ADMIN — OLANZAPINE 2.5 MG: 10 INJECTION, POWDER, FOR SOLUTION INTRAMUSCULAR at 06:05

## 2021-01-01 RX ADMIN — LIDOCAINE HYDROCHLORIDE 90 MG: 10 INJECTION, SOLUTION EPIDURAL; INFILTRATION; INTRACAUDAL; PERINEURAL at 08:10

## 2021-01-01 RX ADMIN — EPHEDRINE SULFATE 5 MG: 50 INJECTION, SOLUTION INTRAVENOUS at 08:36

## 2021-01-01 RX ADMIN — METHOCARBAMOL 500 MG: 500 TABLET, FILM COATED ORAL at 10:40

## 2021-01-01 RX ADMIN — DOCUSATE SODIUM 100 MG: 100 CAPSULE ORAL at 13:18

## 2021-01-01 RX ADMIN — ACETAMINOPHEN 975 MG: 325 TABLET, FILM COATED ORAL at 00:32

## 2021-01-01 RX ADMIN — POTASSIUM & SODIUM PHOSPHATES POWDER PACK 280-160-250 MG 2 PACKET: 280-160-250 PACK at 00:00

## 2021-01-01 RX ADMIN — HEPARIN SODIUM 5000 UNITS: 5000 INJECTION INTRAVENOUS; SUBCUTANEOUS at 21:24

## 2021-01-01 RX ADMIN — SODIUM CHLORIDE 125 ML/HR: 0.9 INJECTION, SOLUTION INTRAVENOUS at 19:04

## 2021-01-01 RX ADMIN — QUETIAPINE FUMARATE 50 MG: 25 TABLET, FILM COATED ORAL at 21:00

## 2021-01-01 RX ADMIN — FINASTERIDE 5 MG: 5 TABLET, FILM COATED ORAL at 11:12

## 2021-01-01 RX ADMIN — QUETIAPINE FUMARATE 50 MG: 25 TABLET, FILM COATED ORAL at 19:58

## 2021-01-01 RX ADMIN — LEVETIRACETAM 500 MG: 500 TABLET, FILM COATED ORAL at 21:25

## 2021-01-01 RX ADMIN — TAMSULOSIN HYDROCHLORIDE 0.4 MG: 0.4 CAPSULE ORAL at 08:34

## 2021-01-01 RX ADMIN — TAMSULOSIN HYDROCHLORIDE 0.4 MG: 0.4 CAPSULE ORAL at 08:46

## 2021-01-01 RX ADMIN — CEFAZOLIN SODIUM 2000 MG: 2 SOLUTION INTRAVENOUS at 16:32

## 2021-01-01 RX ADMIN — HEPARIN SODIUM 5000 UNITS: 5000 INJECTION INTRAVENOUS; SUBCUTANEOUS at 15:34

## 2021-01-01 RX ADMIN — CITALOPRAM HYDROBROMIDE 10 MG: 10 TABLET ORAL at 08:24

## 2021-01-01 RX ADMIN — ACETAMINOPHEN 975 MG: 325 TABLET, FILM COATED ORAL at 05:57

## 2021-01-01 RX ADMIN — MELATONIN 6 MG: at 21:29

## 2021-01-01 RX ADMIN — STANDARDIZED SENNA CONCENTRATE 17.2 MG: 8.6 TABLET ORAL at 10:40

## 2021-01-01 RX ADMIN — ACETAMINOPHEN 975 MG: 325 TABLET, FILM COATED ORAL at 21:35

## 2021-01-01 RX ADMIN — CITALOPRAM HYDROBROMIDE 10 MG: 10 TABLET ORAL at 10:40

## 2021-01-01 RX ADMIN — FINASTERIDE 5 MG: 5 TABLET, FILM COATED ORAL at 10:15

## 2021-01-01 RX ADMIN — POTASSIUM CHLORIDE 20 MEQ: 14.9 INJECTION, SOLUTION INTRAVENOUS at 08:22

## 2021-01-01 RX ADMIN — HEPARIN SODIUM 5000 UNITS: 5000 INJECTION INTRAVENOUS; SUBCUTANEOUS at 14:03

## 2021-01-01 RX ADMIN — SODIUM CHLORIDE 125 ML/HR: 0.9 INJECTION, SOLUTION INTRAVENOUS at 12:40

## 2021-01-01 RX ADMIN — SENNOSIDES 17.2 MG: 8.6 TABLET, FILM COATED ORAL at 11:12

## 2021-01-01 RX ADMIN — TAMSULOSIN HYDROCHLORIDE 0.4 MG: 0.4 CAPSULE ORAL at 10:32

## 2021-01-01 RX ADMIN — ACETAMINOPHEN 975 MG: 325 TABLET, FILM COATED ORAL at 23:53

## 2021-01-01 RX ADMIN — SENNOSIDES 17.2 MG: 8.6 TABLET, FILM COATED ORAL at 08:16

## 2021-01-01 RX ADMIN — TAMSULOSIN HYDROCHLORIDE 0.4 MG: 0.4 CAPSULE ORAL at 08:16

## 2021-01-01 RX ADMIN — TAMSULOSIN HYDROCHLORIDE 0.4 MG: 0.4 CAPSULE ORAL at 17:54

## 2021-01-01 RX ADMIN — DOCUSATE SODIUM 100 MG: 100 CAPSULE ORAL at 08:15

## 2021-01-01 RX ADMIN — METHOCARBAMOL 500 MG: 500 TABLET, FILM COATED ORAL at 14:43

## 2021-01-01 RX ADMIN — QUETIAPINE FUMARATE 50 MG: 25 TABLET, FILM COATED ORAL at 21:25

## 2021-01-01 RX ADMIN — MELATONIN 6 MG: at 21:07

## 2021-01-01 RX ADMIN — TAMSULOSIN HYDROCHLORIDE 0.4 MG: 0.4 CAPSULE ORAL at 17:10

## 2021-01-01 RX ADMIN — CITALOPRAM HYDROBROMIDE 10 MG: 10 TABLET ORAL at 08:41

## 2021-01-01 RX ADMIN — ACETAMINOPHEN 975 MG: 325 TABLET, FILM COATED ORAL at 02:20

## 2021-01-01 RX ADMIN — TAMSULOSIN HYDROCHLORIDE 0.4 MG: 0.4 CAPSULE ORAL at 09:00

## 2021-01-01 RX ADMIN — CITALOPRAM HYDROBROMIDE 10 MG: 10 TABLET ORAL at 08:55

## 2021-01-01 RX ADMIN — SODIUM CHLORIDE: 9 INJECTION, SOLUTION INTRAVENOUS at 08:26

## 2021-01-01 RX ADMIN — ACETAMINOPHEN 975 MG: 325 TABLET, FILM COATED ORAL at 13:07

## 2021-01-01 RX ADMIN — FINASTERIDE 5 MG: 5 TABLET, FILM COATED ORAL at 08:24

## 2021-01-01 RX ADMIN — LIDOCAINE 5% 2 PATCH: 700 PATCH TOPICAL at 17:18

## 2021-01-01 RX ADMIN — ACETAMINOPHEN 975 MG: 325 TABLET, FILM COATED ORAL at 21:28

## 2021-01-01 RX ADMIN — ACETAMINOPHEN 975 MG: 325 TABLET, FILM COATED ORAL at 05:11

## 2021-01-01 RX ADMIN — MELATONIN 6 MG: at 21:24

## 2021-01-01 RX ADMIN — HEPARIN SODIUM 5000 UNITS: 5000 INJECTION INTRAVENOUS; SUBCUTANEOUS at 14:06

## 2021-01-01 RX ADMIN — ACETAMINOPHEN 975 MG: 325 TABLET, FILM COATED ORAL at 11:22

## 2021-01-01 RX ADMIN — HEPARIN SODIUM 5000 UNITS: 5000 INJECTION INTRAVENOUS; SUBCUTANEOUS at 05:17

## 2021-01-01 RX ADMIN — CITALOPRAM HYDROBROMIDE 10 MG: 10 TABLET ORAL at 08:46

## 2021-01-01 RX ADMIN — CITALOPRAM HYDROBROMIDE 10 MG: 10 TABLET ORAL at 09:00

## 2021-01-01 RX ADMIN — HEPARIN SODIUM 5000 UNITS: 5000 INJECTION INTRAVENOUS; SUBCUTANEOUS at 06:32

## 2021-01-01 RX ADMIN — TAMSULOSIN HYDROCHLORIDE 0.4 MG: 0.4 CAPSULE ORAL at 08:24

## 2021-01-01 RX ADMIN — ACETAMINOPHEN 975 MG: 325 TABLET, FILM COATED ORAL at 13:55

## 2021-01-01 RX ADMIN — HEPARIN SODIUM 5000 UNITS: 5000 INJECTION INTRAVENOUS; SUBCUTANEOUS at 22:54

## 2021-01-01 RX ADMIN — TAMSULOSIN HYDROCHLORIDE 0.4 MG: 0.4 CAPSULE ORAL at 17:51

## 2021-01-01 RX ADMIN — FINASTERIDE 5 MG: 5 TABLET, FILM COATED ORAL at 13:40

## 2021-01-01 RX ADMIN — LEVETIRACETAM 1000 MG: 500 TABLET, FILM COATED ORAL at 19:29

## 2021-01-01 RX ADMIN — EPHEDRINE SULFATE 5 MG: 50 INJECTION, SOLUTION INTRAVENOUS at 08:34

## 2021-01-01 RX ADMIN — POTASSIUM CHLORIDE 20 MEQ: 14.9 INJECTION, SOLUTION INTRAVENOUS at 10:15

## 2021-01-01 RX ADMIN — DOCUSATE SODIUM 100 MG: 100 CAPSULE ORAL at 17:22

## 2021-01-01 RX ADMIN — QUETIAPINE FUMARATE 50 MG: 25 TABLET, FILM COATED ORAL at 21:29

## 2021-01-01 RX ADMIN — STANDARDIZED SENNA CONCENTRATE 17.2 MG: 8.6 TABLET ORAL at 10:29

## 2021-01-01 RX ADMIN — PRAVASTATIN SODIUM 10 MG: 10 TABLET ORAL at 17:48

## 2021-01-01 RX ADMIN — OLANZAPINE 2.5 MG: 2.5 TABLET, FILM COATED ORAL at 04:02

## 2021-01-01 RX ADMIN — ACETAMINOPHEN 975 MG: 325 TABLET, FILM COATED ORAL at 17:49

## 2021-01-01 RX ADMIN — PRAVASTATIN SODIUM 10 MG: 10 TABLET ORAL at 17:49

## 2021-01-01 RX ADMIN — METOPROLOL TARTRATE 25 MG: 25 TABLET, FILM COATED ORAL at 09:37

## 2021-01-01 RX ADMIN — HEPARIN SODIUM 5000 UNITS: 5000 INJECTION INTRAVENOUS; SUBCUTANEOUS at 05:44

## 2021-01-01 RX ADMIN — ACETAMINOPHEN 975 MG: 325 TABLET, FILM COATED ORAL at 05:09

## 2021-01-01 RX ADMIN — METOPROLOL TARTRATE 25 MG: 25 TABLET, FILM COATED ORAL at 21:07

## 2021-01-01 RX ADMIN — PANTOPRAZOLE SODIUM 40 MG: 20 TABLET, DELAYED RELEASE ORAL at 05:44

## 2021-01-01 RX ADMIN — ACETAMINOPHEN 975 MG: 325 TABLET, FILM COATED ORAL at 05:00

## 2021-01-01 RX ADMIN — TAMSULOSIN HYDROCHLORIDE 0.4 MG: 0.4 CAPSULE ORAL at 09:57

## 2021-01-01 RX ADMIN — PANTOPRAZOLE SODIUM 40 MG: 20 TABLET, DELAYED RELEASE ORAL at 05:11

## 2021-01-01 RX ADMIN — HEPARIN SODIUM 5000 UNITS: 5000 INJECTION INTRAVENOUS; SUBCUTANEOUS at 13:55

## 2021-01-01 RX ADMIN — TAMSULOSIN HYDROCHLORIDE 0.4 MG: 0.4 CAPSULE ORAL at 17:49

## 2021-01-01 RX ADMIN — ACETAMINOPHEN 975 MG: 325 TABLET, FILM COATED ORAL at 06:16

## 2021-01-01 RX ADMIN — OLANZAPINE 2.5 MG: 2.5 TABLET, FILM COATED ORAL at 08:26

## 2021-01-01 RX ADMIN — CITALOPRAM HYDROBROMIDE 10 MG: 10 TABLET ORAL at 10:11

## 2021-01-01 RX ADMIN — FINASTERIDE 5 MG: 5 TABLET, FILM COATED ORAL at 08:46

## 2021-01-01 RX ADMIN — PRAVASTATIN SODIUM 10 MG: 10 TABLET ORAL at 17:06

## 2021-01-01 RX ADMIN — POTASSIUM CHLORIDE 20 MEQ: 14.9 INJECTION, SOLUTION INTRAVENOUS at 07:00

## 2021-01-01 RX ADMIN — SODIUM CHLORIDE 125 ML/HR: 0.9 INJECTION, SOLUTION INTRAVENOUS at 10:43

## 2021-01-01 RX ADMIN — TAMSULOSIN HYDROCHLORIDE 0.4 MG: 0.4 CAPSULE ORAL at 10:36

## 2021-01-01 RX ADMIN — ACETAMINOPHEN 975 MG: 325 TABLET, FILM COATED ORAL at 13:24

## 2021-01-01 RX ADMIN — ACETAMINOPHEN 975 MG: 325 TABLET, FILM COATED ORAL at 09:57

## 2021-01-01 RX ADMIN — ACETAMINOPHEN 975 MG: 325 TABLET, FILM COATED ORAL at 21:08

## 2021-01-01 RX ADMIN — FINASTERIDE 5 MG: 5 TABLET, FILM COATED ORAL at 11:22

## 2021-01-01 RX ADMIN — METHOCARBAMOL 500 MG: 500 TABLET, FILM COATED ORAL at 11:03

## 2021-01-01 RX ADMIN — PANTOPRAZOLE SODIUM 40 MG: 20 TABLET, DELAYED RELEASE ORAL at 05:59

## 2021-01-01 RX ADMIN — PRAVASTATIN SODIUM 10 MG: 10 TABLET ORAL at 17:22

## 2021-01-01 RX ADMIN — ACETAMINOPHEN 975 MG: 325 TABLET, FILM COATED ORAL at 21:34

## 2021-01-01 RX ADMIN — METOPROLOL TARTRATE 25 MG: 25 TABLET, FILM COATED ORAL at 08:34

## 2021-01-01 RX ADMIN — CITALOPRAM HYDROBROMIDE 10 MG: 10 TABLET ORAL at 10:29

## 2021-01-01 RX ADMIN — OLANZAPINE 2.5 MG: 2.5 TABLET, FILM COATED ORAL at 13:38

## 2021-01-01 RX ADMIN — PRAVASTATIN SODIUM 10 MG: 10 TABLET ORAL at 17:51

## 2021-01-01 RX ADMIN — FINASTERIDE 5 MG: 5 TABLET, FILM COATED ORAL at 08:34

## 2021-01-01 RX ADMIN — STANDARDIZED SENNA CONCENTRATE 17.2 MG: 8.6 TABLET ORAL at 17:17

## 2021-01-01 RX ADMIN — PRAVASTATIN SODIUM 10 MG: 10 TABLET ORAL at 16:39

## 2021-01-01 RX ADMIN — HEPARIN SODIUM 5000 UNITS: 5000 INJECTION INTRAVENOUS; SUBCUTANEOUS at 05:10

## 2021-01-01 RX ADMIN — PRAVASTATIN SODIUM 10 MG: 10 TABLET ORAL at 18:38

## 2021-01-01 RX ADMIN — CITALOPRAM HYDROBROMIDE 10 MG: 10 TABLET ORAL at 09:16

## 2021-01-01 RX ADMIN — HEPARIN SODIUM 5000 UNITS: 5000 INJECTION INTRAVENOUS; SUBCUTANEOUS at 00:32

## 2021-01-01 RX ADMIN — PRAVASTATIN SODIUM 10 MG: 10 TABLET ORAL at 17:10

## 2021-01-01 RX ADMIN — TAMSULOSIN HYDROCHLORIDE 0.4 MG: 0.4 CAPSULE ORAL at 17:48

## 2021-01-01 RX ADMIN — PRAVASTATIN SODIUM 10 MG: 10 TABLET ORAL at 16:32

## 2021-01-01 RX ADMIN — TAMSULOSIN HYDROCHLORIDE 0.4 MG: 0.4 CAPSULE ORAL at 17:11

## 2021-01-01 RX ADMIN — HEPARIN SODIUM 5000 UNITS: 5000 INJECTION INTRAVENOUS; SUBCUTANEOUS at 05:54

## 2021-01-01 RX ADMIN — LIDOCAINE 5% 2 PATCH: 700 PATCH TOPICAL at 17:22

## 2021-01-01 RX ADMIN — METHOCARBAMOL 500 MG: 500 TABLET, FILM COATED ORAL at 23:53

## 2021-01-01 RX ADMIN — CITALOPRAM HYDROBROMIDE 10 MG: 10 TABLET ORAL at 08:35

## 2021-01-01 RX ADMIN — TAMSULOSIN HYDROCHLORIDE 0.4 MG: 0.4 CAPSULE ORAL at 18:07

## 2021-01-01 RX ADMIN — PANTOPRAZOLE SODIUM 40 MG: 40 INJECTION, POWDER, FOR SOLUTION INTRAVENOUS at 11:53

## 2021-01-01 RX ADMIN — HEPARIN SODIUM 5000 UNITS: 5000 INJECTION INTRAVENOUS; SUBCUTANEOUS at 22:01

## 2021-01-01 RX ADMIN — HALOPERIDOL 5 MG: 5 INJECTION INTRAMUSCULAR at 22:46

## 2021-01-01 RX ADMIN — CITALOPRAM HYDROBROMIDE 10 MG: 10 TABLET ORAL at 11:12

## 2021-01-01 RX ADMIN — HEPARIN SODIUM 5000 UNITS: 5000 INJECTION INTRAVENOUS; SUBCUTANEOUS at 05:00

## 2021-01-01 RX ADMIN — EPHEDRINE SULFATE 5 MG: 50 INJECTION, SOLUTION INTRAVENOUS at 09:30

## 2021-01-01 RX ADMIN — OLANZAPINE 10 MG: 10 INJECTION, POWDER, LYOPHILIZED, FOR SOLUTION INTRAMUSCULAR at 13:58

## 2021-01-01 RX ADMIN — HEPARIN SODIUM 5000 UNITS: 5000 INJECTION INTRAVENOUS; SUBCUTANEOUS at 05:15

## 2021-01-01 RX ADMIN — ACETAMINOPHEN 975 MG: 325 TABLET, FILM COATED ORAL at 05:13

## 2021-01-01 RX ADMIN — ACETAMINOPHEN 975 MG: 325 TABLET, FILM COATED ORAL at 17:09

## 2021-01-01 RX ADMIN — POTASSIUM CHLORIDE 40 MEQ: 20 SOLUTION ORAL at 10:10

## 2021-01-01 RX ADMIN — FINASTERIDE 5 MG: 5 TABLET, FILM COATED ORAL at 08:41

## 2021-01-01 RX ADMIN — TAMSULOSIN HYDROCHLORIDE 0.4 MG: 0.4 CAPSULE ORAL at 11:22

## 2021-01-01 RX ADMIN — HEPARIN SODIUM 5000 UNITS: 5000 INJECTION INTRAVENOUS; SUBCUTANEOUS at 22:25

## 2021-01-01 RX ADMIN — HEPARIN SODIUM 3000 UNITS: 1000 INJECTION INTRAVENOUS; SUBCUTANEOUS at 08:32

## 2021-01-01 RX ADMIN — PANTOPRAZOLE SODIUM 40 MG: 20 TABLET, DELAYED RELEASE ORAL at 05:17

## 2021-01-01 RX ADMIN — WATER 10 ML: 1 INJECTION INTRAMUSCULAR; INTRAVENOUS; SUBCUTANEOUS at 06:00

## 2021-01-01 RX ADMIN — FENTANYL CITRATE 50 MCG: 50 INJECTION INTRAMUSCULAR; INTRAVENOUS at 08:47

## 2021-01-01 RX ADMIN — TAMSULOSIN HYDROCHLORIDE 0.4 MG: 0.4 CAPSULE ORAL at 08:26

## 2021-01-01 RX ADMIN — QUETIAPINE FUMARATE 25 MG: 25 TABLET ORAL at 21:26

## 2021-01-01 RX ADMIN — HEPARIN SODIUM 5000 UNITS: 5000 INJECTION INTRAVENOUS; SUBCUTANEOUS at 13:36

## 2021-01-01 RX ADMIN — FINASTERIDE 5 MG: 5 TABLET, FILM COATED ORAL at 10:57

## 2021-01-01 RX ADMIN — HALOPERIDOL 5 MG: 5 INJECTION INTRAMUSCULAR at 08:34

## 2021-01-01 RX ADMIN — HEPARIN SODIUM 5000 UNITS: 5000 INJECTION INTRAVENOUS; SUBCUTANEOUS at 21:37

## 2021-01-01 RX ADMIN — SENNOSIDES 17.2 MG: 8.6 TABLET, FILM COATED ORAL at 17:07

## 2021-01-01 RX ADMIN — FINASTERIDE 5 MG: 5 TABLET, FILM COATED ORAL at 08:15

## 2021-01-01 RX ADMIN — ACETAMINOPHEN 975 MG: 325 TABLET, FILM COATED ORAL at 13:12

## 2021-01-01 RX ADMIN — QUETIAPINE FUMARATE 25 MG: 25 TABLET ORAL at 09:03

## 2021-01-01 RX ADMIN — GLYCOPYRROLATE 0.1 MG: 0.2 INJECTION, SOLUTION INTRAMUSCULAR; INTRAVENOUS at 08:51

## 2021-01-01 RX ADMIN — DOCUSATE SODIUM 100 MG: 100 CAPSULE ORAL at 17:48

## 2021-01-01 RX ADMIN — HYDROMORPHONE HYDROCHLORIDE 0.2 MG: 0.2 INJECTION, SOLUTION INTRAMUSCULAR; INTRAVENOUS; SUBCUTANEOUS at 01:56

## 2021-01-01 RX ADMIN — ACETAMINOPHEN 975 MG: 325 TABLET, FILM COATED ORAL at 14:23

## 2021-01-01 RX ADMIN — SUGAMMADEX 200 MG: 100 INJECTION, SOLUTION INTRAVENOUS at 09:19

## 2021-01-01 RX ADMIN — PHENYLEPHRINE HYDROCHLORIDE 60 MCG/MIN: 10 INJECTION INTRAVENOUS at 08:56

## 2021-01-01 RX ADMIN — HEPARIN SODIUM 5000 UNITS: 5000 INJECTION INTRAVENOUS; SUBCUTANEOUS at 05:29

## 2021-01-01 RX ADMIN — ACETAMINOPHEN 975 MG: 325 TABLET, FILM COATED ORAL at 10:29

## 2021-01-01 RX ADMIN — LIDOCAINE 5% 2 PATCH: 700 PATCH TOPICAL at 16:38

## 2021-01-01 RX ADMIN — EPHEDRINE SULFATE 10 MG: 50 INJECTION, SOLUTION INTRAVENOUS at 08:38

## 2021-01-01 RX ADMIN — WATER 10 ML: 1 INJECTION INTRAMUSCULAR; INTRAVENOUS; SUBCUTANEOUS at 04:29

## 2021-01-01 RX ADMIN — TAMSULOSIN HYDROCHLORIDE 0.4 MG: 0.4 CAPSULE ORAL at 17:28

## 2021-01-01 RX ADMIN — QUETIAPINE FUMARATE 50 MG: 25 TABLET, FILM COATED ORAL at 22:02

## 2021-01-01 RX ADMIN — CITALOPRAM HYDROBROMIDE 10 MG: 10 TABLET ORAL at 08:16

## 2021-01-01 RX ADMIN — METOPROLOL TARTRATE 25 MG: 25 TABLET, FILM COATED ORAL at 09:14

## 2021-01-01 RX ADMIN — ACETAMINOPHEN 975 MG: 325 TABLET, FILM COATED ORAL at 17:17

## 2021-01-01 RX ADMIN — TAMSULOSIN HYDROCHLORIDE 0.4 MG: 0.4 CAPSULE ORAL at 17:06

## 2021-01-01 RX ADMIN — METOPROLOL TARTRATE 25 MG: 25 TABLET, FILM COATED ORAL at 10:10

## 2021-01-01 RX ADMIN — QUETIAPINE FUMARATE 25 MG: 25 TABLET ORAL at 08:24

## 2021-01-01 RX ADMIN — ACETAMINOPHEN 975 MG: 325 TABLET, FILM COATED ORAL at 10:40

## 2021-01-01 RX ADMIN — QUETIAPINE FUMARATE 25 MG: 25 TABLET ORAL at 11:21

## 2021-01-01 RX ADMIN — CITALOPRAM HYDROBROMIDE 10 MG: 10 TABLET ORAL at 10:39

## 2021-01-01 RX ADMIN — ACETAMINOPHEN 975 MG: 325 TABLET, FILM COATED ORAL at 13:16

## 2021-01-01 RX ADMIN — ACETAMINOPHEN 975 MG: 325 TABLET, FILM COATED ORAL at 13:40

## 2021-01-01 RX ADMIN — TAMSULOSIN HYDROCHLORIDE 0.4 MG: 0.4 CAPSULE ORAL at 08:21

## 2021-01-01 RX ADMIN — METOPROLOL TARTRATE 25 MG: 25 TABLET, FILM COATED ORAL at 21:48

## 2021-01-01 RX ADMIN — ACETAMINOPHEN 975 MG: 325 TABLET, FILM COATED ORAL at 21:06

## 2021-01-01 RX ADMIN — TAMSULOSIN HYDROCHLORIDE 0.4 MG: 0.4 CAPSULE ORAL at 17:40

## 2021-01-01 RX ADMIN — QUETIAPINE FUMARATE 50 MG: 25 TABLET, FILM COATED ORAL at 21:24

## 2021-01-01 RX ADMIN — CEFAZOLIN SODIUM 2000 MG: 2 SOLUTION INTRAVENOUS at 08:47

## 2021-01-01 RX ADMIN — METOPROLOL TARTRATE 25 MG: 25 TABLET, FILM COATED ORAL at 21:39

## 2021-01-01 RX ADMIN — HEPARIN SODIUM 5000 UNITS: 5000 INJECTION INTRAVENOUS; SUBCUTANEOUS at 05:09

## 2021-01-01 RX ADMIN — HEPARIN SODIUM 5000 UNITS: 5000 INJECTION INTRAVENOUS; SUBCUTANEOUS at 13:11

## 2021-01-01 RX ADMIN — ACETAMINOPHEN 975 MG: 325 TABLET, FILM COATED ORAL at 05:10

## 2021-01-01 RX ADMIN — ACETAMINOPHEN 975 MG: 325 TABLET, FILM COATED ORAL at 10:39

## 2021-01-01 RX ADMIN — LEVETIRACETAM 1000 MG: 100 INJECTION, SOLUTION INTRAVENOUS at 10:07

## 2021-01-01 RX ADMIN — EPHEDRINE SULFATE 5 MG: 50 INJECTION, SOLUTION INTRAVENOUS at 09:27

## 2021-01-01 RX ADMIN — DEXAMETHASONE SODIUM PHOSPHATE 10 MG: 10 INJECTION, SOLUTION INTRAMUSCULAR; INTRAVENOUS at 08:41

## 2021-01-01 RX ADMIN — EPHEDRINE SULFATE 10 MG: 50 INJECTION, SOLUTION INTRAVENOUS at 08:43

## 2021-01-01 RX ADMIN — LEVETIRACETAM 1000 MG: 100 INJECTION, SOLUTION INTRAVENOUS at 21:05

## 2021-01-01 RX ADMIN — SODIUM CHLORIDE, SODIUM GLUCONATE, SODIUM ACETATE, POTASSIUM CHLORIDE, MAGNESIUM CHLORIDE, SODIUM PHOSPHATE, DIBASIC, AND POTASSIUM PHOSPHATE 100 ML/HR: .53; .5; .37; .037; .03; .012; .00082 INJECTION, SOLUTION INTRAVENOUS at 18:10

## 2021-01-01 RX ADMIN — PANTOPRAZOLE SODIUM 40 MG: 40 TABLET, DELAYED RELEASE ORAL at 08:20

## 2021-01-01 RX ADMIN — LEVETIRACETAM 1000 MG: 100 INJECTION, SOLUTION INTRAVENOUS at 13:25

## 2021-01-01 RX ADMIN — PRAVASTATIN SODIUM 10 MG: 10 TABLET ORAL at 16:46

## 2021-01-01 RX ADMIN — SODIUM CHLORIDE: 9 INJECTION, SOLUTION INTRAVENOUS at 08:03

## 2021-01-01 RX ADMIN — HEPARIN SODIUM 5000 UNITS: 5000 INJECTION INTRAVENOUS; SUBCUTANEOUS at 05:48

## 2021-01-01 RX ADMIN — FINASTERIDE 5 MG: 5 TABLET, FILM COATED ORAL at 08:26

## 2021-01-01 RX ADMIN — CITALOPRAM HYDROBROMIDE 10 MG: 10 TABLET ORAL at 10:36

## 2021-01-01 RX ADMIN — PRAVASTATIN SODIUM 10 MG: 10 TABLET ORAL at 17:30

## 2021-01-01 RX ADMIN — QUETIAPINE FUMARATE 25 MG: 25 TABLET ORAL at 10:40

## 2021-01-01 RX ADMIN — FINASTERIDE 5 MG: 5 TABLET, FILM COATED ORAL at 09:23

## 2021-01-01 RX ADMIN — SENNOSIDES 17.2 MG: 8.6 TABLET, FILM COATED ORAL at 17:48

## 2021-01-01 RX ADMIN — ACETAMINOPHEN 975 MG: 325 TABLET, FILM COATED ORAL at 13:57

## 2021-01-01 RX ADMIN — DOCUSATE SODIUM 100 MG: 100 CAPSULE ORAL at 17:26

## 2021-01-01 RX ADMIN — METOPROLOL TARTRATE 25 MG: 25 TABLET, FILM COATED ORAL at 23:21

## 2021-01-01 RX ADMIN — HEPARIN SODIUM 5000 UNITS: 5000 INJECTION INTRAVENOUS; SUBCUTANEOUS at 13:24

## 2021-01-01 RX ADMIN — ACETAMINOPHEN 975 MG: 325 TABLET, FILM COATED ORAL at 22:25

## 2021-01-01 RX ADMIN — HALOPERIDOL LACTATE 5 MG: 5 INJECTION, SOLUTION INTRAMUSCULAR at 03:24

## 2021-01-01 RX ADMIN — LIDOCAINE HYDROCHLORIDE 1 ML: 10 INJECTION, SOLUTION EPIDURAL; INFILTRATION; INTRACAUDAL; PERINEURAL at 08:32

## 2021-01-01 RX ADMIN — FINASTERIDE 5 MG: 5 TABLET, FILM COATED ORAL at 10:36

## 2021-01-01 RX ADMIN — QUETIAPINE FUMARATE 50 MG: 25 TABLET, FILM COATED ORAL at 21:09

## 2021-01-01 RX ADMIN — CITALOPRAM HYDROBROMIDE 10 MG: 10 TABLET ORAL at 13:40

## 2021-01-01 RX ADMIN — QUETIAPINE FUMARATE 50 MG: 25 TABLET, FILM COATED ORAL at 22:53

## 2021-01-01 RX ADMIN — PANTOPRAZOLE SODIUM 40 MG: 20 TABLET, DELAYED RELEASE ORAL at 06:32

## 2021-01-01 RX ADMIN — HEPARIN SODIUM 5000 UNITS: 5000 INJECTION INTRAVENOUS; SUBCUTANEOUS at 13:40

## 2021-01-01 RX ADMIN — QUETIAPINE FUMARATE 50 MG: 25 TABLET, FILM COATED ORAL at 00:32

## 2021-01-01 RX ADMIN — DOCUSATE SODIUM 100 MG: 100 CAPSULE ORAL at 17:55

## 2021-01-01 RX ADMIN — ACETAMINOPHEN 975 MG: 325 TABLET, FILM COATED ORAL at 14:11

## 2021-01-01 RX ADMIN — MELATONIN 6 MG: at 22:02

## 2021-01-01 RX ADMIN — QUETIAPINE FUMARATE 50 MG: 25 TABLET, FILM COATED ORAL at 21:55

## 2021-01-01 RX ADMIN — PRAVASTATIN SODIUM 10 MG: 10 TABLET ORAL at 17:54

## 2021-01-01 RX ADMIN — PRAVASTATIN SODIUM 10 MG: 10 TABLET ORAL at 17:40

## 2021-01-01 RX ADMIN — FINASTERIDE 5 MG: 5 TABLET, FILM COATED ORAL at 08:55

## 2021-01-01 RX ADMIN — DOCUSATE SODIUM 100 MG: 100 CAPSULE ORAL at 08:26

## 2021-01-01 RX ADMIN — ACETAMINOPHEN 975 MG: 325 TABLET, FILM COATED ORAL at 18:38

## 2021-01-01 RX ADMIN — HALOPERIDOL LACTATE 5 MG: 5 INJECTION, SOLUTION INTRAMUSCULAR at 08:34

## 2021-01-01 RX ADMIN — NITROGLYCERIN 400 MCG: 20 INJECTION INTRAVENOUS at 08:32

## 2021-01-01 RX ADMIN — HEPARIN SODIUM 5000 UNITS: 5000 INJECTION INTRAVENOUS; SUBCUTANEOUS at 14:11

## 2021-11-26 PROBLEM — S06.5X9A SUBDURAL HEMATOMA (HCC): Status: ACTIVE | Noted: 2021-01-01

## 2021-11-26 PROBLEM — I10 ESSENTIAL HYPERTENSION: Status: ACTIVE | Noted: 2021-01-01

## 2021-11-26 PROBLEM — N18.2 STAGE 2 CHRONIC KIDNEY DISEASE: Status: ACTIVE | Noted: 2021-01-01

## 2021-11-26 PROBLEM — F32.A DEPRESSIVE DISORDER: Status: ACTIVE | Noted: 2021-01-01

## 2021-12-01 PROBLEM — R33.9 URINARY RETENTION: Status: ACTIVE | Noted: 2021-01-01

## 2021-12-01 PROBLEM — G93.40 ENCEPHALOPATHY ACUTE: Status: ACTIVE | Noted: 2021-01-01

## 2021-12-10 PROBLEM — R29.6 MULTIPLE FALLS: Status: ACTIVE | Noted: 2021-01-01

## 2021-12-12 PROBLEM — U07.1 COVID-19: Status: ACTIVE | Noted: 2021-01-01

## 2021-12-17 PROBLEM — N39.0 UTI (URINARY TRACT INFECTION): Status: ACTIVE | Noted: 2021-01-01

## 2022-01-01 ENCOUNTER — APPOINTMENT (INPATIENT)
Dept: RADIOLOGY | Facility: HOSPITAL | Age: 82
DRG: 026 | End: 2022-01-01
Payer: MEDICARE

## 2022-01-01 ENCOUNTER — ANESTHESIA EVENT (OUTPATIENT)
Dept: PERIOP | Facility: HOSPITAL | Age: 82
DRG: 026 | End: 2022-01-01
Payer: MEDICARE

## 2022-01-01 ENCOUNTER — TELEMEDICINE (OUTPATIENT)
Dept: NEUROSURGERY | Facility: CLINIC | Age: 82
End: 2022-01-01

## 2022-01-01 ENCOUNTER — TELEPHONE (OUTPATIENT)
Dept: NEUROSURGERY | Facility: CLINIC | Age: 82
End: 2022-01-01

## 2022-01-01 ENCOUNTER — ANESTHESIA (OUTPATIENT)
Dept: PERIOP | Facility: HOSPITAL | Age: 82
DRG: 026 | End: 2022-01-01
Payer: MEDICARE

## 2022-01-01 ENCOUNTER — HOSPITAL ENCOUNTER (INPATIENT)
Facility: HOSPITAL | Age: 82
LOS: 6 days | DRG: 026 | End: 2022-01-27
Attending: STUDENT IN AN ORGANIZED HEALTH CARE EDUCATION/TRAINING PROGRAM | Admitting: SURGERY
Payer: MEDICARE

## 2022-01-01 ENCOUNTER — DOCUMENTATION (OUTPATIENT)
Dept: NEUROSURGERY | Facility: CLINIC | Age: 82
End: 2022-01-01

## 2022-01-01 VITALS
OXYGEN SATURATION: 95 % | DIASTOLIC BLOOD PRESSURE: 94 MMHG | BODY MASS INDEX: 15.02 KG/M2 | RESPIRATION RATE: 17 BRPM | HEIGHT: 69 IN | HEART RATE: 106 BPM | WEIGHT: 101.41 LBS | TEMPERATURE: 98.6 F | SYSTOLIC BLOOD PRESSURE: 113 MMHG

## 2022-01-01 DIAGNOSIS — S06.5X9A SUBDURAL HEMATOMA (HCC): Primary | ICD-10-CM

## 2022-01-01 DIAGNOSIS — G93.40 ENCEPHALOPATHY ACUTE: ICD-10-CM

## 2022-01-01 DIAGNOSIS — U07.1 COVID-19: ICD-10-CM

## 2022-01-01 DIAGNOSIS — Z11.59 SCREENING FOR VIRAL DISEASE: Primary | ICD-10-CM

## 2022-01-01 DIAGNOSIS — E44.0 MODERATE PROTEIN-CALORIE MALNUTRITION (HCC): ICD-10-CM

## 2022-01-01 DIAGNOSIS — R29.6 MULTIPLE FALLS: ICD-10-CM

## 2022-01-01 DIAGNOSIS — S06.5X9A SUBDURAL HEMATOMA (HCC): ICD-10-CM

## 2022-01-01 LAB
ABO GROUP BLD BPU: NORMAL
ABO GROUP BLD BPU: NORMAL
ABO GROUP BLD: NORMAL
ANION GAP SERPL CALCULATED.3IONS-SCNC: 3 MMOL/L (ref 4–13)
ANION GAP SERPL CALCULATED.3IONS-SCNC: 4 MMOL/L (ref 4–13)
ANION GAP SERPL CALCULATED.3IONS-SCNC: 5 MMOL/L (ref 4–13)
ANION GAP SERPL CALCULATED.3IONS-SCNC: 7 MMOL/L (ref 4–13)
APTT PPP: 38 SECONDS (ref 23–37)
BASOPHILS # BLD AUTO: 0.02 THOUSANDS/ΜL (ref 0–0.1)
BASOPHILS NFR BLD AUTO: 0 % (ref 0–1)
BLD GP AB SCN SERPL QL: NEGATIVE
BPU ID: NORMAL
BPU ID: NORMAL
BUN SERPL-MCNC: 10 MG/DL (ref 5–25)
BUN SERPL-MCNC: 8 MG/DL (ref 5–25)
BUN SERPL-MCNC: 8 MG/DL (ref 5–25)
BUN SERPL-MCNC: 9 MG/DL (ref 5–25)
CALCIUM SERPL-MCNC: 8.3 MG/DL (ref 8.3–10.1)
CALCIUM SERPL-MCNC: 8.5 MG/DL (ref 8.3–10.1)
CHLORIDE SERPL-SCNC: 106 MMOL/L (ref 100–108)
CHLORIDE SERPL-SCNC: 108 MMOL/L (ref 100–108)
CHLORIDE SERPL-SCNC: 109 MMOL/L (ref 100–108)
CHLORIDE SERPL-SCNC: 110 MMOL/L (ref 100–108)
CO2 SERPL-SCNC: 23 MMOL/L (ref 21–32)
CO2 SERPL-SCNC: 25 MMOL/L (ref 21–32)
CO2 SERPL-SCNC: 26 MMOL/L (ref 21–32)
CO2 SERPL-SCNC: 26 MMOL/L (ref 21–32)
CREAT SERPL-MCNC: 0.45 MG/DL (ref 0.6–1.3)
CREAT SERPL-MCNC: 0.54 MG/DL (ref 0.6–1.3)
CREAT SERPL-MCNC: 0.64 MG/DL (ref 0.6–1.3)
CREAT SERPL-MCNC: 0.73 MG/DL (ref 0.6–1.3)
CROSSMATCH: NORMAL
CROSSMATCH: NORMAL
EOSINOPHIL # BLD AUTO: 0.04 THOUSAND/ΜL (ref 0–0.61)
EOSINOPHIL NFR BLD AUTO: 0 % (ref 0–6)
ERYTHROCYTE [DISTWIDTH] IN BLOOD BY AUTOMATED COUNT: 13.4 % (ref 11.6–15.1)
ERYTHROCYTE [DISTWIDTH] IN BLOOD BY AUTOMATED COUNT: 13.7 % (ref 11.6–15.1)
ERYTHROCYTE [DISTWIDTH] IN BLOOD BY AUTOMATED COUNT: 13.7 % (ref 11.6–15.1)
FLUAV RNA RESP QL NAA+PROBE: NEGATIVE
FLUBV RNA RESP QL NAA+PROBE: NEGATIVE
GFR SERPL CREATININE-BSD FRML MDRD: 107 ML/MIN/1.73SQ M
GFR SERPL CREATININE-BSD FRML MDRD: 88 ML/MIN/1.73SQ M
GFR SERPL CREATININE-BSD FRML MDRD: 93 ML/MIN/1.73SQ M
GFR SERPL CREATININE-BSD FRML MDRD: 99 ML/MIN/1.73SQ M
GLUCOSE SERPL-MCNC: 109 MG/DL (ref 65–140)
GLUCOSE SERPL-MCNC: 110 MG/DL (ref 65–140)
GLUCOSE SERPL-MCNC: 66 MG/DL (ref 65–140)
GLUCOSE SERPL-MCNC: 76 MG/DL (ref 65–140)
GLUCOSE SERPL-MCNC: 84 MG/DL (ref 65–140)
HCT VFR BLD AUTO: 25.5 % (ref 36.5–49.3)
HCT VFR BLD AUTO: 28.4 % (ref 36.5–49.3)
HCT VFR BLD AUTO: 28.9 % (ref 36.5–49.3)
HGB BLD-MCNC: 8 G/DL (ref 12–17)
HGB BLD-MCNC: 8.8 G/DL (ref 12–17)
HGB BLD-MCNC: 9 G/DL (ref 12–17)
IMM GRANULOCYTES # BLD AUTO: 0.04 THOUSAND/UL (ref 0–0.2)
IMM GRANULOCYTES NFR BLD AUTO: 0 % (ref 0–2)
INR PPP: 1.27 (ref 0.84–1.19)
LYMPHOCYTES # BLD AUTO: 0.61 THOUSANDS/ΜL (ref 0.6–4.47)
LYMPHOCYTES NFR BLD AUTO: 6 % (ref 14–44)
MAGNESIUM SERPL-MCNC: 1.8 MG/DL (ref 1.6–2.6)
MCH RBC QN AUTO: 28.5 PG (ref 26.8–34.3)
MCH RBC QN AUTO: 28.6 PG (ref 26.8–34.3)
MCH RBC QN AUTO: 28.8 PG (ref 26.8–34.3)
MCHC RBC AUTO-ENTMCNC: 31 G/DL (ref 31.4–37.4)
MCHC RBC AUTO-ENTMCNC: 31.1 G/DL (ref 31.4–37.4)
MCHC RBC AUTO-ENTMCNC: 31.4 G/DL (ref 31.4–37.4)
MCV RBC AUTO: 92 FL (ref 82–98)
MONOCYTES # BLD AUTO: 0.61 THOUSAND/ΜL (ref 0.17–1.22)
MONOCYTES NFR BLD AUTO: 6 % (ref 4–12)
NEUTROPHILS # BLD AUTO: 9.49 THOUSANDS/ΜL (ref 1.85–7.62)
NEUTS SEG NFR BLD AUTO: 88 % (ref 43–75)
NRBC BLD AUTO-RTO: 0 /100 WBCS
PLATELET # BLD AUTO: 315 THOUSANDS/UL (ref 149–390)
PLATELET # BLD AUTO: 326 THOUSANDS/UL (ref 149–390)
PLATELET # BLD AUTO: 344 THOUSANDS/UL (ref 149–390)
PMV BLD AUTO: 10.1 FL (ref 8.9–12.7)
PMV BLD AUTO: 10.1 FL (ref 8.9–12.7)
PMV BLD AUTO: 9.9 FL (ref 8.9–12.7)
POTASSIUM SERPL-SCNC: 3.4 MMOL/L (ref 3.5–5.3)
POTASSIUM SERPL-SCNC: 3.5 MMOL/L (ref 3.5–5.3)
POTASSIUM SERPL-SCNC: 3.7 MMOL/L (ref 3.5–5.3)
POTASSIUM SERPL-SCNC: 3.9 MMOL/L (ref 3.5–5.3)
PROTHROMBIN TIME: 15.4 SECONDS (ref 11.6–14.5)
RBC # BLD AUTO: 2.78 MILLION/UL (ref 3.88–5.62)
RBC # BLD AUTO: 3.09 MILLION/UL (ref 3.88–5.62)
RBC # BLD AUTO: 3.15 MILLION/UL (ref 3.88–5.62)
RH BLD: POSITIVE
RSV RNA RESP QL NAA+PROBE: NEGATIVE
SARS-COV-2 RNA RESP QL NAA+PROBE: NEGATIVE
SODIUM SERPL-SCNC: 136 MMOL/L (ref 136–145)
SODIUM SERPL-SCNC: 138 MMOL/L (ref 136–145)
SODIUM SERPL-SCNC: 139 MMOL/L (ref 136–145)
SODIUM SERPL-SCNC: 139 MMOL/L (ref 136–145)
SPECIMEN EXPIRATION DATE: NORMAL
UNIT DISPENSE STATUS: NORMAL
UNIT DISPENSE STATUS: NORMAL
UNIT PRODUCT CODE: NORMAL
UNIT PRODUCT CODE: NORMAL
UNIT PRODUCT VOLUME: 300 ML
UNIT PRODUCT VOLUME: 350 ML
UNIT RH: NORMAL
UNIT RH: NORMAL
WBC # BLD AUTO: 10.81 THOUSAND/UL (ref 4.31–10.16)
WBC # BLD AUTO: 8.28 THOUSAND/UL (ref 4.31–10.16)
WBC # BLD AUTO: 8.85 THOUSAND/UL (ref 4.31–10.16)

## 2022-01-01 PROCEDURE — 99232 SBSQ HOSP IP/OBS MODERATE 35: CPT | Performed by: STUDENT IN AN ORGANIZED HEALTH CARE EDUCATION/TRAINING PROGRAM

## 2022-01-01 PROCEDURE — C9113 INJ PANTOPRAZOLE SODIUM, VIA: HCPCS | Performed by: PHYSICIAN ASSISTANT

## 2022-01-01 PROCEDURE — 85027 COMPLETE CBC AUTOMATED: CPT | Performed by: PHYSICIAN ASSISTANT

## 2022-01-01 PROCEDURE — 70450 CT HEAD/BRAIN W/O DYE: CPT

## 2022-01-01 PROCEDURE — 99233 SBSQ HOSP IP/OBS HIGH 50: CPT | Performed by: SURGERY

## 2022-01-01 PROCEDURE — 61312 CRNEC/CRNOT STTL XDRL/SDRL: CPT | Performed by: STUDENT IN AN ORGANIZED HEALTH CARE EDUCATION/TRAINING PROGRAM

## 2022-01-01 PROCEDURE — 83735 ASSAY OF MAGNESIUM: CPT | Performed by: PHYSICIAN ASSISTANT

## 2022-01-01 PROCEDURE — 0241U HB NFCT DS VIR RESP RNA 4 TRGT: CPT | Performed by: STUDENT IN AN ORGANIZED HEALTH CARE EDUCATION/TRAINING PROGRAM

## 2022-01-01 PROCEDURE — NC001 PR NO CHARGE: Performed by: SURGERY

## 2022-01-01 PROCEDURE — 80048 BASIC METABOLIC PNL TOTAL CA: CPT | Performed by: REGISTERED NURSE

## 2022-01-01 PROCEDURE — 99024 POSTOP FOLLOW-UP VISIT: CPT | Performed by: STUDENT IN AN ORGANIZED HEALTH CARE EDUCATION/TRAINING PROGRAM

## 2022-01-01 PROCEDURE — 99222 1ST HOSP IP/OBS MODERATE 55: CPT | Performed by: INTERNAL MEDICINE

## 2022-01-01 PROCEDURE — 80048 BASIC METABOLIC PNL TOTAL CA: CPT | Performed by: PHYSICIAN ASSISTANT

## 2022-01-01 PROCEDURE — G1004 CDSM NDSC: HCPCS

## 2022-01-01 PROCEDURE — 85610 PROTHROMBIN TIME: CPT | Performed by: PHYSICIAN ASSISTANT

## 2022-01-01 PROCEDURE — 86900 BLOOD TYPING SEROLOGIC ABO: CPT | Performed by: STUDENT IN AN ORGANIZED HEALTH CARE EDUCATION/TRAINING PROGRAM

## 2022-01-01 PROCEDURE — 70551 MRI BRAIN STEM W/O DYE: CPT

## 2022-01-01 PROCEDURE — 86923 COMPATIBILITY TEST ELECTRIC: CPT

## 2022-01-01 PROCEDURE — 97167 OT EVAL HIGH COMPLEX 60 MIN: CPT

## 2022-01-01 PROCEDURE — 99024 POSTOP FOLLOW-UP VISIT: CPT | Performed by: NEUROLOGICAL SURGERY

## 2022-01-01 PROCEDURE — 85025 COMPLETE CBC W/AUTO DIFF WBC: CPT | Performed by: SURGERY

## 2022-01-01 PROCEDURE — 009400Z DRAINAGE OF INTRACRANIAL SUBDURAL SPACE WITH DRAINAGE DEVICE, OPEN APPROACH: ICD-10-PCS | Performed by: STUDENT IN AN ORGANIZED HEALTH CARE EDUCATION/TRAINING PROGRAM

## 2022-01-01 PROCEDURE — C1713 ANCHOR/SCREW BN/BN,TIS/BN: HCPCS | Performed by: STUDENT IN AN ORGANIZED HEALTH CARE EDUCATION/TRAINING PROGRAM

## 2022-01-01 PROCEDURE — 99291 CRITICAL CARE FIRST HOUR: CPT | Performed by: SURGERY

## 2022-01-01 PROCEDURE — 92526 ORAL FUNCTION THERAPY: CPT

## 2022-01-01 PROCEDURE — 99232 SBSQ HOSP IP/OBS MODERATE 35: CPT | Performed by: INTERNAL MEDICINE

## 2022-01-01 PROCEDURE — 92610 EVALUATE SWALLOWING FUNCTION: CPT

## 2022-01-01 PROCEDURE — 82948 REAGENT STRIP/BLOOD GLUCOSE: CPT

## 2022-01-01 PROCEDURE — 86850 RBC ANTIBODY SCREEN: CPT | Performed by: STUDENT IN AN ORGANIZED HEALTH CARE EDUCATION/TRAINING PROGRAM

## 2022-01-01 PROCEDURE — 97163 PT EVAL HIGH COMPLEX 45 MIN: CPT

## 2022-01-01 PROCEDURE — 99238 HOSP IP/OBS DSCHRG MGMT 30/<: CPT | Performed by: STUDENT IN AN ORGANIZED HEALTH CARE EDUCATION/TRAINING PROGRAM

## 2022-01-01 PROCEDURE — 85730 THROMBOPLASTIN TIME PARTIAL: CPT | Performed by: PHYSICIAN ASSISTANT

## 2022-01-01 PROCEDURE — 99223 1ST HOSP IP/OBS HIGH 75: CPT | Performed by: FAMILY MEDICINE

## 2022-01-01 PROCEDURE — 80048 BASIC METABOLIC PNL TOTAL CA: CPT | Performed by: SURGERY

## 2022-01-01 PROCEDURE — 86901 BLOOD TYPING SEROLOGIC RH(D): CPT | Performed by: STUDENT IN AN ORGANIZED HEALTH CARE EDUCATION/TRAINING PROGRAM

## 2022-01-01 DEVICE — IMPLANTABLE DEVICE
Type: IMPLANTABLE DEVICE | Site: CRANIAL | Status: FUNCTIONAL
Brand: THINFLAP SYSTEM

## 2022-01-01 DEVICE — IMPLANTABLE DEVICE
Type: IMPLANTABLE DEVICE | Site: CRANIAL | Status: FUNCTIONAL
Brand: THINFLAP

## 2022-01-01 RX ORDER — ALBUMIN, HUMAN INJ 5% 5 %
SOLUTION INTRAVENOUS CONTINUOUS PRN
Status: DISCONTINUED | OUTPATIENT
Start: 2022-01-01 | End: 2022-01-01

## 2022-01-01 RX ORDER — HYDROMORPHONE HCL/PF 1 MG/ML
0.3 SYRINGE (ML) INJECTION EVERY 2 HOUR PRN
Status: CANCELLED | OUTPATIENT
Start: 2022-01-01

## 2022-01-01 RX ORDER — METOPROLOL TARTRATE 5 MG/5ML
5 INJECTION INTRAVENOUS EVERY 6 HOURS
Status: DISCONTINUED | OUTPATIENT
Start: 2022-01-01 | End: 2022-01-01

## 2022-01-01 RX ORDER — EPHEDRINE SULFATE 50 MG/ML
INJECTION INTRAVENOUS AS NEEDED
Status: DISCONTINUED | OUTPATIENT
Start: 2022-01-01 | End: 2022-01-01

## 2022-01-01 RX ORDER — ONDANSETRON 2 MG/ML
4 INJECTION INTRAMUSCULAR; INTRAVENOUS EVERY 6 HOURS PRN
Status: DISCONTINUED | OUTPATIENT
Start: 2022-01-01 | End: 2022-01-01

## 2022-01-01 RX ORDER — FINASTERIDE 5 MG/1
5 TABLET, FILM COATED ORAL DAILY
Status: DISCONTINUED | OUTPATIENT
Start: 2022-01-01 | End: 2022-01-01

## 2022-01-01 RX ORDER — HYDROMORPHONE HCL/PF 1 MG/ML
0.3 SYRINGE (ML) INJECTION EVERY 2 HOUR PRN
Status: DISCONTINUED | OUTPATIENT
Start: 2022-01-01 | End: 2022-01-01 | Stop reason: HOSPADM

## 2022-01-01 RX ORDER — LORAZEPAM 2 MG/ML
1 INJECTION INTRAMUSCULAR
Status: DISCONTINUED | OUTPATIENT
Start: 2022-01-01 | End: 2022-01-01 | Stop reason: HOSPADM

## 2022-01-01 RX ORDER — LEVETIRACETAM 500 MG/1
500 TABLET ORAL EVERY 12 HOURS SCHEDULED
Status: DISCONTINUED | OUTPATIENT
Start: 2022-01-01 | End: 2022-01-01

## 2022-01-01 RX ORDER — POTASSIUM CHLORIDE 750 MG/1
10 TABLET, EXTENDED RELEASE ORAL DAILY
Status: DISCONTINUED | OUTPATIENT
Start: 2022-01-01 | End: 2022-01-01

## 2022-01-01 RX ORDER — METOCLOPRAMIDE HYDROCHLORIDE 5 MG/ML
10 INJECTION INTRAMUSCULAR; INTRAVENOUS ONCE AS NEEDED
Status: DISCONTINUED | OUTPATIENT
Start: 2022-01-01 | End: 2022-01-01 | Stop reason: HOSPADM

## 2022-01-01 RX ORDER — ROCURONIUM BROMIDE 10 MG/ML
INJECTION, SOLUTION INTRAVENOUS AS NEEDED
Status: DISCONTINUED | OUTPATIENT
Start: 2022-01-01 | End: 2022-01-01

## 2022-01-01 RX ORDER — OXYCODONE HYDROCHLORIDE 5 MG/1
5 TABLET ORAL EVERY 4 HOURS PRN
Status: DISCONTINUED | OUTPATIENT
Start: 2022-01-01 | End: 2022-01-01

## 2022-01-01 RX ORDER — RISPERIDONE 0.25 MG/1
0.25 TABLET, FILM COATED ORAL 2 TIMES DAILY
COMMUNITY

## 2022-01-01 RX ORDER — CEFAZOLIN SODIUM 1 G/50ML
1000 SOLUTION INTRAVENOUS ONCE
Status: DISCONTINUED | OUTPATIENT
Start: 2022-01-01 | End: 2022-01-01 | Stop reason: HOSPADM

## 2022-01-01 RX ORDER — SODIUM CHLORIDE 9 MG/ML
50 INJECTION, SOLUTION INTRAVENOUS CONTINUOUS
Status: DISCONTINUED | OUTPATIENT
Start: 2022-01-01 | End: 2022-01-01

## 2022-01-01 RX ORDER — PRAVASTATIN SODIUM 10 MG
10 TABLET ORAL DAILY
Status: DISCONTINUED | OUTPATIENT
Start: 2022-01-01 | End: 2022-01-01

## 2022-01-01 RX ORDER — MAGNESIUM HYDROXIDE 1200 MG/15ML
LIQUID ORAL AS NEEDED
Status: DISCONTINUED | OUTPATIENT
Start: 2022-01-01 | End: 2022-01-01 | Stop reason: HOSPADM

## 2022-01-01 RX ORDER — NEOSTIGMINE METHYLSULFATE 1 MG/ML
INJECTION INTRAVENOUS AS NEEDED
Status: DISCONTINUED | OUTPATIENT
Start: 2022-01-01 | End: 2022-01-01

## 2022-01-01 RX ORDER — OXYCODONE HYDROCHLORIDE 5 MG/1
2.5 TABLET ORAL EVERY 4 HOURS PRN
Status: DISCONTINUED | OUTPATIENT
Start: 2022-01-01 | End: 2022-01-01

## 2022-01-01 RX ORDER — CEFAZOLIN SODIUM 1 G/50ML
1000 SOLUTION INTRAVENOUS EVERY 8 HOURS
Status: COMPLETED | OUTPATIENT
Start: 2022-01-01 | End: 2022-01-01

## 2022-01-01 RX ORDER — DIPHENHYDRAMINE HYDROCHLORIDE 50 MG/ML
12.5 INJECTION INTRAMUSCULAR; INTRAVENOUS ONCE AS NEEDED
Status: DISCONTINUED | OUTPATIENT
Start: 2022-01-01 | End: 2022-01-01 | Stop reason: HOSPADM

## 2022-01-01 RX ORDER — SODIUM CHLORIDE, SODIUM LACTATE, POTASSIUM CHLORIDE, CALCIUM CHLORIDE 600; 310; 30; 20 MG/100ML; MG/100ML; MG/100ML; MG/100ML
125 INJECTION, SOLUTION INTRAVENOUS CONTINUOUS
Status: DISCONTINUED | OUTPATIENT
Start: 2022-01-01 | End: 2022-01-01

## 2022-01-01 RX ORDER — PANTOPRAZOLE SODIUM 40 MG/1
40 TABLET, DELAYED RELEASE ORAL
Status: DISCONTINUED | OUTPATIENT
Start: 2022-01-01 | End: 2022-01-01

## 2022-01-01 RX ORDER — HYDROMORPHONE HCL/PF 1 MG/ML
0.5 SYRINGE (ML) INJECTION
Status: DISCONTINUED | OUTPATIENT
Start: 2022-01-01 | End: 2022-01-01 | Stop reason: HOSPADM

## 2022-01-01 RX ORDER — AMMONIUM LACTATE 12 G/100G
LOTION TOPICAL 2 TIMES DAILY
Status: DISCONTINUED | OUTPATIENT
Start: 2022-01-01 | End: 2022-01-01

## 2022-01-01 RX ORDER — SODIUM CHLORIDE 9 MG/ML
125 INJECTION, SOLUTION INTRAVENOUS CONTINUOUS
Status: CANCELLED | OUTPATIENT
Start: 2022-01-01

## 2022-01-01 RX ORDER — CHLORHEXIDINE GLUCONATE 0.12 MG/ML
15 RINSE ORAL 2 TIMES DAILY
COMMUNITY

## 2022-01-01 RX ORDER — HYDROMORPHONE HCL IN WATER/PF 6 MG/30 ML
0.2 PATIENT CONTROLLED ANALGESIA SYRINGE INTRAVENOUS
Status: DISCONTINUED | OUTPATIENT
Start: 2022-01-01 | End: 2022-01-01 | Stop reason: HOSPADM

## 2022-01-01 RX ORDER — SODIUM CHLORIDE 9 MG/ML
125 INJECTION, SOLUTION INTRAVENOUS CONTINUOUS
Status: DISCONTINUED | OUTPATIENT
Start: 2022-01-01 | End: 2022-01-01

## 2022-01-01 RX ORDER — ESCITALOPRAM OXALATE 10 MG/1
10 TABLET ORAL DAILY
Status: DISCONTINUED | OUTPATIENT
Start: 2022-01-01 | End: 2022-01-01

## 2022-01-01 RX ORDER — ONDANSETRON 2 MG/ML
INJECTION INTRAMUSCULAR; INTRAVENOUS AS NEEDED
Status: DISCONTINUED | OUTPATIENT
Start: 2022-01-01 | End: 2022-01-01

## 2022-01-01 RX ORDER — RISPERIDONE 1 MG/1
2 TABLET, FILM COATED ORAL
Status: DISCONTINUED | OUTPATIENT
Start: 2022-01-01 | End: 2022-01-01

## 2022-01-01 RX ORDER — SENNOSIDES 8.6 MG
1 TABLET ORAL DAILY
Status: DISCONTINUED | OUTPATIENT
Start: 2022-01-01 | End: 2022-01-01

## 2022-01-01 RX ORDER — DOCUSATE SODIUM 100 MG/1
100 CAPSULE, LIQUID FILLED ORAL 2 TIMES DAILY
Status: DISCONTINUED | OUTPATIENT
Start: 2022-01-01 | End: 2022-01-01

## 2022-01-01 RX ORDER — NOREPINEPHRINE BITARTRATE 1 MG/ML
INJECTION, SOLUTION INTRAVENOUS AS NEEDED
Status: DISCONTINUED | OUTPATIENT
Start: 2022-01-01 | End: 2022-01-01

## 2022-01-01 RX ORDER — CEFAZOLIN SODIUM 1 G/50ML
1000 SOLUTION INTRAVENOUS ONCE
Status: CANCELLED | OUTPATIENT
Start: 2022-01-01 | End: 2022-01-01

## 2022-01-01 RX ORDER — GINSENG 100 MG
1 CAPSULE ORAL 2 TIMES DAILY
COMMUNITY

## 2022-01-01 RX ORDER — POTASSIUM CHLORIDE 14.9 MG/ML
20 INJECTION INTRAVENOUS
Status: COMPLETED | OUTPATIENT
Start: 2022-01-01 | End: 2022-01-01

## 2022-01-01 RX ORDER — LORAZEPAM 2 MG/ML
0.5 INJECTION INTRAMUSCULAR
Status: DISCONTINUED | OUTPATIENT
Start: 2022-01-01 | End: 2022-01-01 | Stop reason: HOSPADM

## 2022-01-01 RX ORDER — LORAZEPAM 0.5 MG/1
0.5 TABLET ORAL
Status: DISCONTINUED | OUTPATIENT
Start: 2022-01-01 | End: 2022-01-01

## 2022-01-01 RX ORDER — LORAZEPAM 0.5 MG/1
TABLET ORAL
COMMUNITY

## 2022-01-01 RX ORDER — GLYCOPYRROLATE 0.2 MG/ML
0.1 INJECTION INTRAMUSCULAR; INTRAVENOUS EVERY 4 HOURS PRN
Status: CANCELLED | OUTPATIENT
Start: 2022-01-01

## 2022-01-01 RX ORDER — LORAZEPAM 2 MG/ML
0.5 INJECTION INTRAMUSCULAR
Status: CANCELLED | OUTPATIENT
Start: 2022-01-01

## 2022-01-01 RX ORDER — HEPARIN SODIUM 5000 [USP'U]/ML
5000 INJECTION, SOLUTION INTRAVENOUS; SUBCUTANEOUS EVERY 12 HOURS SCHEDULED
Status: DISCONTINUED | OUTPATIENT
Start: 2022-01-01 | End: 2022-01-01

## 2022-01-01 RX ORDER — AMMONIUM LACTATE 12 G/100G
LOTION TOPICAL 2 TIMES DAILY
COMMUNITY

## 2022-01-01 RX ORDER — RISPERIDONE 2 MG/1
2 TABLET, FILM COATED ORAL
COMMUNITY

## 2022-01-01 RX ORDER — PROPOFOL 10 MG/ML
INJECTION, EMULSION INTRAVENOUS AS NEEDED
Status: DISCONTINUED | OUTPATIENT
Start: 2022-01-01 | End: 2022-01-01

## 2022-01-01 RX ORDER — GLYCOPYRROLATE 0.2 MG/ML
INJECTION INTRAMUSCULAR; INTRAVENOUS AS NEEDED
Status: DISCONTINUED | OUTPATIENT
Start: 2022-01-01 | End: 2022-01-01

## 2022-01-01 RX ORDER — HYDROMORPHONE HCL/PF 1 MG/ML
0.5 SYRINGE (ML) INJECTION ONCE
Status: COMPLETED | OUTPATIENT
Start: 2022-01-01 | End: 2022-01-01

## 2022-01-01 RX ORDER — LIDOCAINE HYDROCHLORIDE AND EPINEPHRINE 10; 10 MG/ML; UG/ML
INJECTION, SOLUTION INFILTRATION; PERINEURAL AS NEEDED
Status: DISCONTINUED | OUTPATIENT
Start: 2022-01-01 | End: 2022-01-01 | Stop reason: HOSPADM

## 2022-01-01 RX ORDER — ACETAMINOPHEN 325 MG/1
975 TABLET ORAL EVERY 8 HOURS SCHEDULED
Status: DISCONTINUED | OUTPATIENT
Start: 2022-01-01 | End: 2022-01-01

## 2022-01-01 RX ORDER — HALOPERIDOL 5 MG/ML
1 INJECTION INTRAMUSCULAR EVERY 4 HOURS PRN
Status: DISCONTINUED | OUTPATIENT
Start: 2022-01-01 | End: 2022-01-01 | Stop reason: HOSPADM

## 2022-01-01 RX ORDER — PANTOPRAZOLE SODIUM 40 MG/1
40 INJECTION, POWDER, FOR SOLUTION INTRAVENOUS DAILY
Status: DISCONTINUED | OUTPATIENT
Start: 2022-01-01 | End: 2022-01-01

## 2022-01-01 RX ORDER — LORAZEPAM 2 MG/ML
1 INJECTION INTRAMUSCULAR
Status: CANCELLED | OUTPATIENT
Start: 2022-01-01

## 2022-01-01 RX ORDER — GLYCOPYRROLATE 0.2 MG/ML
0.1 INJECTION INTRAMUSCULAR; INTRAVENOUS EVERY 4 HOURS PRN
Status: DISCONTINUED | OUTPATIENT
Start: 2022-01-01 | End: 2022-01-01 | Stop reason: HOSPADM

## 2022-01-01 RX ORDER — CEFAZOLIN SODIUM 1 G/3ML
INJECTION, POWDER, FOR SOLUTION INTRAMUSCULAR; INTRAVENOUS AS NEEDED
Status: DISCONTINUED | OUTPATIENT
Start: 2022-01-01 | End: 2022-01-01

## 2022-01-01 RX ORDER — CHLORHEXIDINE GLUCONATE 0.12 MG/ML
15 RINSE ORAL ONCE
Status: CANCELLED | OUTPATIENT
Start: 2022-01-01 | End: 2022-01-01

## 2022-01-01 RX ORDER — RISPERIDONE 0.25 MG/1
0.25 TABLET, FILM COATED ORAL 2 TIMES DAILY
Status: DISCONTINUED | OUTPATIENT
Start: 2022-01-01 | End: 2022-01-01

## 2022-01-01 RX ORDER — ESCITALOPRAM OXALATE 10 MG/1
10 TABLET ORAL DAILY
COMMUNITY

## 2022-01-01 RX ORDER — ONDANSETRON 2 MG/ML
4 INJECTION INTRAMUSCULAR; INTRAVENOUS ONCE AS NEEDED
Status: DISCONTINUED | OUTPATIENT
Start: 2022-01-01 | End: 2022-01-01 | Stop reason: HOSPADM

## 2022-01-01 RX ORDER — HYDROMORPHONE HCL IN WATER/PF 6 MG/30 ML
0.2 PATIENT CONTROLLED ANALGESIA SYRINGE INTRAVENOUS EVERY 4 HOURS PRN
Status: DISCONTINUED | OUTPATIENT
Start: 2022-01-01 | End: 2022-01-01

## 2022-01-01 RX ORDER — FENTANYL CITRATE/PF 50 MCG/ML
50 SYRINGE (ML) INJECTION
Status: DISCONTINUED | OUTPATIENT
Start: 2022-01-01 | End: 2022-01-01 | Stop reason: HOSPADM

## 2022-01-01 RX ORDER — LIDOCAINE HYDROCHLORIDE 10 MG/ML
0.5 INJECTION, SOLUTION EPIDURAL; INFILTRATION; INTRACAUDAL; PERINEURAL ONCE AS NEEDED
Status: DISCONTINUED | OUTPATIENT
Start: 2022-01-01 | End: 2022-01-01

## 2022-01-01 RX ORDER — POTASSIUM CHLORIDE 20 MEQ/1
20 TABLET, EXTENDED RELEASE ORAL ONCE
Status: COMPLETED | OUTPATIENT
Start: 2022-01-01 | End: 2022-01-01

## 2022-01-01 RX ORDER — HYDROMORPHONE HCL/PF 1 MG/ML
SYRINGE (ML) INJECTION AS NEEDED
Status: DISCONTINUED | OUTPATIENT
Start: 2022-01-01 | End: 2022-01-01

## 2022-01-01 RX ORDER — BISACODYL 10 MG
10 SUPPOSITORY, RECTAL RECTAL DAILY PRN
Status: DISCONTINUED | OUTPATIENT
Start: 2022-01-01 | End: 2022-01-01

## 2022-01-01 RX ORDER — CHLORHEXIDINE GLUCONATE 0.12 MG/ML
15 RINSE ORAL ONCE
Status: COMPLETED | OUTPATIENT
Start: 2022-01-01 | End: 2022-01-01

## 2022-01-01 RX ORDER — FENTANYL CITRATE 50 UG/ML
INJECTION, SOLUTION INTRAMUSCULAR; INTRAVENOUS AS NEEDED
Status: DISCONTINUED | OUTPATIENT
Start: 2022-01-01 | End: 2022-01-01

## 2022-01-01 RX ORDER — SULFAMETHOXAZOLE AND TRIMETHOPRIM 800; 160 MG/1; MG/1
1 TABLET ORAL EVERY 12 HOURS SCHEDULED
COMMUNITY

## 2022-01-01 RX ORDER — HALOPERIDOL 5 MG/ML
1 INJECTION INTRAMUSCULAR EVERY 4 HOURS PRN
Status: CANCELLED | OUTPATIENT
Start: 2022-01-01

## 2022-01-01 RX ORDER — GINSENG 100 MG
CAPSULE ORAL AS NEEDED
Status: DISCONTINUED | OUTPATIENT
Start: 2022-01-01 | End: 2022-01-01 | Stop reason: HOSPADM

## 2022-01-01 RX ADMIN — RISPERIDONE 0.25 MG: 0.25 TABLET ORAL at 17:32

## 2022-01-01 RX ADMIN — CEFAZOLIN 1000 MG: 1 INJECTION, POWDER, FOR SOLUTION INTRAMUSCULAR; INTRAVENOUS at 08:15

## 2022-01-01 RX ADMIN — LEVETIRACETAM 500 MG: 100 INJECTION, SOLUTION INTRAVENOUS at 00:04

## 2022-01-01 RX ADMIN — HYDROMORPHONE HYDROCHLORIDE 0.3 MG: 1 INJECTION, SOLUTION INTRAMUSCULAR; INTRAVENOUS; SUBCUTANEOUS at 20:05

## 2022-01-01 RX ADMIN — STANDARDIZED SENNA CONCENTRATE 8.6 MG: 8.6 TABLET ORAL at 09:28

## 2022-01-01 RX ADMIN — NOREPINEPHRINE BITARTRATE 8 MCG: 1 INJECTION, SOLUTION, CONCENTRATE INTRAVENOUS at 09:30

## 2022-01-01 RX ADMIN — LEVETIRACETAM 500 MG: 100 INJECTION, SOLUTION INTRAVENOUS at 21:51

## 2022-01-01 RX ADMIN — METOROPROLOL TARTRATE 5 MG: 5 INJECTION, SOLUTION INTRAVENOUS at 23:50

## 2022-01-01 RX ADMIN — HEPARIN SODIUM 5000 UNITS: 5000 INJECTION INTRAVENOUS; SUBCUTANEOUS at 21:40

## 2022-01-01 RX ADMIN — HYDROMORPHONE HYDROCHLORIDE 0.25 MG: 1 INJECTION, SOLUTION INTRAMUSCULAR; INTRAVENOUS; SUBCUTANEOUS at 08:36

## 2022-01-01 RX ADMIN — POTASSIUM CHLORIDE 10 MEQ: 750 TABLET, EXTENDED RELEASE ORAL at 08:37

## 2022-01-01 RX ADMIN — METOROPROLOL TARTRATE 5 MG: 5 INJECTION, SOLUTION INTRAVENOUS at 15:46

## 2022-01-01 RX ADMIN — METOROPROLOL TARTRATE 5 MG: 5 INJECTION, SOLUTION INTRAVENOUS at 15:48

## 2022-01-01 RX ADMIN — ESCITALOPRAM OXALATE 10 MG: 10 TABLET ORAL at 09:21

## 2022-01-01 RX ADMIN — HYDROMORPHONE HYDROCHLORIDE 0.2 MG: 0.2 INJECTION, SOLUTION INTRAMUSCULAR; INTRAVENOUS; SUBCUTANEOUS at 16:26

## 2022-01-01 RX ADMIN — PANTOPRAZOLE SODIUM 40 MG: 40 INJECTION, POWDER, FOR SOLUTION INTRAVENOUS at 09:57

## 2022-01-01 RX ADMIN — HYDROMORPHONE HYDROCHLORIDE 0.3 MG: 1 INJECTION, SOLUTION INTRAMUSCULAR; INTRAVENOUS; SUBCUTANEOUS at 10:08

## 2022-01-01 RX ADMIN — HYDROMORPHONE HYDROCHLORIDE 0.3 MG: 1 INJECTION, SOLUTION INTRAMUSCULAR; INTRAVENOUS; SUBCUTANEOUS at 22:09

## 2022-01-01 RX ADMIN — RISPERIDONE 0.25 MG: 0.25 TABLET ORAL at 17:22

## 2022-01-01 RX ADMIN — STANDARDIZED SENNA CONCENTRATE 8.6 MG: 8.6 TABLET ORAL at 08:37

## 2022-01-01 RX ADMIN — NOREPINEPHRINE BITARTRATE 8 MCG: 1 INJECTION, SOLUTION, CONCENTRATE INTRAVENOUS at 09:01

## 2022-01-01 RX ADMIN — LEVETIRACETAM 500 MG: 100 INJECTION, SOLUTION INTRAVENOUS at 08:40

## 2022-01-01 RX ADMIN — ALBUMIN (HUMAN): 12.5 INJECTION, SOLUTION INTRAVENOUS at 07:54

## 2022-01-01 RX ADMIN — SODIUM CHLORIDE, SODIUM LACTATE, POTASSIUM CHLORIDE, AND CALCIUM CHLORIDE: .6; .31; .03; .02 INJECTION, SOLUTION INTRAVENOUS at 07:20

## 2022-01-01 RX ADMIN — PROPOFOL 120 MG: 10 INJECTION, EMULSION INTRAVENOUS at 07:43

## 2022-01-01 RX ADMIN — RISPERIDONE 0.25 MG: 0.25 TABLET ORAL at 09:28

## 2022-01-01 RX ADMIN — LEVETIRACETAM 500 MG: 500 TABLET, FILM COATED ORAL at 09:28

## 2022-01-01 RX ADMIN — SODIUM CHLORIDE 75 ML/HR: 9 INJECTION, SOLUTION INTRAVENOUS at 12:00

## 2022-01-01 RX ADMIN — Medication: at 08:52

## 2022-01-01 RX ADMIN — LEVETIRACETAM 500 MG: 100 INJECTION, SOLUTION INTRAVENOUS at 01:46

## 2022-01-01 RX ADMIN — NOREPINEPHRINE BITARTRATE 8 MCG: 1 INJECTION, SOLUTION, CONCENTRATE INTRAVENOUS at 09:47

## 2022-01-01 RX ADMIN — ESCITALOPRAM OXALATE 10 MG: 10 TABLET ORAL at 08:37

## 2022-01-01 RX ADMIN — CEFAZOLIN SODIUM 1000 MG: 1 SOLUTION INTRAVENOUS at 17:50

## 2022-01-01 RX ADMIN — SODIUM CHLORIDE: 0.9 INJECTION, SOLUTION INTRAVENOUS at 07:55

## 2022-01-01 RX ADMIN — POTASSIUM CHLORIDE 10 MEQ: 750 TABLET, EXTENDED RELEASE ORAL at 09:20

## 2022-01-01 RX ADMIN — NOREPINEPHRINE BITARTRATE 8 MCG: 1 INJECTION, SOLUTION, CONCENTRATE INTRAVENOUS at 08:47

## 2022-01-01 RX ADMIN — LEVETIRACETAM 500 MG: 500 TABLET, FILM COATED ORAL at 21:37

## 2022-01-01 RX ADMIN — EPHEDRINE SULFATE 10 MG: 50 INJECTION, SOLUTION INTRAVENOUS at 07:50

## 2022-01-01 RX ADMIN — PANTOPRAZOLE SODIUM 40 MG: 40 INJECTION, POWDER, FOR SOLUTION INTRAVENOUS at 09:20

## 2022-01-01 RX ADMIN — NOREPINEPHRINE BITARTRATE 8 MCG: 1 INJECTION, SOLUTION, CONCENTRATE INTRAVENOUS at 09:42

## 2022-01-01 RX ADMIN — POTASSIUM CHLORIDE 20 MEQ: 14.9 INJECTION, SOLUTION INTRAVENOUS at 11:02

## 2022-01-01 RX ADMIN — HYDROMORPHONE HYDROCHLORIDE 0.5 MG: 1 INJECTION, SOLUTION INTRAMUSCULAR; INTRAVENOUS; SUBCUTANEOUS at 15:45

## 2022-01-01 RX ADMIN — HYDROMORPHONE HYDROCHLORIDE 0.3 MG: 1 INJECTION, SOLUTION INTRAMUSCULAR; INTRAVENOUS; SUBCUTANEOUS at 12:40

## 2022-01-01 RX ADMIN — FINASTERIDE 5 MG: 5 TABLET, FILM COATED ORAL at 08:37

## 2022-01-01 RX ADMIN — NOREPINEPHRINE BITARTRATE 16 MCG: 1 INJECTION, SOLUTION, CONCENTRATE INTRAVENOUS at 08:17

## 2022-01-01 RX ADMIN — METOROPROLOL TARTRATE 5 MG: 5 INJECTION, SOLUTION INTRAVENOUS at 09:20

## 2022-01-01 RX ADMIN — CEFAZOLIN SODIUM 1000 MG: 1 SOLUTION INTRAVENOUS at 01:56

## 2022-01-01 RX ADMIN — HYDROMORPHONE HYDROCHLORIDE 0.3 MG: 1 INJECTION, SOLUTION INTRAMUSCULAR; INTRAVENOUS; SUBCUTANEOUS at 07:29

## 2022-01-01 RX ADMIN — FINASTERIDE 5 MG: 5 TABLET, FILM COATED ORAL at 09:20

## 2022-01-01 RX ADMIN — LEVETIRACETAM 500 MG: 100 INJECTION, SOLUTION INTRAVENOUS at 14:28

## 2022-01-01 RX ADMIN — CHLORHEXIDINE GLUCONATE 15 ML: 1.2 SOLUTION ORAL at 06:21

## 2022-01-01 RX ADMIN — ONDANSETRON 4 MG: 2 INJECTION INTRAMUSCULAR; INTRAVENOUS at 09:46

## 2022-01-01 RX ADMIN — Medication 1 APPLICATION: at 07:53

## 2022-01-01 RX ADMIN — NOREPINEPHRINE BITARTRATE 8 MCG: 1 INJECTION, SOLUTION, CONCENTRATE INTRAVENOUS at 09:10

## 2022-01-01 RX ADMIN — NOREPINEPHRINE BITARTRATE 80 MCG: 1 INJECTION, SOLUTION, CONCENTRATE INTRAVENOUS at 07:52

## 2022-01-01 RX ADMIN — LEVETIRACETAM 500 MG: 100 INJECTION, SOLUTION INTRAVENOUS at 12:35

## 2022-01-01 RX ADMIN — STANDARDIZED SENNA CONCENTRATE 8.6 MG: 8.6 TABLET ORAL at 09:21

## 2022-01-01 RX ADMIN — ALBUMIN (HUMAN): 12.5 INJECTION, SOLUTION INTRAVENOUS at 08:21

## 2022-01-01 RX ADMIN — SODIUM CHLORIDE 75 ML/HR: 9 INJECTION, SOLUTION INTRAVENOUS at 13:30

## 2022-01-01 RX ADMIN — HYDROMORPHONE HYDROCHLORIDE 0.3 MG: 1 INJECTION, SOLUTION INTRAMUSCULAR; INTRAVENOUS; SUBCUTANEOUS at 23:06

## 2022-01-01 RX ADMIN — Medication: at 08:43

## 2022-01-01 RX ADMIN — LEVETIRACETAM 500 MG: 100 INJECTION, SOLUTION INTRAVENOUS at 08:01

## 2022-01-01 RX ADMIN — LIDOCAINE HYDROCHLORIDE 50 MG: 20 INJECTION INTRAVENOUS at 07:43

## 2022-01-01 RX ADMIN — HYDROMORPHONE HYDROCHLORIDE 0.3 MG: 1 INJECTION, SOLUTION INTRAMUSCULAR; INTRAVENOUS; SUBCUTANEOUS at 12:44

## 2022-01-01 RX ADMIN — Medication: at 19:10

## 2022-01-01 RX ADMIN — PANTOPRAZOLE SODIUM 40 MG: 40 INJECTION, POWDER, FOR SOLUTION INTRAVENOUS at 08:37

## 2022-01-01 RX ADMIN — GLYCOPYRROLATE 0.4 MG: 0.2 INJECTION, SOLUTION INTRAMUSCULAR; INTRAVENOUS at 10:25

## 2022-01-01 RX ADMIN — HYDROMORPHONE HYDROCHLORIDE 0.3 MG: 1 INJECTION, SOLUTION INTRAMUSCULAR; INTRAVENOUS; SUBCUTANEOUS at 14:28

## 2022-01-01 RX ADMIN — POTASSIUM CHLORIDE 20 MEQ: 14.9 INJECTION, SOLUTION INTRAVENOUS at 09:19

## 2022-01-01 RX ADMIN — LORAZEPAM 0.5 MG: 2 INJECTION INTRAMUSCULAR; INTRAVENOUS at 14:03

## 2022-01-01 RX ADMIN — Medication: at 09:29

## 2022-01-01 RX ADMIN — PRAVASTATIN SODIUM 10 MG: 10 TABLET ORAL at 08:37

## 2022-01-01 RX ADMIN — SODIUM CHLORIDE, SODIUM LACTATE, POTASSIUM CHLORIDE, AND CALCIUM CHLORIDE: .6; .31; .03; .02 INJECTION, SOLUTION INTRAVENOUS at 10:40

## 2022-01-01 RX ADMIN — RISPERIDONE 2 MG: 1 TABLET ORAL at 21:45

## 2022-01-01 RX ADMIN — HEPARIN SODIUM 5000 UNITS: 5000 INJECTION INTRAVENOUS; SUBCUTANEOUS at 08:37

## 2022-01-01 RX ADMIN — CEFAZOLIN SODIUM 1000 MG: 1 SOLUTION INTRAVENOUS at 07:59

## 2022-01-01 RX ADMIN — OXYCODONE HYDROCHLORIDE 5 MG: 5 TABLET ORAL at 00:16

## 2022-01-01 RX ADMIN — ESCITALOPRAM OXALATE 10 MG: 10 TABLET ORAL at 09:28

## 2022-01-01 RX ADMIN — NOREPINEPHRINE BITARTRATE 2 MCG/KG/MIN: 1 INJECTION, SOLUTION, CONCENTRATE INTRAVENOUS at 08:25

## 2022-01-01 RX ADMIN — LEVETIRACETAM 500 MG: 100 INJECTION, SOLUTION INTRAVENOUS at 07:54

## 2022-01-01 RX ADMIN — FENTANYL CITRATE 100 MCG: 50 INJECTION INTRAMUSCULAR; INTRAVENOUS at 07:43

## 2022-01-01 RX ADMIN — METOROPROLOL TARTRATE 5 MG: 5 INJECTION, SOLUTION INTRAVENOUS at 05:00

## 2022-01-01 RX ADMIN — EPHEDRINE SULFATE 20 MG: 50 INJECTION, SOLUTION INTRAVENOUS at 07:52

## 2022-01-01 RX ADMIN — HYDROMORPHONE HYDROCHLORIDE 0.3 MG: 1 INJECTION, SOLUTION INTRAMUSCULAR; INTRAVENOUS; SUBCUTANEOUS at 10:58

## 2022-01-01 RX ADMIN — METOROPROLOL TARTRATE 5 MG: 5 INJECTION, SOLUTION INTRAVENOUS at 09:57

## 2022-01-01 RX ADMIN — HYDROMORPHONE HYDROCHLORIDE 0.3 MG: 1 INJECTION, SOLUTION INTRAMUSCULAR; INTRAVENOUS; SUBCUTANEOUS at 01:46

## 2022-01-01 RX ADMIN — Medication: at 17:32

## 2022-01-01 RX ADMIN — METOROPROLOL TARTRATE 5 MG: 5 INJECTION, SOLUTION INTRAVENOUS at 10:09

## 2022-01-01 RX ADMIN — NOREPINEPHRINE BITARTRATE 16 MCG: 1 INJECTION, SOLUTION, CONCENTRATE INTRAVENOUS at 09:57

## 2022-01-01 RX ADMIN — NOREPINEPHRINE BITARTRATE 8 MCG: 1 INJECTION, SOLUTION, CONCENTRATE INTRAVENOUS at 08:40

## 2022-01-01 RX ADMIN — ROCURONIUM BROMIDE 50 MG: 50 INJECTION, SOLUTION INTRAVENOUS at 07:43

## 2022-01-01 RX ADMIN — LEVETIRACETAM 500 MG: 100 INJECTION, SOLUTION INTRAVENOUS at 20:07

## 2022-01-01 RX ADMIN — RISPERIDONE 2 MG: 1 TABLET ORAL at 21:37

## 2022-01-01 RX ADMIN — RISPERIDONE 0.25 MG: 0.25 TABLET ORAL at 09:22

## 2022-01-01 RX ADMIN — Medication: at 17:37

## 2022-01-01 RX ADMIN — METOPROLOL TARTRATE 25 MG: 25 TABLET, FILM COATED ORAL at 21:44

## 2022-01-01 RX ADMIN — NEOSTIGMINE METHYLSULFATE 3 MG: 1 INJECTION INTRAVENOUS at 10:26

## 2022-01-01 RX ADMIN — NOREPINEPHRINE BITARTRATE 24 MCG: 1 INJECTION, SOLUTION, CONCENTRATE INTRAVENOUS at 09:50

## 2022-01-01 RX ADMIN — LEVETIRACETAM 500 MG: 500 TABLET, FILM COATED ORAL at 21:45

## 2022-01-01 RX ADMIN — METOROPROLOL TARTRATE 5 MG: 5 INJECTION, SOLUTION INTRAVENOUS at 05:01

## 2022-01-01 RX ADMIN — Medication 1 APPLICATION: at 07:57

## 2022-01-01 RX ADMIN — RISPERIDONE 0.25 MG: 0.25 TABLET ORAL at 08:37

## 2022-01-01 RX ADMIN — NOREPINEPHRINE BITARTRATE 8 MCG: 1 INJECTION, SOLUTION, CONCENTRATE INTRAVENOUS at 09:52

## 2022-01-01 RX ADMIN — NOREPINEPHRINE BITARTRATE 16 MCG: 1 INJECTION, SOLUTION, CONCENTRATE INTRAVENOUS at 08:25

## 2022-01-01 RX ADMIN — POTASSIUM CHLORIDE 20 MEQ: 1500 TABLET, EXTENDED RELEASE ORAL at 10:04

## 2022-01-01 RX ADMIN — FINASTERIDE 5 MG: 5 TABLET, FILM COATED ORAL at 09:27

## 2022-01-01 RX ADMIN — PANTOPRAZOLE SODIUM 40 MG: 40 INJECTION, POWDER, FOR SOLUTION INTRAVENOUS at 07:54

## 2022-01-01 RX ADMIN — RISPERIDONE 2 MG: 1 TABLET ORAL at 21:39

## 2022-01-01 RX ADMIN — HYDROMORPHONE HYDROCHLORIDE 0.25 MG: 1 INJECTION, SOLUTION INTRAMUSCULAR; INTRAVENOUS; SUBCUTANEOUS at 10:50

## 2022-01-01 RX ADMIN — SODIUM CHLORIDE 75 ML/HR: 9 INJECTION, SOLUTION INTRAVENOUS at 00:44

## 2022-01-01 RX ADMIN — PRAVASTATIN SODIUM 10 MG: 10 TABLET ORAL at 09:20

## 2022-01-01 RX ADMIN — PHENYLEPHRINE HYDROCHLORIDE 40 MCG/MIN: 10 INJECTION INTRAVENOUS at 07:52

## 2022-01-01 RX ADMIN — HYDROMORPHONE HYDROCHLORIDE 0.3 MG: 1 INJECTION, SOLUTION INTRAMUSCULAR; INTRAVENOUS; SUBCUTANEOUS at 17:15

## 2022-01-04 NOTE — TELEPHONE ENCOUNTER
Called facility [facility] patient is currently residing to enquire of CT Head was indeed completed  They will overnight disc of CT Head  I will Call back when received    Patient will need to be switched to virtual due to [address] for COVID protocol

## 2022-01-05 NOTE — TELEPHONE ENCOUNTER
Received disc by mail  Disc was uploaded and appointment was rescheduled to a virtual   Family was informed of change

## 2022-01-18 NOTE — PRE-PROCEDURE INSTRUCTIONS
Pre-Surgery Instructions:   Medication Instructions    ACETAMINOPHEN PO pt can take on day of surgery wtih 1-2 sips of water     Ascorbic Acid (VITAMIN C PO) pt should stop prior to surgery     bacitracin topical ointment 500 units/g topical ointment pt can use on day of surgery     bisacodyl (bisacodyl) 5 mg EC tablet pt should not take on day of surgery    chlorhexidine (PERIDEX) 0 12 % solution pt can use on day of surgery     escitalopram (LEXAPRO) 10 mg tablet pt should take on day of surgery with 1-2 sips of water     finasteride (PROSCAR) 5 mg tablet pt instructed to take on day of surgery with 1-2 sips of water     LORazepam (ATIVAN) 0 5 mg tablet pt takes at hs     metoprolol tartrate (LOPRESSOR) 25 mg tablet pt can take on day of surgery with 1-2 sips of water     multivitamin (THERAGRAN) TABS pt should stop prior to surgery     pantoprazole (PROTONIX) 40 mg tablet pt can take on day of surgery with 1-2 sips of water     POTASSIUM CHLORIDE ER PO pt can take on day of surgery with 1-2 sips of water     pravastatin (PRAVACHOL) 10 mg tablet pt can take on day of surgery with 1-2 sips of water     risperiDONE (RisperDAL) 0 25 mg tablet pt can take on day of surgery with 1-2 sips of water     risperiDONE (RisperDAL) 2 mg tablet pt takes at hs     senna (SENOKOT) 8 6 mg pt takes at hs     sulfamethoxazole-trimethoprim (Bactrim DS) 800-160 mg per tablet pt can take on day of surgery with 1-2 sips of water     Pt can have one adult with him on day of surgery  Pt to enter hospital at entrance B which is also the main entrance of [facility] in [place]  Make a left at information desk and first door on right is admitting  All shower instructions per surgeons office  Pt should stop nsaids and supplements prior to surgery

## 2022-01-21 NOTE — H&P
H&P reviewed  After examining the patient I find no changes in the patients condition since the H&P had been written  Patient personally seen and examined  Neurological examination unchanged compared to last office/progress note: oriented to self, PERRL, EOMI, very dysarthric speech, sometimes unintelligible, moves all extremities symmetrically  /76   Pulse 81   Temp (!) 96 6 °F (35 9 °C) (Tympanic)   Resp 18   Ht 5' 9" (1 753 m)   Wt 67 kg (147 lb 12 8 oz)   SpO2 98%   BMI 21 83 kg/m²      Regular cardiac rate and rhythm  No respiratory distress  Abdomen nontender  Normocephalic  Post operative instructions and medications have been reviewed with the patient and family  Assessment and Plan:    All questions have been answered to the patient and family satisfaction  Plan to proceed with Left sided craniotomy for subdural hematoma evacuation  They are in agreement with proceeding

## 2022-01-21 NOTE — ANESTHESIA PROCEDURE NOTES
Arterial Line Insertion  Performed by: Nayely Wagner CRNA  Authorized by: Batsheav Zaragoza MD   Preparation: Patient was prepped and draped in the usual sterile fashion    Indications: hemodynamic monitoring  Orientation:  Left  Location: radial artery  Sedation:  Patient sedated: yes  Analgesia: see MAR for details    Procedure Details:  Needle gauge: 20  Number of attempts: 1    Post-procedure:  Post-procedure: dressing applied  Waveform: good waveform  Patient tolerance: Patient tolerated the procedure well with no immediate complications

## 2022-01-21 NOTE — OP NOTE
PERATIVE REPORT  PATIENT NAME: Christelle Stephens    :  1940  MRN: [id number]  Pt Location: [facility] OR ROOM 18    SURGERY DATE: 2022    Surgeon(s) and Role:     * Mary Kumar MD - Primary     * Val Chavez PA-C - Assisting    Preop Diagnosis:  Subdural hematoma ([address] ) [S06 5X9A]    Post-Op Diagnosis Codes:     * Subdural hematoma ([address] ) [R97 [id number]]    Procedure(s) (LRB):  Left sided craniotomy for evacuation subdural hematoma (Left)    Specimen(s):  * No specimens in log *    Estimated Blood Loss:   350 mL    Drains:  Closed/Suction Drain Left Head Bulb (Active)   Site Description Unable to view 22 1044   Dressing Status Clean;Dry; Intact 22 1044   Drainage Appearance Serosanguineous 22 1044   Status To bulb suction 22 1025   Output (mL) 60 mL 22 1044   Number of days: 0       Closed/Suction Drain Midline Head Bulb (Active)   Site Description Unable to view 22 1044   Dressing Status Clean;Dry; Intact 22 1044   Drainage Appearance Serosanguineous 22 1044   Status To bulb suction 22 1025   Output (mL) 30 mL 22 1044   Number of days: 0       Urethral Catheter (Active)   Number of days: 36       Urethral Catheter Latex (Active)   Site Assessment Clean;Skin intact 22 1044   Collection Container Standard drainage bag 22 1044   Securement Method Leg strap 22 1044   Number of days: 0       [REMOVED] Closed/Suction Drain Left Head Bulb (Removed)   Drainage Appearance Serosanguineous 22 0947   Status To bulb suction 22 0947   Number of days: 0       Anesthesia Type:   General    Operative Indications:  Subdural hematoma ([address] ) [S06 5X9A]    Operative Findings:  Left subdural hygroma  Membrane formation with very little old residual subdural  No acute hemorrhage noted    Complications:   None    Procedure and Technique:  After obtaining informed consent, the patient was brought to the operating room    Endotracheal intubation was performed  The left side of the head was shaved and the prior incision was marked and connected in a linear fashion  The left side of the head was prepped and draped in the usual sterile fashion  The patient was then given Ancef as perioperative antibiotic  After time-out, A linear incision was made connecting the frontal and parietal incision  Scalp bleeders were controlled with monopolar cautery  Two self-retaining retractors were placed  Using the foot plate, the craniotomy was performed incorporating the prior bossman holes  The dura was cut with Metzenbaum scissors and a large membrane was noted in the subdural space  The membrane was cut and light straw colored fluid was evacuated  The fluid was not under pressure and it appeared to be hygromatous in nature  Using gentle irrigation, the fluid was evacuated  The membrane was cut and followed into the frontal, temporal and parietal regions  The underlying arachnoid was noted over the brain and did not have evidence of hematoma  The underlying brain tissue did not appear abnormal  Once the irrigation was clear and nonbloody, a 7 [place] TREVIN drain was placed in the subdural space  We then proceeded to closing  The dura was approximated using 4-0 neurolon sutures  The bone flap was placed and replaced on the skull using screws  A subgaleal drain was placed over the bone  We then proceeded to closing the wound  During incision closure, the subdural space was constantly irrigated with saline solution  The galea was then closed with 2-0 Vicryl sutures  The skin was closed with staples  Sterile dressing was then applied to the wound  I was present for the entire procedure    Surgical sign-out was performed  No qualified resident was available   Lia Moreno was present for the procedure, and provided essential assistance with proper exposure, hemostasis, subdural evacuation and wound closure, which was necessary secondary to the complex nature of this case       Patient Disposition:  PACU       SIGNATURE: Mary Kumar MD  DATE: January 21, 2022  TIME: 11:40 AM

## 2022-01-21 NOTE — PROGRESS NOTES
ICU Acceptance Note - Critical Care   Juan Mccartney 80 y o  male MRN: [id number]  Unit/Bed#: ICU 01 Encounter: [id number]      -------------------------------------------------------------------------------------------------------------  Chief Complaint: Left subdural hematoma s/p L craniotomy and evacuation    History of Present Illness   HX and PE limited by: Bard Velasco is a 80 y o  male with hx of multiple falls and previously evacuated L SDH with bossman holes by Dr Denis Zavala on 11/27/21 and bilateral MMA embolization by Dr Román Garcia on 11/30/21  Presented from nursing facility with gradual functional decline including mental status, motor skills, and speech due to residual/reaccumulation of L SDH  Neurosurgery took the patient to the OR 1/21/21 for left craniotomy and evacuation of SDH      History obtained from chart review and unobtainable from patient due to mental status   -------------------------------------------------------------------------------------------------------------  Assessment and Plan:    Neuro:    Diagnosis: Left subdural hematoma s/p L craniotomy and evacuation  o Plan:  - Neurosurgery following, appreciate recommendations  - Goal SBP < 160 mm Hg  - Continue Keppra  - Monitor subdural drain and subgaleal drain output  - Post-op CT head showed decreased size of SDH  - AM CT head  - Neuro check Q1 hour  - CAM-ICU, delirium precautions  - PT/OP  - Speech/Swallow evaluation   Diagnosis: Analgesia  o Plan:   - Oxycodone 2 5 mg and 5 mg PRN for moderate/severe pain  - Dilaudid 0 2 mg for breakthrough pain      CV:    Diagnosis: Essential HTN  o Plan:  - Post-op goal SBP < 160  - Metoprolol 25 mg IV BID    Diagnosis: Mixed HLD  o Plan:    - Pravastatin 10 mg daily      Pulm:   Diagnosis: No active issues  o Plan:   - Pulmonary hygiene  - Supplemental O2 as needed to maintain SpO2 > 92%      GI:    Diagnosis: No active issues  o Plan:   - Protonix 40 mg daily  - Zofran 4 mg Q6 hours PRN for nausea  - Bowel regimen - Dulcolax 10 mg daily PRN, Senokot 8 6 mg daily    :    Diagnosis: Hx of CKD stage 2  o Plan:   - Avoid nephrotoxic medications  - Monitor I/O and UOP   Diagnosis: Hx of urinary retention  o Plan:   - Maintain Marroquin catheter  - Monitor UOP  - Continue finasteride      F/E/N:    Plan:   o Fluids - NS @ 75 cc/hr  o Electrolytes - Monitor and replete as needed  o Nutrition - NPO for now, pending speech/swallow eval in AM      Heme/Onc:    Diagnosis: No active issues  o Plan:   - Monitor Hgb  - Transfuse for Hgb < 7      Endo:    Diagnosis: No active issues  o Plan:   - Monitor blood glucose  - Avoid hypoglycemia and hyperglycemia      ID:    Diagnosis: Surgical prophylaxis  o Plan:  - Ancef 1000 mg IV Q8 hours x 3 doses  - Monitor WBC and fever curve      MSK/Skin:    Diagnosis: No active issues  o Plan:   - Monitor surgical incisions and drains  - Local wound care  - PT/OT      Disposition: Admit to Critical Care   Code Status: Level 1 - Full Code  --------------------------------------------------------------------------------------------------------------  Review of Systems   Unable to perform ROS: Mental status change       Review of systems was unable to be performed secondary to altered mental status    Physical Exam  Vitals and nursing note reviewed  Constitutional:       Appearance: He is not toxic-appearing  Comments: Elderly male lying in bed, awake, appears pleasantly confused  Answers some questions and follows some commands, but not consistently  HENT:      Head:      Comments: S/P L craniotomy, midline and left sided drains in place  Site appears c/d/i  Right Ear: External ear normal       Left Ear: External ear normal       Nose: Nose normal       Mouth/Throat:      Mouth: Mucous membranes are moist       Pharynx: Oropharynx is clear  Eyes:      Extraocular Movements: Extraocular movements intact        Conjunctiva/sclera: Conjunctivae normal       Pupils: Pupils are equal, round, and reactive to light  Cardiovascular:      Rate and Rhythm: Normal rate and regular rhythm  Pulses: Normal pulses  Heart sounds: Normal heart sounds  No murmur heard  Pulmonary:      Effort: Pulmonary effort is normal  No respiratory distress  Breath sounds: Normal breath sounds  No wheezing  Abdominal:      General: Abdomen is flat  Bowel sounds are normal  There is no distension  Palpations: Abdomen is soft  Tenderness: There is no abdominal tenderness  There is no guarding  Skin:     General: Skin is warm and dry  Capillary Refill: Capillary refill takes less than 2 seconds  Neurological:      Comments: Awake, GCS 14  Mumbled speech, answers some questions  Follows some commands  Moves all extremities spontaneously, strength and sensation appears grossly intact and symmetric        --------------------------------------------------------------------------------------------------------------  Vitals:   Vitals:    01/21/22 1653 01/21/22 1700 01/21/22 1734 01/21/22 1800   BP:  131/55  145/67   Pulse:  80  80   Resp:  12 12 19   Temp:       TempSrc:       SpO2:  98%  97%   Weight: 67 3 kg (148 lb 5 9 oz)      Height:         Temp  Min: 96 6 °F (35 9 °C)  Max: 99 8 °F (37 7 °C)  IBW (Ideal Body Weight): 70 7 kg  Height: 5' 9" (175 3 cm)  Body mass index is 21 91 kg/m²  N/A    Laboratory and Diagnostics:        Invalid input(s):  EOSPCT                        ABG:    VBG:          Micro:        EKG: Reviewed telemetry  Imaging: I have personally reviewed pertinent reports     and I have personally reviewed pertinent films in PACS    Historical Information   Past Medical History:   Diagnosis Date    Anemia     Arthritis     At risk for falls     BPH (benign prostatic hyperplasia)     Chronic kidney disease     Chronic pain disorder     Depression     Dysphagia     Hyperlipidemia     Hypertension     Idiopathic peripheral autonomic neuropathy 8/19/2015    Urinary retention     rae cath placement    UTI (urinary tract infection)      Past Surgical History:   Procedure Laterality Date    APPENDECTOMY      BRAIN SURGERY      SHANNON HOLE FOR SUBDURAL HEMATOMA Left 11/27/2021    Procedure: Left sided shannon holes for subdural hematoma evacuation;  Surgeon: Gudelia Marroquin MD;  [facility];  Service: Neurosurgery    IR CEREBRAL ANGIOGRAPHY / INTERVENTION  11/30/2021    JOINT REPLACEMENT       Social History   Social History     Substance and Sexual Activity   Alcohol Use Not Currently     Social History     Substance and Sexual Activity   Drug Use Never     Social History     Tobacco Use   Smoking Status Former Smoker   Smokeless Tobacco Never Used     Exercise History: Unknown, wheelchair bound at nursing home  Family History:   Family History   Problem Relation Age of Onset    Alzheimer's disease Mother     Alzheimer's disease Sister      Family history unknown and I have reviewed this patient's family history and commented on sigificant items within the HPI      Medications:  Current Facility-Administered Medications   Medication Dose Route Frequency    ammonium lactate (LAC-HYDRIN) 12 % lotion   Topical BID    bisacodyl (DULCOLAX) rectal suppository 10 mg  10 mg Rectal Daily PRN    ceFAZolin (ANCEF) IVPB (premix in dextrose) 1,000 mg 50 mL  1,000 mg Intravenous Q8H    [START ON 1/22/2022] escitalopram (LEXAPRO) tablet 10 mg  10 mg Oral Daily    [START ON 1/22/2022] finasteride (PROSCAR) tablet 5 mg  5 mg Oral Daily    HYDROmorphone HCl (DILAUDID) injection 0 2 mg  0 2 mg Intravenous Q4H PRN    levETIRAcetam (KEPPRA) 500 mg in sodium chloride 0 9 % 100 mL IVPB  500 mg Intravenous Q12H [facility]    metoprolol tartrate (LOPRESSOR) tablet 25 mg  25 mg Oral Q12H [facility]    ondansetron (ZOFRAN) injection 4 mg  4 mg Intravenous Q6H PRN    oxyCODONE (ROXICODONE) IR tablet 2 5 mg  2 5 mg Oral Q4H PRN    oxyCODONE (ROXICODONE) IR tablet 5 mg  5 mg Oral Q4H PRN    [START ON 1/22/2022] pantoprazole (PROTONIX) EC tablet 40 mg  40 mg Oral Early Morning    potassium chloride (K-DUR,KLOR-CON) CR tablet 10 mEq  10 mEq Oral Daily    [START ON 1/22/2022] pravastatin (PRAVACHOL) tablet 10 mg  10 mg Oral Daily    risperiDONE (RisperDAL) tablet 0 25 mg  0 25 mg Oral BID    risperiDONE (RisperDAL) tablet 2 mg  2 mg Oral HS    [START ON 1/22/2022] senna (SENOKOT) tablet 8 6 mg  1 tablet Oral Daily    sodium chloride 0 9 % infusion  75 mL/hr Intravenous Continuous     Home medications:  Prior to Admission Medications   Prescriptions Last Dose Informant Patient Reported? Taking?    ACETAMINOPHEN PO 1/20/2022 at 2012  Yes Yes   Sig: Take 1,000 mg by mouth every 8 (eight) hours   Ascorbic Acid (VITAMIN C PO) 1/20/2022 at 0723  Yes Yes   Sig: Take 125 mg by mouth in the morning   LORazepam (ATIVAN) 0 5 mg tablet 1/20/2022 at 2012  Yes Yes   Sig: Take by mouth daily at bedtime   POTASSIUM CHLORIDE ER PO 1/20/2022 at 0723  Yes Yes   Sig: Take 10 mEq by mouth in the morning   ammonium lactate (LAC-HYDRIN) 12 % lotion 1/20/2022 at 2012  Yes Yes   Sig: Apply topically 2 (two) times a day   bacitracin topical ointment 500 units/g topical ointment Unknown at Unknown time  Yes No   Sig: Apply 1 large application topically 2 (two) times a day   bisacodyl (bisacodyl) 5 mg EC tablet Unknown at Unknown time  Yes No   Sig: Take 5 mg by mouth daily as needed for constipation   chlorhexidine (PERIDEX) 0 12 % solution 1/20/2022 at 2012  Yes Yes   Sig: Apply 15 mL to the mouth or throat 2 (two) times a day   escitalopram (LEXAPRO) 10 mg tablet 1/20/2022 at 0723  Yes Yes   Sig: Take 10 mg by mouth daily   finasteride (PROSCAR) 5 mg tablet 1/20/2022 at 0723  Yes Yes   Sig: Take 5 mg by mouth daily   metoprolol tartrate (LOPRESSOR) 25 mg tablet 1/20/2022 at 2012  Yes Yes   Sig: Take 25 mg by mouth every 12 (twelve) hours   multivitamin (THERAGRAN) TABS 1/20/2022 at 032 627 37 30  Yes Yes   Sig: Take 1 tablet by mouth daily   pantoprazole (PROTONIX) 40 mg tablet 1/20/2022 at 0723  No Yes   Sig: Take 1 tablet (40 mg total) by mouth daily in the early morning   pravastatin (PRAVACHOL) 10 mg tablet 1/20/2022 at 2012  Yes Yes   Sig: Take 10 mg by mouth daily   risperiDONE (RisperDAL) 0 25 mg tablet 1/20/2022 at 1156  Yes Yes   Sig: Take 0 25 mg by mouth 2 (two) times a day   risperiDONE (RisperDAL) 2 mg tablet 1/20/2022 at 2012  Yes Yes   Sig: Take 2 mg by mouth daily at bedtime   senna (SENOKOT) 8 6 mg 1/20/2022 at 2012  No Yes   Sig: Take 2 tablets (17 2 mg total) by mouth 2 (two) times a day   Patient taking differently: Take 17 2 mg by mouth daily at bedtime     sulfamethoxazole-trimethoprim (Bactrim DS) 800-160 mg per tablet 1/20/2022 at 2012  Yes Yes   Sig: Take 1 tablet by mouth every 12 (twelve) hours      Facility-Administered Medications: None     Allergies:  No Known Allergies  ------------------------------------------------------------------------------------------------------------  Advance Directive and Living Will:      Power of :    POLST:    ------------------------------------------------------------------------------------------------------------  Anticipated Length of Stay is > 2 midnights    Care Time Delivered:   Upon my evaluation, this patient had a high probability of imminent or life-threatening deterioration due to SDH, which required my direct attention, intervention, and personal management  I have personally provided 40 minutes of critical care time, exclusive of procedures, teaching, family meetings, and any prior time recorded by providers other than myself  Maldonado Apodaca MD        Portions of the record may have been created with voice recognition software  Occasional wrong word or "sound a like" substitutions may have occurred due to the inherent limitations of voice recognition software    Read the chart carefully and recognize, using context, where substitutions have occurred

## 2022-01-21 NOTE — PROGRESS NOTES
Neurosurgery progress note      Overnight events  No acute events overnight  Neurologically stable       Vitals:    01/22/22 0300 01/22/22 0400 01/22/22 0500 01/22/22 0600   BP: 128/98 149/83 122/58 (!) 114/34   Pulse: 90 88 84 94   Resp: 17 21 (!) 23 (!) 24   Temp:    97 9 °F (36 6 °C)   TempSrc:    Oral   SpO2: 98% 98% 97% 98%   Weight:    45 9 kg (101 lb 3 1 oz)   Height:           Lab Results   Component Value Date/Time    SODIUM 139 01/22/2022 05:55 AM    K 3 7 01/22/2022 05:55 AM    WBC 8 85 01/22/2022 05:48 AM    HGB 8 0 (L) 01/22/2022 05:48 AM    HCT 25 5 (L) 01/22/2022 05:48 AM     01/22/2022 05:48 AM         Intake/Output Summary (Last 24 hours) at 1/22/2022 0650  Last data filed at 1/22/2022 0600  Gross per 24 hour   Intake 4400 ml   Output 2375 ml   Net 2025 ml         Current Facility-Administered Medications:     ammonium lactate (LAC-HYDRIN) 12 % lotion, , Topical, BID, Maggie Barrios PA-C, Given at 01/21/22 1910    bisacodyl (DULCOLAX) rectal suppository 10 mg, 10 mg, Rectal, Daily PRN, Maggie Barrios PA-C    ceFAZolin (ANCEF) IVPB (premix in dextrose) 1,000 mg 50 mL, 1,000 mg, Intravenous, Q8H, Maggie Barrios PA-C, Stopped at 01/22/22 0200    escitalopram (LEXAPRO) tablet 10 mg, 10 mg, Oral, Daily, Maggie Barrios PA-C    finasteride (PROSCAR) tablet 5 mg, 5 mg, Oral, Daily, Maggie Barrios PA-C    HYDROmorphone HCl (DILAUDID) injection 0 2 mg, 0 2 mg, Intravenous, Q4H PRN, Maggie Barrios PA-C    levETIRAcetam (KEPPRA) 500 mg in sodium chloride 0 9 % 100 mL IVPB, 500 mg, Intravenous, Q12H [address], Valentino Fordyce, PA-C, Stopped at 01/21/22 2117    metoprolol tartrate (LOPRESSOR) tablet 25 mg, 25 mg, Oral, Q12H [address], Maggie Barrios PA-C    ondansetron (ZOFRAN) injection 4 mg, 4 mg, Intravenous, Q6H PRN, Maggie Barrios PA-C    oxyCODONE (ROXICODONE) IR tablet 2 5 mg, 2 5 mg, Oral, Q4H PRN, Maggie Barrios PA-C    oxyCODONE (ROXICODONE) IR tablet 5 mg, 5 mg, Oral, Q4H PRN, Maggie Barrios PA-C    pantoprazole (PROTONIX) EC tablet 40 mg, 40 mg, Oral, Early Morning, Maggie Barrios, PA-C    potassium chloride (K-DUR,KLOR-CON) CR tablet 10 mEq, 10 mEq, Oral, Daily, Maggie Barrios, PA-C    pravastatin (PRAVACHOL) tablet 10 mg, 10 mg, Oral, Daily, Maggie Barrios, PA-C    risperiDONE (RisperDAL) tablet 0 25 mg, 0 25 mg, Oral, BID, Maggie Barrios, PA-C    risperiDONE (RisperDAL) tablet 2 mg, 2 mg, Oral, HS, LUPE Cui-C    senna (SENOKOT) tablet 8 6 mg, 1 tablet, Oral, Daily, Maggie Barrios, PA-C    sodium chloride 0 9 % infusion, 75 mL/hr, Intravenous, Continuous, Maggie Barrios PA-C, Last Rate: 75 mL/hr at 01/22/22 0044, 75 mL/hr at 01/22/22 0044     Neurologic exam  Alert, oriented to self only, conversant, mumbles  PERRL, EOMI  Face symmetric  5/5 in all extremities, following commands briskly  Sensation intact throughout  Dressing CDI  Drains in place     Assessment  Patient is a 79-year-old male with h/o multiple falls and previously evacuated L SDH with bossman holes by Dr Flori Dowling on 11/27/21 and bilateral MMA embolization by Dr Matt Paige on 11/30/21 who p/w gradual functional decline including mental status, motor skills, and speech due to residual/reaccumulation of L SDH, now s/p left-sided craniotomy for evacuation subdural hematoma by Dr Flori Dowling on 1/21/22  Plan  -Continue neurologic monitoring in ICU   -SBP<160  -Continue Keppra  -Drain output (total 100/40 and 165/150), keep both in place today   -Postop CTH showed decreased size of SDH, repeat CTH overnight stable   -Advance diet as tolerated  -HOB 30 degrees, start to mobilize today  -PT/OT  -Hold off DVT ppx (SQH BID) until at least POD 2, ideally after drains removed  -Transfer to [facility]    Neurosurgeon

## 2022-01-21 NOTE — ANESTHESIA PREPROCEDURE EVALUATION
Procedure:  Left sided craniotomy for subdural hematoma evacuation (Left Head)    Relevant Problems   ANESTHESIA (within normal limits)      CARDIO   (+) Essential hypertension   (+) Mixed hyperlipidemia      ENDO (within normal limits)      GI/HEPATIC (within normal limits)      /RENAL   (+) Stage 2 chronic kidney disease      HEMATOLOGY (within normal limits)      MUSCULOSKELETAL (within normal limits)      NEURO/PSYCH   (+) Depressive disorder   (+) Subdural hematoma (HCC)      PULMONARY (within normal limits)      Other   (+) Encephalopathy acute   (+) Multiple falls        Physical Exam    Airway    Mallampati score: II  TM Distance: >3 FB  Neck ROM: full     Dental   No notable dental hx     Cardiovascular  Rhythm: regular, Rate: normal, Cardiovascular exam normal    Pulmonary  Pulmonary exam normal Breath sounds clear to auscultation,     Other Findings        Anesthesia Plan  ASA Score- 3     Anesthesia Type- general with ASA Monitors  Additional Monitors: arterial line  Airway Plan: ETT  Plan Factors-    Chart reviewed  Existing labs reviewed  Patient summary reviewed  Induction- intravenous  Postoperative Plan- Plan for postoperative opioid use  Informed Consent- Anesthetic plan and risks discussed with patient  I personally reviewed this patient with the CRNA  Discussed and agreed on the Anesthesia Plan with the CRNA  Geraldo Mcknight Recent labs personally reviewed:  Lab Results   Component Value Date    WBC 8 34 12/18/2021    HGB 11 4 (L) 12/18/2021     12/18/2021     Lab Results   Component Value Date    K 3 9 12/18/2021    BUN 24 12/18/2021    CREATININE 0 83 12/18/2021     Lab Results   Component Value Date    PTT 24 11/28/2021      Lab Results   Component Value Date    INR 1 11 11/28/2021       Blood type B    No results found for: Jose Ramon Garcia MD, have personally seen and evaluated the patient prior to anesthetic care    I have reviewed the pre-anesthetic record, and other medical records if appropriate to the anesthetic care  If a CRNA is involved in the case, I have reviewed the CRNA assessment, if present, and agree  Risks/benefits and alternatives discussed with patient including possible PONV, sore throat, and possibility of rare anesthetic and surgical emergencies

## 2022-01-21 NOTE — ANESTHESIA POSTPROCEDURE EVALUATION
Post-Op Assessment Note    CV Status:  Stable    Pain management: adequate     Mental Status:  Awake   Hydration Status:  Euvolemic   PONV Controlled:  Controlled   Airway Patency:  Patent      Post Op Vitals Reviewed: Yes      Staff: CRNA         No complications documented      /52 (01/21/22 1045)    Temp 97 5 °F (36 4 °C) (01/21/22 1045)    Pulse 68 (01/21/22 1045)   Resp 16 (01/21/22 1045)    SpO2 100 % (01/21/22 1045)

## 2022-01-22 NOTE — PLAN OF CARE
Problem: RESPIRATORY - ADULT  Goal: Achieves optimal ventilation and oxygenation  Description: INTERVENTIONS:  - Assess for changes in respiratory status  - Assess for changes in mentation and behavior  - Position to facilitate oxygenation and minimize respiratory effort  - Oxygen administered by appropriate delivery if ordered  - Initiate smoking cessation education as indicated  - Encourage broncho-pulmonary hygiene including cough, deep breathe, Incentive Spirometry  - Assess the need for suctioning and aspirate as needed  - Assess and instruct to report SOB or any respiratory difficulty  - Respiratory Therapy support as indicated  Outcome: Progressing     Problem: NEUROSENSORY - ADULT  Goal: Achieves stable or improved neurological status  Description: INTERVENTIONS  - Monitor and report changes in neurological status  - Monitor vital signs such as temperature, blood pressure, glucose, and any other labs ordered   - Initiate measures to prevent increased intracranial pressure  - Monitor for seizure activity and implement precautions if appropriate      Outcome: Progressing  Goal: Remains free of injury related to seizures activity  Description: INTERVENTIONS  - Maintain airway, patient safety  and administer oxygen as ordered  - Monitor patient for seizure activity, document and report duration and description of seizure to physician/advanced practitioner  - If seizure occurs,  ensure patient safety during seizure  - Reorient patient post seizure  - Seizure pads on all 4 side rails  - Instruct patient/family to notify RN of any seizure activity including if an aura is experienced  - Instruct patient/family to call for assistance with activity based on nursing assessment  - Administer anti-seizure medications if ordered    Outcome: Progressing  Goal: Achieves maximal functionality and self care  Description: INTERVENTIONS  - Monitor swallowing and airway patency with patient fatigue and changes in neurological status  - Encourage and assist patient to increase activity and self care     - Encourage visually impaired, hearing impaired and aphasic patients to use assistive/communication devices  Outcome: Progressing

## 2022-01-22 NOTE — SPEECH THERAPY NOTE
Speech-Language Pathology Bedside Swallow Evaluation    Patient Name: Kassi Pollard    QYPJD'N Date: 1/22/2022     Problem List  Principal Problem:    Subdural hematoma [facility])    Past Medical History  Past Medical History:   Diagnosis Date    Anemia     Arthritis     At risk for falls     BPH (benign prostatic hyperplasia)     Chronic kidney disease     Chronic pain disorder     Depression     Dysphagia     Hyperlipidemia     Hypertension     Idiopathic peripheral autonomic neuropathy 8/19/2015    Urinary retention     rae cath placement    UTI (urinary tract infection)      Past Surgical History  Past Surgical History:   Procedure Laterality Date    APPENDECTOMY      BRAIN SURGERY      SHANNON HOLE FOR SUBDURAL HEMATOMA Left 11/27/2021    Procedure: Left sided shannon holes for subdural hematoma evacuation;  Surgeon: Joaquin Wen MD;  [facility];  Service: Neurosurgery    IR CEREBRAL ANGIOGRAPHY / INTERVENTION  11/30/2021    JOINT REPLACEMENT         Summary  Pt presented with s/s suggestive of severe oral and suspected mild-moderate pharyngeal dysphagia  Symptoms or concerns included holding all PO trials in oral cavity, swishing, and spitting  Suspect oral dysphagia exacerbated by AMS  Few swallows did appear functional, however still unable to elicit full oral clearance without expectoration/need for suction of materials held in mouth  No immediate s/s aspiration with the few swallows that were elicited, however pt had a harsh wet cough after completion of trials  Maintain NPO status now and ST will follow for potential to initiate diet as appropriate  Risk/s for Aspiration: mod    Recommended Diet: NPO   Recommended Form of Meds: non-oral if possible   Aspiration precautions and swallowing strategies: ST to re assess prior to initiating a diet    Other Recommendations: Continue frequent oral care      Current Medical Status per trauma note 1/21/22  Kassi Pollard is a 80 y o  male with hx of multiple falls and previously evacuated L SDH with bossman holes by Dr Anthony Wynne on 11/27/21 and bilateral MMA embolization by Dr Shayne Sanderson on 11/30/21  Presented from nursing facility with gradual functional decline including mental status, motor skills, and speech due to residual/reaccumulation of L SDH  Neurosurgery took the patient to the OR 1/21/21 for left craniotomy and evacuation of SDH  Special Studies:  CT head 1/22/22 IMPRESSION:  Redemonstrated residual left and thin right subdural collections not significantly changed compared to prior study dated 1/21/2022  No significant midline shift  Social/Education/Vocational Hx:  Pt lives in SNF/ECF    Swallow Information   Current Risks for Dysphagia & Aspiration: known history of dysphagia, recent surgery and recent intubation  Current Symptoms/Concerns: AMS  Current Diet: NPO   Baseline Diet: mechanically altered/level 2 diet and thin liquids, Was on NTL just prior to previous d/c      Baseline Assessment   Behavior/Cognition: alert but restless/poor attention and cueing needed to remain alert  Speech/Language Status: not able to to follow commands consistently, garbled speech  Patient Positioning: upright in bed  Pain Status/Interventions/Response to Interventions: No report of or nonverbal indications of pain  Swallow Mechanism Exam  Facial: masked facies  Labial: unable to test 2/2 limited command following  Lingual: unable to test 2/2 limited command following  Velum: symmetrical  Mandible: adequate ROM  Dentition: limited dentition  Vocal quality:clear/adequate   Volitional Cough: unable to initiate volitional cough   Respiratory Status: on RA       Consistencies Assessed and Performance   Consistencies Administered: ice chips, thin liquids and puree    Oral Stage: severe, suspect exacerbated by cognitive status  Able to draw through straw however pt would then "swish and spit" despite cues to swallow   No A-P transfer with apple sauce, pt expectorated all trials  Pharyngeal Stage: mild-moderate  Few swallows (likely reflexive response to spillage), appeared functional with no immediate s/s aspiration, however unable to fully assess due to pt refusal to swallow most trials  Esophageal Concerns: none reported      Summary and Recommendations (see above)    Results Reviewed with: RN and MD     Treatment Recommended: yes     Frequency of treatment: 3-5x/week      Dysphagia LTG  -Patient will demonstrate safe and effective oral intake (without overt s/s significant oral/pharyngeal dysphagia including s/s penetration or aspiration) for the highest appropriate diet level       Short Term Goals:  -Pt will tolerate Dysphagia 1/pureed diet and nectar thick liquid with no significant s/s oral or pharyngeal dysphagia across 1-3 diagnostic sessions     -Patient will tolerate trials of upgraded food and/or liquid texture with no significant s/s of oral or pharyngeal dysphagia including aspiration across 1-3 diagnostic sessions

## 2022-01-22 NOTE — UTILIZATION REVIEW
Inpatient Admission Authorization Request   NOTIFICATION OF INPATIENT ADMISSION/INPATIENT AUTHORIZATION REQUEST   SERVICING FACILITY:   [facility]  Address: [address] [place] [address], [address] [place] [address]  Tax ID: [id number]  NPI: [id number]  Place of Service: Inpatient [address] [facility] Code: 24     ATTENDING PROVIDER:  Attending Name and NPI#: Nestor Anaya Md [[id number]]  Address: [address] [place] [address], [address] [place] [address]  Phone: [phone]     UTILIZATION REVIEW CONTACT:  Katherine Matute, Utilization   Network Utilization Review Department  Phone: [phone]  Fax: [phone]  Email: [email or web address]     PHYSICIAN ADVISORY SERVICES:  FOR YEUA-WQ-WCRE REVIEW - MEDICAL NECESSITY DENIAL  Phone: [phone]  Fax: [phone]  Email: [email or web address]     TYPE OF REQUEST:  Inpatient Status     ADMISSION INFORMATION:  ADMISSION DATE/TIME: 1/21/22 10:30 AM  PATIENT DIAGNOSIS CODE/DESCRIPTION:  Subdural hematoma ([address] ) [[id number]]  DISCHARGE DATE/TIME: No discharge date for patient encounter  DISCHARGE DISPOSITION (IF DISCHARGED): Non [facility] Acute Rehab     IMPORTANT INFORMATION:  Please contact the Katherine Matute directly with any questions or concerns regarding this request  Department voicemails are confidential     Send requests for admission clinical reviews, concurrent reviews, approvals, and administrative denials due to lack of clinical to fax [phone]

## 2022-01-22 NOTE — PROGRESS NOTES
Daily Progress Note - Critical Care   Kassi Pollard 80 y o  male MRN: [id number]  Unit/Bed#: ICU 01 Encounter: [id number]        ----------------------------------------------------------------------------------------  HPI/24hr events:   79 y/o male with hx of multiple falls and previously evacuated L SDH with bossman holes by Dr Bird Mauricio on 11/27/21 and bilateral MMA embolization by Dr Lizz Charles on 11/30/21  Presented from nursing facility with gradual functional decline including mental status, motor skills, and speech due to residual/reaccumulation of L SDH  Neurosurgery took the patient to the OR 1/21/21 for left craniotomy and evacuation of SDH     ---------------------------------------------------------------------------------------  SUBJECTIVE  No acute complaints, intermittently answers questions  Says "no" when asked if his is in any discomfort or has any complaints  Review of Systems   Unable to perform ROS: Mental status change     Review of systems was unable to be performed secondary to altered mental status  ---------------------------------------------------------------------------------------  Assessment and Plan:    Neuro:   · Diagnosis: Left subdural hematoma s/p L craniotomy and evacuation  ? Plan:  § Neurosurgery following, appreciate recommendations  § Goal SBP < 160 mm Hg  § Continue Keppra  § Monitor subdural drain and subgaleal drain output  § Post-op CT head showed decreased size of SDH  § AM CT head  § Neuro check Q1 hour  § CAM-ICU, delirium precautions  § PT/OP  § Speech/Swallow evaluation  · Diagnosis: Analgesia  ? Plan:   § Oxycodone 2 5 mg and 5 mg PRN for moderate/severe pain  § Dilaudid 0 2 mg for breakthrough pain        CV:   · Diagnosis: Essential HTN  ? Plan:  § Post-op goal SBP < 160  § Metoprolol 25 mg IV BID   · Diagnosis: Mixed HLD  ? Plan:      § Pravastatin 10 mg daily        Pulm:  · Diagnosis: No active issues  ?  Plan:   § Pulmonary hygiene  § Supplemental O2 as needed to maintain SpO2 > 92%        GI:   · Diagnosis: No active issues  ? Plan:   § Protonix 40 mg daily  § Zofran 4 mg Q6 hours PRN for nausea  § Bowel regimen - Dulcolax 10 mg daily PRN, Senokot 8 6 mg daily     :   · Diagnosis: Hx of CKD stage 2  ? Plan:   § Avoid nephrotoxic medications  § Monitor I/O and UOP  · Diagnosis: Hx of urinary retention  ? Plan:   § Maintain Marroquin catheter  § Monitor UOP  § Continue finasteride        F/E/N:   · Plan:   ? Fluids - NS @ 75 cc/hr  ? Electrolytes - Monitor and replete as needed  ? Nutrition - NPO for now, pending speech/swallow eval in AM        Heme/Onc:   · Diagnosis: No active issues  ? Plan:   § Monitor Hgb  § Transfuse for Hgb < 7        Endo:   · Diagnosis: No active issues  ? Plan:   § Monitor blood glucose  § Avoid hypoglycemia and hyperglycemia        ID:   · Diagnosis: Surgical prophylaxis  ? Plan:  § Ancef 1000 mg IV Q8 hours x 3 doses  § Monitor WBC and fever curve        MSK/Skin:   · Diagnosis: No active issues  ? Plan:   § Monitor surgical incisions and drains  § Local wound care  § PT/OT       Patient appropriate for transfer out of the ICU today?: No  Disposition: Continue Critical Care   Code Status: Level 1 - Full Code  ---------------------------------------------------------------------------------------  ICU CORE MEASURES    Prophylaxis   VTE Pharmacologic Prophylaxis: Pharmacologic VTE Prophylaxis contraindicated due to SDH, post-op  VTE Mechanical Prophylaxis: sequential compression device  Stress Ulcer Prophylaxis: Pantoprazole PO    ABCDE Protocol (if indicated)  Plan to perform spontaneous awakening trial today? Not applicable  Plan to perform spontaneous breathing trial today? Not applicable  Obvious barriers to extubation?  Not applicable  CAM-ICU: Confused at baseline 2/2 recurrent SDH    Invasive Devices Review  Invasive Devices  Report    Peripheral Intravenous Line            Peripheral IV 01/21/22 Right;Dorsal (posterior) Wrist <1 day Peripheral IV 22 Right;Dorsal (posterior) Wrist <1 day          Arterial Line            Arterial Line 22 Left Radial <1 day          Drain            Urethral Catheter 36 days    Urethral Catheter Latex 1 day    Closed/Suction Drain Left Head Bulb <1 day    Closed/Suction Drain Midline Head Bulb <1 day              Can any invasive devices be discontinued today? No  ---------------------------------------------------------------------------------------  OBJECTIVE    Vitals   Vitals:    22 2300 22 0000 22 0100 22 0200   BP: 118/66 114/64 (!) 105/49 119/55   Pulse: 90 98 74 78   Resp: (!) 27 (!) 34 17 (!) 26   Temp:       TempSrc:       SpO2: 98% 97% 97% 97%   Weight:       Height:         Temp (24hrs), Av 3 °F (36 8 °C), Min:96 6 °F (35 9 °C), Max:99 8 °F (37 7 °C)  Current: Temperature: 99 °F (37 2 °C)  HR: 80  BP: 119/55  RR: 26  SpO2: 97%    Respiratory:  SpO2: SpO2: 98 %, SpO2 Activity: SpO2 Activity: At Rest, SpO2 Device: O2 Device: None (Room air)       Invasive/non-invasive ventilation settings   Respiratory  Report   Lab Data (Last 4 hours)    None         O2/Vent Data (Last 4 hours)    None                Physical Exam  Vitals and nursing note reviewed  Constitutional:       Appearance: He is not toxic-appearing  Comments: Elderly male lying in bed, awake, appears pleasantly confused  Answer some questions and follow some commands, but not consistently  HENT:      Head:      Comments: S/P left craniotomy, midline and left sided drains in place, with a small amount of bloody fluid  Surgical sites appear c/d/i  Right Ear: External ear normal       Left Ear: External ear normal       Nose: Nose normal       Mouth/Throat:      Mouth: Mucous membranes are moist       Pharynx: Oropharynx is clear  Eyes:      Extraocular Movements: Extraocular movements intact        Conjunctiva/sclera: Conjunctivae normal       Pupils: Pupils are equal, round, and reactive to light  Cardiovascular:      Rate and Rhythm: Normal rate and regular rhythm  Pulses: Normal pulses  Heart sounds: Normal heart sounds  Pulmonary:      Effort: Pulmonary effort is normal  No respiratory distress  Breath sounds: Normal breath sounds  No wheezing  Abdominal:      General: Abdomen is flat  Bowel sounds are normal  There is no distension  Palpations: Abdomen is soft  Tenderness: There is no abdominal tenderness  There is no guarding  Genitourinary:     Comments: Marroquin in place  Musculoskeletal:         General: No swelling or tenderness  Normal range of motion  Cervical back: Normal range of motion and neck supple  Right lower leg: No edema  Left lower leg: No edema  Skin:     General: Skin is warm and dry  Capillary Refill: Capillary refill takes less than 2 seconds  Neurological:      Comments: Awake, GCS 14  Soft mumbled speech, answers some questions and follow some commands  Moves all extremities spontaneously, strength and sensation grossly intact and symmetric  Laboratory and Diagnostics:        Invalid input(s):  EOSPCT  Results from last 7 days   Lab Units 01/21/22  2232   SODIUM mmol/L 139   POTASSIUM mmol/L 3 9   CHLORIDE mmol/L 110*   CO2 mmol/L 26   ANION GAP mmol/L 3*   BUN mg/dL 10   CREATININE mg/dL 0 73   CALCIUM mg/dL 8 5   GLUCOSE RANDOM mg/dL 84                       ABG:    VBG:          Micro        EKG: Reviewed telemetry  Imaging:  I have personally reviewed pertinent reports     and I have personally reviewed pertinent films in PACS    Intake and Output  I/O       01/20 0701 01/21 0700 01/21 0701 01/22 0700    I V  (mL/kg)  3400 (50 5)    IV Piggyback  700    Total Intake(mL/kg)  4100 (60 9)    Urine (mL/kg/hr)  1260 (0 8)    Drains  385    Blood  350    Total Output  1995    Net  +2105              UOP: 0 8 ml/kg/hr     Height and Weights   Height: 5' 9" (175 3 cm)  IBW (Ideal Body Weight): 70 7 kg  Body mass index is 21 91 kg/m²  Weight (last 2 days)     Date/Time Weight    01/21/22 1653 67 3 (148 37)    01/21/22 0554 67 (147 8)            Nutrition       Diet Orders   (From admission, onward)             Start     Ordered    01/21/22 1746  Diet NPO  Diet effective now        References:    Nutrtion Support Algorithm Enteral vs  Parenteral   Question Answer Comment   Diet Type NPO    RD to adjust diet per protocol?  Yes        01/21/22 1746                  Active Medications  Scheduled Meds:  Current Facility-Administered Medications   Medication Dose Route Frequency Provider Last Rate    ammonium lactate   Topical BID Shayla Barrios PA-C      bisacodyl  10 mg Rectal Daily PRN Shayla Barrios PA-C      cefazolin  1,000 mg Intravenous Q8H Maggie Barrios PA-C Stopped (01/22/22 0200)    escitalopram  10 mg Oral Daily Maggie Barrios PA-C      finasteride  5 mg Oral Daily Maggie Barrios PA-C      HYDROmorphone  0 2 mg Intravenous Q4H PRN Obinna Malhotra PA-C      levETIRAcetam  500 mg Intravenous Q12H [facility] Stopped (01/21/22 2117)    metoprolol tartrate  25 mg Oral Q12H [facility] Maggie Barrios PA-C      ondansetron  4 mg Intravenous Q6H PRN Shayla Barrios PA-C      oxyCODONE  2 5 mg Oral Q4H PRN Obinna Malhotra PA-C      oxyCODONE  5 mg Oral Q4H PRN Shayla Barrios PA-C      pantoprazole  40 mg Oral Early Morning Maggie Barrios PA-C      potassium chloride  10 mEq Oral Daily Maggie Barrios PA-C      pravastatin  10 mg Oral Daily Maggie Barrios PA-C      risperiDONE  0 25 mg Oral BID Maggie Barrios PA-C      risperiDONE  2 mg Oral HS Maggie Barrios PA-C      senna  1 tablet Oral Daily Maggie Barrios PA-C      sodium chloride  75 mL/hr Intravenous Continuous BRAULIO CuiC 75 mL/hr (01/22/22 0044)     Continuous Infusions:  sodium chloride, 75 mL/hr, Last Rate: 75 mL/hr (01/22/22 0044)      PRN Meds:   bisacodyl, 10 mg, Daily PRN  HYDROmorphone, 0 2 mg, Q4H PRN  ondansetron, 4 mg, Q6H PRN  oxyCODONE, 2 5 mg, Q4H PRN  oxyCODONE, 5 mg, Q4H PRN        Allergies   No Known Allergies  ---------------------------------------------------------------------------------------  Advance Directive and Living Will:      Power of :    POLST:    ---------------------------------------------------------------------------------------  Care Time Delivered:   Upon my evaluation, this patient had a high probability of imminent or life-threatening deterioration due to SDH, s/p craniotomy and evacuation, which required my direct attention, intervention, and personal management  I have personally provided 35 minutes of critical care time, exclusive of procedures, teaching, family meetings, and any prior time recorded by providers other than myself  Nii Mcintyre MD      Portions of the record may have been created with voice recognition software  Occasional wrong word or "sound a like" substitutions may have occurred due to the inherent limitations of voice recognition software    Read the chart carefully and recognize, using context, where substitutions have occurred

## 2022-01-22 NOTE — UTILIZATION REVIEW
Initial Clinical Review    Elective inpatient surgical procedure  Age/Sex: 80 y o  male  Surgery Date: 1/21/22  Procedure: Left sided craniotomy for evacuation subdural hematoma (Left)  Anesthesia: general  Operative Findings:   Left subdural hygroma  Membrane formation with very little old residual subdural  No acute hemorrhage noted    POD#1 Progress Note:   Remains on Keppra for seizure prophy, frequent neuro checks  Drain output (total 100/40 and 165/150), keep both in place today   Postop CTH showed decreased size of SDH, repeat CTH overnight stable    Neurologic exam  Alert, oriented to self only, conversant, mumbles  PERRL, EOMI  Face symmetric  5/5 in all extremities, following commands briskly  Sensation intact throughout  Dressing CDI  Drains in place    Admission Orders: Date/Time/Statement:   Admission Orders (From admission, onward)     Ordered        01/21/22 1030  Inpatient Admission  Once                      Orders Placed This Encounter   Procedures    Inpatient Admission     Standing Status:   Standing     Number of Occurrences:   1     Order Specific Question:   Level of Care     Answer:   Critical Care [15]     Order Specific Question:   Estimated length of stay     Answer:   Inpatient Only Surgery     Vital Signs: /56   Pulse 78   Temp 98 1 °F (36 7 °C)   Resp 15   Ht 5' 9" (1 753 m)   Wt 45 9 kg (101 lb 3 1 oz)   SpO2 96%   BMI 14 94 kg/m²     Pertinent Labs/Diagnostic Test Results:   Results from last 7 days   Lab Units 01/22/22  0548   WBC Thousand/uL 8 85   HEMOGLOBIN g/dL 8 0*   HEMATOCRIT % 25 5*   PLATELETS Thousands/uL 315     Results from last 7 days   Lab Units 01/22/22  0555 01/21/22  2232   SODIUM mmol/L 139 139   POTASSIUM mmol/L 3 7 3 9   CHLORIDE mmol/L 109* 110*   CO2 mmol/L 25 26   ANION GAP mmol/L 5 3*   BUN mg/dL 8 10   CREATININE mg/dL 0 64 0 73   EGFR ml/min/1 73sq m 93 88   CALCIUM mg/dL 8 3 8 5     Results from last 7 days   Lab Units 01/21/22  1047   POC GLUCOSE mg/dl 110     Results from last 7 days   Lab Units 01/22/22  0555 01/21/22  2232   GLUCOSE RANDOM mg/dL 76 84     Results from last 7 days   Lab Units 01/22/22  0548   PROTIME seconds 15 4*   INR  1 27*   PTT seconds 38*     1/22  CT head=  Redemonstrated residual left and thin right subdural collections not significantly changed compared to prior study dated 1/21/2022  No significant midline shift  Diet: NPO  Mobility: OOB  DVT Prophylaxis: scd  Wound care: 1) Check operative dressing with vital signs  2) Change dressing on POD # 2 and PRN when saturated  3) Replace steri-strips PRN  Wound drain: 1) Empty and measure drainage every shift and PRN   2) Call physician if drainage is greater than 200 mL/8 hours  Marroquin cath  Seizure precautions  Neuro checks q4h  Pt/ot/st eval & tx    Medications/Pain Control:   Scheduled Medications:  ammonium lactate, , Topical, BID  escitalopram, 10 mg, Oral, Daily  finasteride, 5 mg, Oral, Daily  levETIRAcetam, 500 mg, Intravenous, Q12H AYSHA  metoprolol tartrate, 25 mg, Oral, Q12H AYSHA  pantoprazole, 40 mg, Oral, Early Morning  potassium chloride, 10 mEq, Oral, Daily  pravastatin, 10 mg, Oral, Daily  risperiDONE, 0 25 mg, Oral, BID  risperiDONE, 2 mg, Oral, HS  senna, 1 tablet, Oral, Daily    Continuous IV Infusions:  sodium chloride, 75 mL/hr, Intravenous, Continuous    PRN Meds:  bisacodyl, 10 mg, Rectal, Daily PRN  HYDROmorphone, 0 2 mg, Intravenous, Q4H PRN  ondansetron, 4 mg, Intravenous, Q6H PRN  oxyCODONE, 2 5 mg, Oral, Q4H PRN  oxyCODONE, 5 mg, Oral, Q4H PRN    Network Utilization Review Department  ATTENTION: Please call with any questions or concerns to [phone] and carefully listen to the prompts so that you are directed to the right person   All voicemails are confidential   Emi Grand all requests for admission clinical reviews, approved or denied determinations and any other requests to dedicated fax number below belonging to the campus where the patient is receiving treatment   List of dedicated fax numbers for the Facilities:  [address] [place] [address] DENIALS (Administrative/Medical Necessity) [phone]   [address] (Maternity/NICU/Pediatrics) [phone]   [address] [place] [address]  [address] [facility] [place] [address] [place] [address] [address] [facility] [place] [address] [phone]

## 2022-01-23 NOTE — PROGRESS NOTES
Neurosurgery progress note      Overnight events  No acute events overnight  Neurologically stable, more interactive and talkative this morning       Vitals:    01/23/22 0300 01/23/22 0400 01/23/22 0500 01/23/22 0600   BP: 139/63 120/69 112/52 112/55   BP Location:  Left arm     Pulse: 78 84 80 72   Resp: 16 (!) 25 22 21   Temp:  97 6 °F (36 4 °C)     TempSrc:  Oral     SpO2: 91% 100% 100% 100%   Weight:    46 kg (101 lb 6 6 oz)   Height:           Lab Results   Component Value Date/Time    SODIUM 136 01/23/2022 05:01 AM    K 3 5 01/23/2022 05:01 AM    WBC 10 81 (H) 01/23/2022 05:01 AM    HGB 9 0 (L) 01/23/2022 05:01 AM    HCT 28 9 (L) 01/23/2022 05:01 AM     01/23/2022 05:01 AM    PTT 38 (H) 01/22/2022 05:48 AM    INR 1 27 (H) 01/22/2022 05:48 AM         Intake/Output Summary (Last 24 hours) at 1/23/2022 0744  Last data filed at 1/23/2022 0600  Gross per 24 hour   Intake 2050 ml   Output 1515 ml   Net 535 ml         Current Facility-Administered Medications:     ammonium lactate (LAC-HYDRIN) 12 % lotion, , Topical, BID, Maggie Barrios PA-C, Given at 01/22/22 1732    bisacodyl (DULCOLAX) rectal suppository 10 mg, 10 mg, Rectal, Daily PRN, Maggie Barrios PA-C    escitalopram (LEXAPRO) tablet 10 mg, 10 mg, Oral, Daily, Maggie Barrios PA-C    finasteride (PROSCAR) tablet 5 mg, 5 mg, Oral, Daily, Maggie Barrios PA-C    HYDROmorphone HCl (DILAUDID) injection 0 2 mg, 0 2 mg, Intravenous, Q4H PRN, Maggie Barrios PA-C, 0 2 mg at 01/22/22 1626    levETIRAcetam (KEPPRA) 500 mg in sodium chloride 0 9 % 100 mL IVPB, 500 mg, Intravenous, Q12H [facility] PA-C, Last Rate: 400 mL/hr at 01/23/22 0004, 500 mg at 01/23/22 0004    metoprolol (LOPRESSOR) injection 5 mg, 5 mg, Intravenous, Q6H, Izabel Santos PA-C, 5 mg at 01/23/22 0501    ondansetron (ZOFRAN) injection 4 mg, 4 mg, Intravenous, Q6H PRN, Maggie Barrios PA-C    oxyCODONE (ROXICODONE) IR tablet 2 5 mg, 2 5 mg, Oral, Q4H PRN, Eboni Barrios PA-C    oxyCODONE (ROXICODONE) IR tablet 5 mg, 5 mg, Oral, Q4H PRN, Maggie Barrios, PA-C    pantoprazole (PROTONIX) injection 40 mg, 40 mg, Intravenous, Daily, Isaiah Chapman PA-C, 40 mg at 01/22/22 0957    potassium chloride (K-DUR,KLOR-CON) CR tablet 10 mEq, 10 mEq, Oral, Daily, Maggie Barrios, PA-C    pravastatin (PRAVACHOL) tablet 10 mg, 10 mg, Oral, Daily, Maggie Barrios, PA-C    risperiDONE (RisperDAL) tablet 0 25 mg, 0 25 mg, Oral, BID, Maggie Barrios, PA-C    risperiDONE (RisperDAL) tablet 2 mg, 2 mg, Oral, HS, Maggie Barrios PA-C    senna (SENOKOT) tablet 8 6 mg, 1 tablet, Oral, Daily, Maggie Barrios, PA-C    sodium chloride 0 9 % infusion, 75 mL/hr, Intravenous, Continuous, Maggie Barrios PA-C, Last Rate: 75 mL/hr at 01/22/22 1330, 75 mL/hr at 01/22/22 1330     Neurologic exam  Alert, oriented to self only, conversant, mumbles  PERRL, EOMI  Face symmetric  5/5 in all extremities, following commands briskly  Sensation intact throughout  Incision  CDI    Assessment  Patient is a 81-year-old male with h/o multiple falls and previously evacuated L SDH with bossman holes by Dr Angela Mejia on 11/27/21 and bilateral MMA embolization by Dr Benna Lefort on 11/30/21 who p/w gradual functional decline including mental status, motor skills, and speech due to residual/reaccumulation of L SDH, now s/p left-sided craniotomy for evacuation subdural hematoma by Dr Angela Mejia on 1/21/22  Plan  -Neurologically stable  -SBP<160  -Continue Keppra  -Drain output both < 30 cc in last shift, both removed this morning without complication   -Postop CTH showed decreased size of SDH, repeat CTH on 1/22 stable; MRI pending   -PT/OT pending   -Okay to start SQH BID tonight for DVT ppx   -Transfer out of ICU per primary team (Trauma)  -NSGY will sign off at this time, please page with any questions or concerns      Junaid MCWILLIAMS    Neurosurgeon

## 2022-01-23 NOTE — SPEECH THERAPY NOTE
Speech/Language Pathology Progress Note    Patient Name: Abram aRmirez  AONUI'Z Date: 1/23/2022     Subjective:  Pt more alert today with improved responsiveness  Drains removed  Speech is still slightly garbled but more appropriate and mostly intelligible  Objective:  Pt seen for dysphagia therapy  He was kept NPO following assessment yesterday  Today he was offered trials of thin liquid, nectar thick liquid, and puree  Initial trial of thin liquid resulted in delayed/productive cough (? Mucous, unable to expectorate) and ~1 min stridor with breathing  Once stridor cleared, few additional small sips were tolerted without s/s aspiration however cough occurred on 1/4 (final trial), switched to nectar thick liquids  All nectar thick liquid trials tolerated without s/s aspiration, majority via straw  Pt ate a total of 2 oz apple sauce and 4 oz chocolate pudding  Slight fatigue noted with progression of intake with slowed transfer and slight increase in swallow delay, however overall functional tolerance of puree  Mild throat clearing with pudding x1  Assessment:  Much improved PO tolerance today with no bolus holding or expectoration as seen during evaluation yesterday  Plan/Recommendations:  Initiate puree diet with nectar thick liquids  Full feed  Meds as tolerated  Aspiration precautions sheet posted in room

## 2022-01-23 NOTE — PROGRESS NOTES
Daily Progress Note - Critical Care   Cheyenne Rmaon 80 y o  male MRN: [id number]  Unit/Bed#: ICU 01 Encounter: [id number]    ----------------------------------------------------------------------------------------  HPI/24hr events:   81 y/o male with hx of multiple falls and previously evacuated L SDH with bossman holes by Dr Neri Rosenbaum on 11/27/21 and bilateral MMA embolization by Dr Jose G Hooper on 11/30/21  Presented from nursing facility with gradual functional decline including mental status, motor skills, and speech due to residual/reaccumulation of L SDH  Neurosurgery took the patient to the OR 1/21/21 for left craniotomy and evacuation of SDH     ---------------------------------------------------------------------------------------  SUBJECTIVE  No acute events  Patient unable to offer any new complaints 2/2 mental status  Review of Systems   Unable to perform ROS: Mental status change     Review of systems was unable to be performed secondary to altered mental status  ---------------------------------------------------------------------------------------  Assessment and Plan:    Neuro:   · Diagnosis: Left subdural hematoma s/p L craniotomy and evacuation  ? Plan:  § Neurosurgery following, appreciate recommendations  § Goal SBP < 160 mm Hg  § Continue Keppra  § Monitor subdural drain and subgaleal drain output - management per Neurosurgery  § Post-op CT head showed decreased size of SDH  § F/u MRI brain, per neurosurgery  § Neuro check Q4 hour  § CAM-ICU, delirium precautions  § PT/OT  § Speech/Swallow evaluation - maintain NPO pending further work with Speech therapy    · Diagnosis: Analgesia  ? Plan:   § Oxycodone 2 5 mg and 5 mg PRN for moderate/severe pain  § Dilaudid 0 2 mg for breakthrough pain     CV:   · Diagnosis: Essential HTN  ? Plan:  § Post-op goal SBP < 160  § Metoprolol 25 mg IV BID     · Diagnosis: Mixed HLD  ? Plan:      § Pravastatin 10 mg daily     Pulm:  · Diagnosis: No active issues  ?  Plan: § Pulmonary hygiene  § Supplemental O2 as needed to maintain SpO2 > 92%        GI:   · Diagnosis: No active issues  ? Plan:   § Protonix 40 mg daily  § Zofran 4 mg Q6 hours PRN for nausea  § Bowel regimen - Dulcolax 10 mg daily PRN, Senokot 8 6 mg daily     :   · Diagnosis: Hx of CKD stage 2  ? Plan:   § Avoid nephrotoxic medications  § Monitor I/O and UOP    · Diagnosis: Hx of urinary retention  ? Plan:   § Maintain Marroquin catheter  § Monitor UOP  § Continue finasteride     F/E/N:   · Plan:   ? Fluids - NS @ 75 cc/hr  ? Electrolytes - Monitor and replete as needed  ? Nutrition - NPO for now, pending further progress with speech therapy        Heme/Onc:   · Diagnosis: No active issues  ? Plan:   § Monitor Hgb  § Transfuse for Hgb < 7        Endo:   · Diagnosis: No active issues  ? Plan:   § Monitor blood glucose  § Avoid hypoglycemia and hyperglycemia        ID:   · Diagnosis: Surgical prophylaxis  ? Plan:  § Ancef 1000 mg IV Q8 hours x 3 doses (completed)  § Monitor WBC and fever curve        MSK/Skin:   · Diagnosis: No active issues  ? Plan:   § Monitor surgical incisions and drains  § Local wound care  § PT/OT       Patient appropriate for transfer out of the ICU today?: No  Disposition: Transfer to floor  Code Status: Level 1 - Full Code  ---------------------------------------------------------------------------------------  ICU CORE MEASURES    Prophylaxis   VTE Pharmacologic Prophylaxis: Pharmacologic VTE Prophylaxis contraindicated due to SDH, post-op  VTE Mechanical Prophylaxis: sequential compression device  Stress Ulcer Prophylaxis: Pantoprazole PO    ABCDE Protocol (if indicated)  Plan to perform spontaneous awakening trial today? Not applicable  Plan to perform spontaneous breathing trial today? Not applicable  Obvious barriers to extubation?  Not applicable  CAM-ICU: Negative    Invasive Devices Review  Invasive Devices  Report    Peripheral Intravenous Line            Peripheral IV 01/21/22 Right;Dorsal (posterior) Wrist 1 day    Peripheral IV 22 Right;Dorsal (posterior) Wrist 1 day          Arterial Line            Arterial Line 22 Left Radial 1 day          Drain            Urethral Catheter 37 days    Urethral Catheter Latex 2 days    Closed/Suction Drain Left Head Bulb 1 day    Closed/Suction Drain Midline Head Bulb 1 day              Can any invasive devices be discontinued today? No  ---------------------------------------------------------------------------------------  OBJECTIVE    Vitals   Vitals:    22 0100 22 0200 22 0300 22 0400   BP: 118/59 135/57 139/63 120/69   BP Location:    Left arm   Pulse: 74 74 78 84   Resp: (!) 23 (!) 25 16 (!) 25   Temp:       TempSrc:    Oral   SpO2: 99% 99% 91% 100%   Weight:       Height:         Temp (24hrs), Av 1 °F (36 7 °C), Min:97 9 °F (36 6 °C), Max:98 7 °F (37 1 °C)  Current: Temperature: 98 °F (36 7 °C)    Respiratory:  SpO2: SpO2: 100 %, SpO2 Activity: SpO2 Activity: At Rest, SpO2 Device: O2 Device: None (Room air)       Invasive/non-invasive ventilation settings   Respiratory  Report   Lab Data (Last 4 hours)    None         O2/Vent Data (Last 4 hours)    None                Physical Exam   GEN: NAD  HEENT: x2 subdural drains w/ serosanguinous drainage  CV: warm/well perfused  Lung: normal effort  Ab: Soft, NT/ND  Extrem: No CCE  Neuro: Awake / alert  Follows commands but answers questions incomprehensibly  Moving all four extremities equally      Laboratory and Diagnostics:  Results from last 7 days   Lab Units 22  0548   WBC Thousand/uL 8 85   HEMOGLOBIN g/dL 8 0*   HEMATOCRIT % 25 5*   PLATELETS Thousands/uL 315     Results from last 7 days   Lab Units 22  0555 22  2232   SODIUM mmol/L 139 139   POTASSIUM mmol/L 3 7 3 9   CHLORIDE mmol/L 109* 110*   CO2 mmol/L 25 26   ANION GAP mmol/L 5 3*   BUN mg/dL 8 10   CREATININE mg/dL 0 64 0 73   CALCIUM mg/dL 8 3 8 5   GLUCOSE RANDOM mg/dL 76 84 Results from last 7 days   Lab Units 01/22/22  0548   INR  1 27*   PTT seconds 38*              ABG:    VBG:          Micro        EKG: Reviewed telemetry  Imaging:  I have personally reviewed pertinent reports  and I have personally reviewed pertinent films in PACS    Intake and Output  I/O       01/21 0701  01/22 0700 01/22 0701  01/23 0700    I V  (mL/kg) 3700 (80 6) 1800 (39 1)    IV Piggyback 700 250    Total Intake(mL/kg) 4400 (95 9) 2050 (44 6)    Urine (mL/kg/hr) 1560 (1 4) 1440 (1 3)    Drains 465 75    Blood 350     Total Output 2375 1515    Net +2025 +535                Height and Weights   Height: 5' 9" (175 3 cm)  IBW (Ideal Body Weight): 70 7 kg  Body mass index is 14 94 kg/m²  Weight (last 2 days)     Date/Time Weight    01/22/22 0600 45 9 (101 19)    01/21/22 1653 67 3 (148 37)    01/21/22 0554 67 (147 8)            Nutrition       Diet Orders   (From admission, onward)             Start     Ordered    01/21/22 1746  Diet NPO  Diet effective now        References:    Nutrtion Support Algorithm Enteral vs  Parenteral   Question Answer Comment   Diet Type NPO    RD to adjust diet per protocol?  Yes        01/21/22 1746                  Active Medications  Scheduled Meds:  Current Facility-Administered Medications   Medication Dose Route Frequency Provider Last Rate    ammonium lactate   Topical BID Maggie Barrios PA-C      bisacodyl  10 mg Rectal Daily PRN Luis Barrios PA-C      escitalopram  10 mg Oral Daily Maggie Barrios PA-C      finasteride  5 mg Oral Daily Maggie Barrios PA-C      HYDROmorphone  0 2 mg Intravenous Q4H PRN Lo Bundy PA-C      levETIRAcetam  500 mg Intravenous Q12H [address] Candy Flores PA-C 500 mg (01/23/22 0004)    metoprolol  5 mg Intravenous Q6H Shonda Powers PA-C      ondansetron  4 mg Intravenous Q6H PRN Luis Barrios PA-C      oxyCODONE  2 5 mg Oral Q4H PRN LUPE Anderson-C      oxyCODONE  5 mg Oral Q4H PRN Maryann Nj LEATHA Barrios, YUE      pantoprazole  40 mg Intravenous Daily Ivania CampaYUE      potassium chloride  10 mEq Oral Daily Maggie LEATHA Barrios, YUE      pravastatin  10 mg Oral Daily Maggie LEATHA Barrios PA-C      risperiDONE  0 25 mg Oral BID Maggie N Sophie, PA-RIDDHI      risperiDONE  2 mg Oral HS Maggie LEATHA Barrios, YUE      senna  1 tablet Oral Daily Maggie LEATHA Barrios, YUE      sodium chloride  75 mL/hr Intravenous Continuous Maggie LEATHA Barrios, YUE 75 mL/hr (01/22/22 1330)     Continuous Infusions:  sodium chloride, 75 mL/hr, Last Rate: 75 mL/hr (01/22/22 1330)      PRN Meds:   bisacodyl, 10 mg, Daily PRN  HYDROmorphone, 0 2 mg, Q4H PRN  ondansetron, 4 mg, Q6H PRN  oxyCODONE, 2 5 mg, Q4H PRN  oxyCODONE, 5 mg, Q4H PRN        Allergies   No Known Allergies  ---------------------------------------------------------------------------------------  Advance Directive and Living Will:      Power of :    POLST:    ---------------------------------------------------------------------------------------  Care Time Delivered:     Vernie Simmonds, MD      Portions of the record may have been created with voice recognition software  Occasional wrong word or "sound a like" substitutions may have occurred due to the inherent limitations of voice recognition software    Read the chart carefully and recognize, using context, where substitutions have occurred

## 2022-01-23 NOTE — PROGRESS NOTES
Progress Note - ICU Transfer to Step down/med  surg  - Anai Valenzuela 80 y o  male MRN: [id number]    Unit/Bed#: ICU 01 Encounter: [id number]      Code Status: Level 1 - Full Code    Date of ICU admission: 1/21/2022    Reason for ICU adm: left subdural hematoma s/p left craniotomy and evacuation    Active problems:   Patient Active Problem List   Diagnosis    Stage 2 chronic kidney disease    Mixed hyperlipidemia    Essential hypertension    Depressive disorder    Subdural hematoma (HCC)    Encephalopathy acute    Urinary retention    Multiple falls    COVID-19    UTI (urinary tract infection)       Summary of clinical course:   HPI Per   [facility]:  "Anai Valenzuela is a 80 y o  male with hx of multiple falls and previously evacuated L SDH with bossman holes by Dr Lety Bullock on 11/27/21 and bilateral MMA embolization by Dr Andrew Lynch on 11/30/21  Presented from nursing facility with gradual functional decline including mental status, motor skills, and speech due to residual/reaccumulation of L SDH  Neurosurgery took the patient to the OR 1/21/21 for left craniotomy and evacuation of SDH "     Patient was admitted to the ICU on 1/21/2022 s/p left craniotomy and evacuation of SDH  He had two drains in place (subdural and subgaleal) which have since been d/c by neurosurgery, and keppra has been continued  His post-op CT revealed decreased size of SDH  He was evaluated by speech who recommended maintaining NPO status and further speech evaluations to reassess (per their report dysphagia likely 2/2 AMS at the time)  On 1/23 he was reevaluated and cleared for initiation of puree diet with nectar thick liquids, full feed, meds as tolerated, with aspiration precautions  Neurosurgery also evaluated the patient on 1/23/2022 and recommended brain MRI and deemed stable for downgrading/transfer out of the ICU  PT and OT evaluations are pending and ordered   DVT ppx was held in the setting of SDH, however, neurosurgery cleared the patient for resumption of SQH BID starting at 21:00 today 1/23       Recent or scheduled procedures:   1/21/22 - left craniotomy for evacuation of subdural hematoma with 2 drains placed  1/23/22 - bedside drain removal x 2      Outstanding/pending diagnostics:   - MRI brain ordered and pending  - Okay to resume SQH 21:00 on 1/23/22 per neurosurgery    Hospital discharge planning:  CM consulted for post acute needs, PT/OT evals pending

## 2022-01-23 NOTE — MALNUTRITION/BMI
This medical record reflects one or more clinical indicators suggestive of malnutrition and/or morbid obesity  Malnutrition Findings:   Adult Malnutrition type: Chronic illness  Adult Degree of Malnutrition: Malnutrition of moderate degree (related to illness as evidenced by reported 22% wt loss x6 months, <75% energy intake compared to estimated energy needs for >1 month  treatment= diet education and supplements)  Malnutrition Characteristics: Weight loss    BMI Findings: Body mass index is 14 98 kg/m²  See Nutrition note dated 1/23/22 for additional details  Completed nutrition assessment is viewable in the nutrition documentation

## 2022-01-24 PROBLEM — E44.0 MODERATE PROTEIN-CALORIE MALNUTRITION (HCC): Status: ACTIVE | Noted: 2022-01-01

## 2022-01-24 NOTE — PLAN OF CARE
Problem: Prexisting or High Potential for Compromised Skin Integrity  Goal: Skin integrity is maintained or improved  Description: INTERVENTIONS:  - Identify patients at risk for skin breakdown  - Assess and monitor skin integrity  - Assess and monitor nutrition and hydration status  - Monitor labs   - Assess for incontinence   - Turn and reposition patient  - Assist with mobility/ambulation  - Relieve pressure over bony prominences  - Avoid friction and shearing  - Provide appropriate hygiene as needed including keeping skin clean and dry  - Evaluate need for skin moisturizer/barrier cream  - Collaborate with interdisciplinary team   - Patient/family teaching  - Consider wound care consult   Outcome: Progressing     Problem: MOBILITY - ADULT  Goal: Maintain or return to baseline ADL function  Description: INTERVENTIONS:  -  Assess patient's ability to carry out ADLs; assess patient's baseline for ADL function and identify physical deficits which impact ability to perform ADLs (bathing, care of mouth/teeth, toileting, grooming, dressing, etc )  - Assess/evaluate cause of self-care deficits   - Assess range of motion  - Assess patient's mobility; develop plan if impaired  - Assess patient's need for assistive devices and provide as appropriate  - Encourage maximum independence but intervene and supervise when necessary  - Involve family in performance of ADLs  - Assess for home care needs following discharge   - Consider OT consult to assist with ADL evaluation and planning for discharge  - Provide patient education as appropriate  Outcome: Progressing  Goal: Maintains/Returns to pre admission functional level  Description: INTERVENTIONS:  - Perform BMAT or MOVE assessment daily    - Set and communicate daily mobility goal to care team and patient/family/caregiver  - Collaborate with rehabilitation services on mobility goals if consulted  - Perform Range of Motion  times a day    - Reposition patient every hours   - Dangle patient  times a day  - Stand patient  times a day  - Ambulate patient  times a day  - Out of bed to chair  times a day   - Out of bed for meals  times a day  - Out of bed for toileting  - Record patient progress and toleration of activity level   Outcome: Progressing     Problem: Potential for Falls  Goal: Patient will remain free of falls  Description: INTERVENTIONS:  - Educate patient/family on patient safety including physical limitations  - Instruct patient to call for assistance with activity   - Consult OT/PT to assist with strengthening/mobility   - Keep Call bell within reach  - Keep bed low and locked with side rails adjusted as appropriate  - Keep care items and personal belongings within reach  - Initiate and maintain comfort rounds  - Make Fall Risk Sign visible to staff  - Offer Toileting every  Hours, in advance of need  - Initiate/Maintain alarm  - Obtain necessary fall risk management equipme  - Apply yellow socks and bracelet for high fall risk patients  - Consider moving patient to room near nurses station  Outcome: Progressing     Problem: Nutrition/Hydration-ADULT  Goal: Nutrient/Hydration intake appropriate for improving, restoring or maintaining nutritional needs  Description: Monitor and assess patient's nutrition/hydration status for malnutrition  Collaborate with interdisciplinary team and initiate plan and interventions as ordered  Monitor patient's weight and dietary intake as ordered or per policy  Utilize nutrition screening tool and intervene as necessary  Determine patient's food preferences and provide high-protein, high-caloric foods as appropriate       INTERVENTIONS:  - Monitor oral intake, urinary output, labs, and treatment plans  - Assess nutrition and hydration status and recommend course of action  - Evaluate amount of meals eaten  - Assist patient with eating if necessary   - Allow adequate time for meals  - Recommend/ encourage appropriate diets, oral nutritional supplements, and vitamin/mineral supplements  - Order, calculate, and assess calorie counts as needed  - Recommend, monitor, and adjust tube feedings and TPN/PPN based on assessed needs  - Assess need for intravenous fluids  - Provide specific nutrition/hydration education as appropriate  - Include patient/family/caregiver in decisions related to nutrition  Outcome: Progressing     Problem: NEUROSENSORY - ADULT  Goal: Achieves stable or improved neurological status  Description: INTERVENTIONS  - Monitor and report changes in neurological status  - Monitor vital signs such as temperature, blood pressure, glucose, and any other labs ordered   - Initiate measures to prevent increased intracranial pressure  - Monitor for seizure activity and implement precautions if appropriate      Outcome: Progressing  Goal: Remains free of injury related to seizures activity  Description: INTERVENTIONS  - Maintain airway, patient safety  and administer oxygen as ordered  - Monitor patient for seizure activity, document and report duration and description of seizure to physician/advanced practitioner  - If seizure occurs,  ensure patient safety during seizure  - Reorient patient post seizure  - Seizure pads on all 4 side rails  - Instruct patient/family to notify RN of any seizure activity including if an aura is experienced  - Instruct patient/family to call for assistance with activity based on nursing assessment  - Administer anti-seizure medications if ordered    Outcome: Progressing  Goal: Achieves maximal functionality and self care  Description: INTERVENTIONS  - Monitor swallowing and airway patency with patient fatigue and changes in neurological status  - Encourage and assist patient to increase activity and self care     - Encourage visually impaired, hearing impaired and aphasic patients to use assistive/communication devices  Outcome: Progressing     Problem: RESPIRATORY - ADULT  Goal: Achieves optimal ventilation and oxygenation  Description: INTERVENTIONS:  - Assess for changes in respiratory status  - Assess for changes in mentation and behavior  - Position to facilitate oxygenation and minimize respiratory effort  - Oxygen administered by appropriate delivery if ordered  - Initiate smoking cessation education as indicated  - Encourage broncho-pulmonary hygiene including cough, deep breathe, Incentive Spirometry  - Assess the need for suctioning and aspirate as needed  - Assess and instruct to report SOB or any respiratory difficulty  - Respiratory Therapy support as indicated  Outcome: Progressing     Problem: SAFETY,RESTRAINT: NV/NON-SELF DESTRUCTIVE BEHAVIOR  Goal: Remains free of harm/injury (restraint for non violent/non self-detsructive behavior)  Description: INTERVENTIONS:  - Instruct patient/family regarding restraint use   - Assess and monitor physiologic and psychological status   - Provide interventions and comfort measures to meet assessed patient needs   - Identify and implement measures to help patient regain control  - Assess readiness for release of restraint   Outcome: Progressing  Goal: Returns to optimal restraint-free functioning  Description: INTERVENTIONS:  - Assess the patient's behavior and symptoms that indicate continued need for restraint  - Identify and implement measures to help patient regain control  - Assess readiness for release of restraint   Outcome: Progressing     Problem: BEHAVIOR  Goal: Pt/Family maintain appropriate behavior and adhere to behavioral management agreement, if implemented  Description: INTERVENTIONS:  - Assess the family dynamic   - Encourage verbalization of thoughts and concerns in a socially appropriate manner  - Assess patient/family's coping skills and non-compliant behavior (including use of illegal substances)    - Utilize positive, consistent limit setting strategies supporting safety of patient, staff and others  - Initiate consult with Case Management, Spiritual Care or other ancillary services as appropriate  - If a patient's/visitor's behavior jeopardizes the safety of the patient, staff, or others, refer to organization procedure     - Notify Security of behavior or suspected illegal substances which indicate the need for search of the patient and/or belongings  - Encourage participation in the decision making process about a behavioral management agreement; implement if patient meets criteria  Outcome: Progressing

## 2022-01-24 NOTE — ASSESSMENT & PLAN NOTE
· Patient presented with catheter on admission  · Patient had urinary retention during previous hospitalization, discharged 12/20/2021  He was supposed to undergo a voiding trial at rehab and ultimately follow-up with Urology  No follow-up noted  · Will plan to discontinue catheter today for a voiding trial   · Continue home Proscar  · Condom catheter as needed    · Urinary retention protocol

## 2022-01-24 NOTE — CONSULTS
Consultation - Palliative and Supportive Care   Efren Mathias 80 y o  male [id number]    Patient Active Problem List   Diagnosis    Stage 2 chronic kidney disease    Mixed hyperlipidemia    Essential hypertension    Depressive disorder    Subdural hematoma (HCC)    Encephalopathy acute    Urinary retention    Multiple falls    COVID-19    UTI (urinary tract infection)    Moderate protein-calorie malnutrition (HCC)     Active issues specifically addressed today include:   Subdural hematoma  Moderate protein calorie malnutrition  Encephalopathy  htn      Plan:  1  Symptom management -  -  Per primary team    2  Goals   -  Transition to comfort care/    -     Code Status: comfort care  - Level 4   Decisional apparatus:  Patient is not competent on my exam today  If competence is lost, patient's substitute decision maker would default to spouse by [place]  Advance Directive / Living Will / POLST:  None on file      I have reviewed the patient's controlled substance dispensing history in the Prescription Drug Monitoring Program in compliance with the [facility] regulations before prescribing any controlled substances  We appreciate the invitation to be involved in this patient's care  We will continue to follow  Please do not hesitate to reach our on call provider through our clinic answering service at  should you have acute symptom control concerns  RANDALL Quinn  Palliative and Supportive Care  Clinic/Answering Service: [phone]  You can find me on [employer]!        IDENTIFICATION:  Inpatient consult to Palliative Care  Consult performed by: RANDALL Davis  Consult ordered by: Brad Waite, [address] [place] [address]        Physician Requesting Consult: Joaquin De La Cruz MD  Reason for Consult / Principal Problem: goals of care   Hx and PE limited by: patient not able to participate   HISTORY OF PRESENT ILLNESS:       Efren Mathias is a 80 y o  male history of dementia, CKD, hypertension, falls and left-sided subdural hematoma with shannon hole in November who presented on January 21st for evacuation of a hematoma  Patient had a gradual decline in his mental status motor skills and speech while at rehab facility after original hospitalization in November  Patient had repeat CT scans which showed increase in subdural fluid collection  The patient's family elected to have a repeat evacuation of fluid an attempt to improve his neurological exam   The family was aware that this was a last chance effort  Since surgery the patient has been neurologically stable on was found by neurosurgery to be more interactive today  PT and OT is working with t he patient  He is not able to verbalize his needs  He does not consistently follow one step commands  Speech therapy is recommending puree diet with thin liquids however his PO intake remains poor  Met wioth the patient's family at bedside  The patient's family describe him as a go getter, social and loved karaoke  They states he would not be happy with the quality of life that he currently has and that even with significant improvement he would not want to live in that condition  Explained comfort care  Family decided that they would want to pursue comfort cares at this time as they do not want him to suffer  Comfort ordered placed          Review of Systems   Unable to perform ROS: patient nonverbal       Past Medical History:   Diagnosis Date    Anemia     Arthritis     At risk for falls     BPH (benign prostatic hyperplasia)     Chronic kidney disease     Chronic pain disorder     Depression     Dysphagia     Hyperlipidemia     Hypertension     Idiopathic peripheral autonomic neuropathy 8/19/2015    Urinary retention     rae cath placement    UTI (urinary tract infection)      Past Surgical History:   Procedure Laterality Date    APPENDECTOMY      BRAIN SURGERY      SHANNON HOLE FOR SUBDURAL HEMATOMA Left 11/27/2021    Procedure: Left sided bossman holes for subdural hematoma evacuation;  Surgeon: Joaquin Wen MD;  [facility];  Service: Neurosurgery    IR CEREBRAL ANGIOGRAPHY / INTERVENTION  11/30/2021    JOINT REPLACEMENT      OH OPEN SKULL EVAC HEMATOMA, SUPRATENTORIAL, SUB/ EXTRADURAL Left 1/21/2022    Procedure: Left sided craniotomy for evacuation subdural hematoma;   Surgeon: Joaquin Wen MD;  [facility];  Service: Neurosurgery     Social History     Socioeconomic History    Marital status: /Civil Union     Spouse name: Not on file    Number of children: Not on file    Years of education: Not on file    Highest education level: Not on file   Occupational History    Not on file   Tobacco Use    Smoking status: Former Smoker    Smokeless tobacco: Never Used   Vaping Use    Vaping Use: Never used   Substance and Sexual Activity    Alcohol use: Not Currently    Drug use: Never    Sexual activity: Not on file   Other Topics Concern    Not on file   Social History Narrative    Not on file     Social Determinants of Health     Financial Resource Strain: Not on file   Food Insecurity: Not on file   Transportation Needs: Not on file   Physical Activity: Not on file   Stress: Not on file   Social Connections: Not on file   Intimate Partner Violence: Not on file   Housing Stability: Not on file     Family History   Problem Relation Age of Onset    Alzheimer's disease Mother     Alzheimer's disease Sister        MEDICATIONS / ALLERGIES:    current meds:   Current Facility-Administered Medications   Medication Dose Route Frequency    ammonium lactate (LAC-HYDRIN) 12 % lotion   Topical BID    bisacodyl (DULCOLAX) rectal suppository 10 mg  10 mg Rectal Daily PRN    escitalopram (LEXAPRO) tablet 10 mg  10 mg Oral Daily    finasteride (PROSCAR) tablet 5 mg  5 mg Oral Daily    heparin (porcine) subcutaneous injection 5,000 Units  5,000 Units Subcutaneous Q12H [address]  HYDROmorphone HCl (DILAUDID) injection 0 2 mg  0 2 mg Intravenous Q4H PRN    levETIRAcetam (KEPPRA) tablet 500 mg  500 mg Oral Q12H [address]    metoprolol tartrate (LOPRESSOR) tablet 25 mg  25 mg Oral Q12H [address]    ondansetron (ZOFRAN) injection 4 mg  4 mg Intravenous Q6H PRN    oxyCODONE (ROXICODONE) IR tablet 2 5 mg  2 5 mg Oral Q4H PRN    oxyCODONE (ROXICODONE) IR tablet 5 mg  5 mg Oral Q4H PRN    pantoprazole (PROTONIX) injection 40 mg  40 mg Intravenous Daily    potassium chloride (K-DUR,KLOR-CON) CR tablet 10 mEq  10 mEq Oral Daily    pravastatin (PRAVACHOL) tablet 10 mg  10 mg Oral Daily    risperiDONE (RisperDAL) tablet 0 25 mg  0 25 mg Oral BID    risperiDONE (RisperDAL) tablet 2 mg  2 mg Oral HS    senna (SENOKOT) tablet 8 6 mg  1 tablet Oral Daily    sodium chloride 0 9 % infusion  50 mL/hr Intravenous Continuous       No Known Allergies    OBJECTIVE:    Physical Exam  Physical Exam  Vitals and nursing note reviewed  Constitutional:       Appearance: He is ill-appearing  Comments: B/l beau   HENT:      Head: Normocephalic  Jaw: There is normal jaw occlusion  Comments: Left scalp incision intact staples     Mouth/Throat:      Mouth: Mucous membranes are moist       Tongue: No lesions  Pharynx: Oropharynx is clear  Eyes:      General: Lids are normal    Cardiovascular:      Rate and Rhythm: Normal rate  Pulses: Normal pulses  Pulmonary:      Effort: Pulmonary effort is normal       Breath sounds: Normal breath sounds  Abdominal:      General: Abdomen is flat  Palpations: Abdomen is soft  Musculoskeletal:      Cervical back: Normal range of motion and neck supple  Comments: Generally weak   Skin:     General: Skin is warm and dry  Coloration: Skin is not jaundiced or pale  Neurological:      Mental Status: He is alert  Coordination: Coordination abnormal    Psychiatric:         Attention and Perception: He is inattentive           Mood and Affect: Mood is anxious  Speech: He is noncommunicative  Behavior: Behavior is uncooperative  Cognition and Memory: Cognition is impaired  Memory is impaired  Judgment: Judgment is impulsive  Lab Results:   CBC:   Lab Results   Component Value Date    WBC 8 28 01/24/2022    HGB 8 8 (L) 01/24/2022    HCT 28 4 (L) 01/24/2022    MCV 92 01/24/2022     01/24/2022    MCH 28 5 01/24/2022    MCHC 31 0 (L) 01/24/2022    RDW 13 7 01/24/2022    MPV 10 1 01/24/2022   , CMP:   Lab Results   Component Value Date    SODIUM 138 01/24/2022    K 3 4 (L) 01/24/2022     01/24/2022    CO2 26 01/24/2022    BUN 9 01/24/2022    CREATININE 0 54 (L) 01/24/2022    CALCIUM 8 3 01/24/2022    EGFR 99 01/24/2022   , PT/PTT:No results found for: PT, PTT      Counseling / Coordination of Care    Total floor / unit time spent today 90  minutes  Greater than 50% of total time was spent with the patient and / or family counseling and / or coordination of care  A description of the counseling / coordination of care:chart review, family meeting, family support, review of symptoms, opiod medications regimena nd comfort orders

## 2022-01-24 NOTE — PLAN OF CARE
Problem: OCCUPATIONAL THERAPY ADULT  Goal: Performs self-care activities at highest level of function for planned discharge setting  See evaluation for individualized goals  Description: Treatment Interventions: ADL retraining,Functional transfer training,UE strengthening/ROM,Endurance training,Cognitive reorientation,Patient/family training,Equipment evaluation/education,Compensatory technique education,Energy conservation,Activityengagement          See flowsheet documentation for full assessment, interventions and recommendations  Note: Limitation: Decreased ADL status,Decreased UE strength,Decreased Safe judgement during ADL,Decreased cognition,Decreased endurance,Decreased fine motor control,Decreased self-care trans,Decreased high-level ADLs  Prognosis: Fair  Assessment: Pt is a 80 y o  male who was admitted to [facility] on 1/21/2022 with Subdural hematoma ([facility] [address] ) s/p L sided craniotomy for evacuation of SDH 1/21/22   Pt's problem list also includes PMH of HTN, previous surgery, limited cognition, dementia and anemia, arthritis, BPH, CKD, chronic pain, depression, hld, peripheral, autonomic neuropathy, bossman hole evacuation of SDH  At baseline pt was completing adls and mobility Little - wife manages iadls - pt has required assist since prior admission in Nov 21 with all aspects of care and mobility -   Pt lives with wife in [place] - was in [place] visiting dtr on previous admission when pt developed change of MS and was found to have SDH s/p bossman hole evacuation  Currently pt requires max assist for overall ADLS and max a x 2  for functional mobility/transfers   Pt currently presents with impairments in the following categories -limited home support, difficulty performing ADLS, difficulty performing IADLS , limited insight into deficits, flat affect, decreased initiation and engagement , health management  and environment activity tolerance, endurance, standing balance/tolerance, sitting balance/tolerance, arousal, memory, insight, safety , judgement , attention , sequencing , task initiation , task termination  and communication  These impairments, as well as pt's fatigue, pain, decreased caregiver support, risk for falls and home environment  limit pt's ability to safely engage in all baseline areas of occupation, includingeating, grooming, bathing, dressing, toileting, functional mobility/transfers, community mobility, social participation  and leisure activities  From OT standpoint, recommend inpt rehab upon D/C  OT will continue to follow to address the below stated goals        OT Discharge Recommendation: Post acute rehabilitation services

## 2022-01-24 NOTE — DISCHARGE INSTRUCTIONS
Discharge Instructions  Evacuation of subdural hematoma    Activity:  1  Do not lift, push or pull more than 10 pounds for 2 weeks  2  Avoid bending, lifting and twisting for 2 weeks  No running  No athletic activities until cleared  3  No driving for at least 2 weeks or until cleared by Neurosurgery  4  When able to shower, continue to use clean towel and washcloth for 2 weeks post-op  5  Do not use a hair dryer, and avoid hair products such as mousse, oils, and gels  Do not brush your hair away from the incision since this will put strain on the suture line  6  Do not dye or perm hair for 6 weeks or until cleared by physician  7  Continue to change bed linens and pajamas more frequently  Wear clean clothes daily  8  May walk as tolerated  Recommend 4 short walks daily  Surgical incision care:  1  Keep dressings in place for 3 days  After 3 days, incisions may be left open to air, but should remain clean  2  Keep incisions dry for 3 days  3  May shower after 3 days using a baby shampoo including head incision  Rinse off shampoo and pat dry  4  Avoid rubbing the incision but gently massage hair  5  Do not immerse the incisions in water for 6 weeks  6  Staples/suture will be removed at your 2 week postoperative visit  7  Do not apply any creams or ointments to the incision, unless otherwise instructed by [facility]  8  Contact office if increasing redness, drainage, pain or swelling occurs around the incisions or if you develop a fever greater than 101F  9  Do not dye/perm hair or use any hair products until cleared by Neurosurgery  Postoperative medication:  1  [facility] will provide pain medication in the postoperative period  All prescriptions must come from a single practice  a  Take medications as prescribed  Call office with any questions/concerns  b  May use over the counter Tylenol  No NSAIDs (ie   Ibuprofen, Aleve, Advil, Naproxen)  2  Please contact office for questions regarding dosage and modifications  3  No antiplatelet or anticoagulation medication (ie  Coumadin, Aspirin, Plavix) until cleared by [facility], unless otherwise instructed  Please contact [facility] if you have any questions about the effects of any of your medications on blood clotting  4  Do not operate heavy machinery or vehicles while taking sedating medications  5  Use a bowel regimen while on opioids as they induce constipation  Ie  Senokot-S, Miralax, Colace, etc  Increase fiber and water intake  Follow-up for 2 week incision check/suture removal with a repeat CT head without contrast prior to visit  Follow-up for a 6 week post-operative visit  Please complete a repeat CT head without contrast to be completed prior to visit  ** Please notify the office if incision becomes red, swollen, tender, or has increased drainage, and temp>101  Return to the ER if you experience increased headache, drowsiness, weakness, nausea/vomiting, or seizures  **

## 2022-01-24 NOTE — ASSESSMENT & PLAN NOTE
Malnutrition Findings:   Adult Malnutrition type: Chronic illness  Adult Degree of Malnutrition: Malnutrition of moderate degree (related to illness as evidenced by reported 22% wt loss x6 months, <75% energy intake compared to estimated energy needs for >1 month  treatment= diet education and supplements)    BMI Findings: Body mass index is 14 98 kg/m²       · Nutrition consult

## 2022-01-24 NOTE — PLAN OF CARE
Problem: Potential for Falls  Goal: Patient will remain free of falls  Description: INTERVENTIONS:  - Educate patient/family on patient safety including physical limitations  - Instruct patient to call for assistance with activity   - Consult OT/PT to assist with strengthening/mobility   - Keep Call bell within reach  - Keep bed low and locked with side rails adjusted as appropriate  - Keep care items and personal belongings within reach  - Initiate and maintain comfort rounds  - Make Fall Risk Sign visible to staff  - Offer Toileting every  2 Hours, in advance of need  - Initiate/Maintain alarm  - Obtain necessary fall risk management equipment:   - Apply yellow socks and bracelet for high fall risk patients  - Consider moving patient to room near nurses station  Outcome: Progressing     Problem: Nutrition/Hydration-ADULT  Goal: Nutrient/Hydration intake appropriate for improving, restoring or maintaining nutritional needs  Description: Monitor and assess patient's nutrition/hydration status for malnutrition  Collaborate with interdisciplinary team and initiate plan and interventions as ordered  Monitor patient's weight and dietary intake as ordered or per policy  Utilize nutrition screening tool and intervene as necessary  Determine patient's food preferences and provide high-protein, high-caloric foods as appropriate       INTERVENTIONS:  - Monitor oral intake, urinary output, labs, and treatment plans  - Assess nutrition and hydration status and recommend course of action  - Evaluate amount of meals eaten  - Assist patient with eating if necessary   - Allow adequate time for meals  - Recommend/ encourage appropriate diets, oral nutritional supplements, and vitamin/mineral supplements  - Order, calculate, and assess calorie counts as needed  - Recommend, monitor, and adjust tube feedings and TPN/PPN based on assessed needs  - Assess need for intravenous fluids  - Provide specific nutrition/hydration education as appropriate  - Include patient/family/caregiver in decisions related to nutrition  Outcome: Progressing     Problem: SAFETY,RESTRAINT: NV/NON-SELF DESTRUCTIVE BEHAVIOR  Goal: Remains free of harm/injury (restraint for non violent/non self-detsructive behavior)  Description: INTERVENTIONS:  - Instruct patient/family regarding restraint use   - Assess and monitor physiologic and psychological status   - Provide interventions and comfort measures to meet assessed patient needs   - Identify and implement measures to help patient regain control  - Assess readiness for release of restraint   Outcome: Progressing  Goal: Returns to optimal restraint-free functioning  Description: INTERVENTIONS:  - Assess the patient's behavior and symptoms that indicate continued need for restraint  - Identify and implement measures to help patient regain control  - Assess readiness for release of restraint   Outcome: Progressing     Problem: BEHAVIOR  Goal: Pt/Family maintain appropriate behavior and adhere to behavioral management agreement, if implemented  Description: INTERVENTIONS:  - Assess the family dynamic   - Encourage verbalization of thoughts and concerns in a socially appropriate manner  - Assess patient/family's coping skills and non-compliant behavior (including use of illegal substances)  - Utilize positive, consistent limit setting strategies supporting safety of patient, staff and others  - Initiate consult with Case Management, Spiritual Care or other ancillary services as appropriate  - If a patient's/visitor's behavior jeopardizes the safety of the patient, staff, or others, refer to organization procedure     - Notify Security of behavior or suspected illegal substances which indicate the need for search of the patient and/or belongings  - Encourage participation in the decision making process about a behavioral management agreement; implement if patient meets criteria  Outcome: Progressing

## 2022-01-24 NOTE — ASSESSMENT & PLAN NOTE
· Acute encephalopathy in the setting of SDH an extended hospitalization/rehab stay  · Mental status continues to be at reported baseline:  GCS is 15, dysarthria-mumbles, oriented to self only    · Frequent reorientation  · Encourage sleep-wake cycle  · Continue home resperidone

## 2022-01-24 NOTE — ASSESSMENT & PLAN NOTE
· Will continue home metoprolol 25 mg b i d   · Continue to monitor BP with SBP goal <160--per Neurosurgery

## 2022-01-24 NOTE — OCCUPATIONAL THERAPY NOTE
Occupational Therapy Evaluation     Patient Name: Abram SULLIVAN Date: 1/24/2022  Problem List  Principal Problem:    Subdural hematoma ([address] )  Active Problems:    Essential hypertension    Encephalopathy acute    Urinary retention    Moderate protein-calorie malnutrition (HCC)    Past Medical History  Past Medical History:   Diagnosis Date    Anemia     Arthritis     At risk for falls     BPH (benign prostatic hyperplasia)     Chronic kidney disease     Chronic pain disorder     Depression     Dysphagia     Hyperlipidemia     Hypertension     Idiopathic peripheral autonomic neuropathy 8/19/2015    Urinary retention     rae cath placement    UTI (urinary tract infection)      Past Surgical History  Past Surgical History:   Procedure Laterality Date    APPENDECTOMY      BRAIN SURGERY      SHANNON HOLE FOR SUBDURAL HEMATOMA Left 11/27/2021    Procedure: Left sided shannon holes for subdural hematoma evacuation;  Surgeon: Jose Parham MD;  [facility];  Service: Neurosurgery    IR CEREBRAL ANGIOGRAPHY / INTERVENTION  11/30/2021    JOINT REPLACEMENT      MD OPEN SKULL EVAC HEMATOMA, SUPRATENTORIAL, SUB/ EXTRADURAL Left 1/21/2022    Procedure: Left sided craniotomy for evacuation subdural hematoma; Surgeon: Jose Parham MD;  [facility];  Service: Neurosurgery           01/24/22 0900   OT Last Visit   OT Visit Date 01/24/22   Note Type   Note type Evaluation   Restrictions/Precautions   Weight Bearing Precautions Per Order No   Other Precautions Cognitive; Chair Alarm; Bed Alarm;Multiple lines; Fall Risk;Pain;Restraints   Pain Assessment   Pain Assessment Tool FLACC   Pain Location/Orientation Location: Knee   Pain Rating: FLACC (Rest) - Face 1   Pain Rating: FLACC (Rest) - Legs 0   Pain Rating: FLACC (Rest) - Activity 1   Pain Rating: FLACC (Rest) - Cry 1   Pain Rating: FLACC (Rest) - Consolability 1   Score: FLACC (Rest) 4   Pain Rating: FLACC (Activity) - Face 1 Pain Rating: FLACC (Activity) - Legs 1   Pain Rating: FLACC (Activity) - Activity 1   Pain Rating: FLACC (Activity) - Cry 1   Pain Rating: FLACC (Activity) - Consolability 1   Score: FLACC (Activity) 5   Home Living   Type of [address] One level;Stairs to enter with rails   [address] [facility]   Prior Function   Level of [address] with ADLs and functional mobility   Lives With Spouse   Receives Help From Family   ADL Assistance Independent   IADLs Needs assistance   Falls in the last 6 months 1 to 4   Vocational Retired   Lifestyle   Autonomy At baseline pt was I with adls and mobility - shares homemaking with spouse - has required assist with all aspects of care since prior admission in November 2021 and transfer to [facility] rehab    Reciprocal Relationships supportive family - pt normally resides with wife in [place] - was visiting family in [place] on last admission   Service to Others retired   Intrinsic Gratification sedentary - unable to ID    Subjective   Subjective speech mostly gabled and difficult to understand    ADL   Eating Assistance 2  Maximal Assistance   Grooming Assistance 2  Maximal Assistance   UB Bathing Assistance 2  Maximal Assistance   LB Bathing Assistance 2  Maximal Assistance   UB Dressing Assistance 2  Maximal Assistance   LB Dressing Assistance 2  Maximal Assistance   Toileting Assistance  2  Maximal Assistance   Bed Mobility   Supine to Sit 2  Maximal assistance   Additional items Assist x 2   Sit to Supine 2  Maximal assistance   Additional items Assist x 2   Transfers   Sit to Stand 2  Maximal assistance   Additional items Assist x 2   Stand to Sit 2  Maximal assistance   Additional items Assist x 2   Stand pivot Unable to assess   Balance   Static Sitting Poor   Dynamic Sitting Poor   Static Standing Poor   Activity Tolerance   Activity Tolerance Patient limited by fatigue;Treatment limited secondary to medical complications (Comment)   Medical Staff Made Elizabeth Pump, DPT - coevaluation 2* medical complexity, comorbidities, unstable presentation and limited overall tolerance to activities impacting overall physical performance - OT focus on self care, cognition/safety and activity tolerance    RUE Assessment   RUE Assessment WFL   LUE Assessment   LUE Assessment WFL   Cognition   Overall Cognitive Status Impaired   Arousal/Participation Arousable;Persistent stimuli required;Poorly responsive   Attention Difficulty attending to directions   Orientation Level Oriented to person   Memory Decreased long term memory;Decreased recall of biographical information;Decreased short term memory;Decreased recall of recent events;Decreased recall of precautions   Following Commands Follows one step commands inconsistently   Assessment   Limitation Decreased ADL status; Decreased UE strength;Decreased Safe judgement during ADL;Decreased cognition;Decreased endurance;Decreased fine motor control;Decreased self-care trans;Decreased high-level ADLs   Prognosis Fair   Assessment Pt is a 80 y o  male who was admitted to [facility] on 1/21/2022 with Subdural hematoma (HCC) s/p L sided craniotomy for evacuation of SDH 1/21/22   Pt's problem list also includes PMH of HTN, previous surgery, limited cognition, dementia and anemia, arthritis, BPH, CKD, chronic pain, depression, hld, peripheral, autonomic neuropathy, bossman hole evacuation of SDH  At baseline pt was completing adls and mobility Little - wife manages iadls - pt has required assist since prior admission in Nov 21 with all aspects of care and mobility -   Pt lives with wife in [place] - was in [place] visiting dtr on previous admission when pt developed change of MS and was found to have SDH s/p bossman hole evacuation  Currently pt requires max assist for overall ADLS and max a x 2  for functional mobility/transfers   Pt currently presents with impairments in the following categories -limited home support, difficulty performing ADLS, difficulty performing IADLS , limited insight into deficits, flat affect, decreased initiation and engagement , health management  and environment activity tolerance, endurance, standing balance/tolerance, sitting balance/tolerance, arousal, memory, insight, safety , judgement , attention , sequencing , task initiation , task termination  and communication  These impairments, as well as pt's fatigue, pain, decreased caregiver support, risk for falls and home environment  limit pt's ability to safely engage in all baseline areas of occupation, includingeating, grooming, bathing, dressing, toileting, functional mobility/transfers, community mobility, social participation  and leisure activities  From OT standpoint, recommend inpt rehab upon D/C  OT will continue to follow to address the below stated goals  Goals   Patient Goals none stated 2* cognitive status    LTG Time Frame 10-14   Long Term Goal #1 refer to established goals below    Plan   Treatment Interventions ADL retraining;Functional transfer training;UE strengthening/ROM; Endurance training;Cognitive reorientation;Patient/family training;Equipment evaluation/education; Compensatory technique education; Energy conservation; Activityengagement   Goal Expiration Date 02/07/22   OT Frequency 3-5x/wk   Recommendation   OT Discharge Recommendation Post acute rehabilitation services   AM-PAC Daily Activity Inpatient   Lower Body Dressing 2   Bathing 2   Toileting 2   Upper Body Dressing 2   Grooming 2   Eating 2   Daily Activity Raw Score 12   Daily Activity Standardized Score (Calc for Raw Score >=11) 30 6   AM-PAC Applied Cognition Inpatient   Following a Speech/Presentation 2   Understanding Ordinary Conversation 2   Taking Medications 2   Remembering Where Things Are Placed or Put Away 2   Remembering List of 4-5 Errands 1   Taking Care of Complicated Tasks 1   Applied Cognition Raw Score 10   Applied Cognition Standardized Score 24 98       OCCUPATIONAL THERAPY GOALS:    *Pt to be oriented x 3 with min cues  *Follow 1 step commands with 100% consistency  *Min a feeding/grooming after setup   *Mod a adls after setup with use of AE PRN  *Mod a toileting and clothing management   *Mod a bed mobility and transfers bed to chair/commode with fair balance/safety for participation in self care tasks  *Assess DME needs   *Increase activity tolerance to 25-30 minutes for participation in adls and enjoyable activities  *Pt to participate in ongoing functional cognitive assessment with good attention/participation to assist with safe d/c recommendations    The patient's raw score on the AM-PAC Daily Activity inpatient short form is 12, standardized score is 30 6, less than 39 4  Patients at this level are likely to benefit from discharge to post-acute rehabilitation services  Please refer to the recommendation of the Occupational Therapist for safe discharge planning      Grady Walton

## 2022-01-24 NOTE — ASSESSMENT & PLAN NOTE
· History of SDH s/p Angel holes and MMA embolization  Multiple falls since  Found to have reaccumulation of L SDH  · Neurosurgaery was consulted and note appreciated     · 1/21- Left craniotomy for evacuation of left SDH  · 1/22- CTH stable, MRI negative  · 1/23- Drains removed, cleared by NSx  · Continue SBP <160  · Continue Prop Keppra  · Cleared for Heparin  · PT/OT  · FU with Neurosurgery in 2 weeks with repeat CTH  · Due to patient having significant decline over the past 2 months, will consult palliative for goals of care

## 2022-01-24 NOTE — PLAN OF CARE
Problem: PHYSICAL THERAPY ADULT  Goal: Performs mobility at highest level of function for planned discharge setting  See evaluation for individualized goals  Description: Treatment/Interventions: Functional transfer training,LE strengthening/ROM,Therapeutic exercise,Endurance training,Patient/family training,Equipment eval/education,Bed mobility,Continued evaluation,Spoke to nursing,OT  Equipment Recommended:  (continue to assess )       See flowsheet documentation for full assessment, interventions and recommendations  Note: Prognosis: Fair  Problem List: Decreased strength,Decreased range of motion,Decreased endurance,Impaired balance,Decreased mobility,Decreased cognition,Impaired judgement,Decreased safety awareness,Pain  Assessment: Pt is 80 y o  male seen for PT evaluation s/p admit to [facility] on 1/21/2022  Two pt identifiers were used to confirm  Pt presented w/ gradual functional decline at rehab as well as decline in mental status  Pt was recently d/c from [facility] to [facility] rehab on 12/21/21 and has had a decline in status resulting in pt returning to [facility]  Pt was admitted with a primary dx of:  Subdural hematoma  Patient underwent Left sided craniotomy for evacuation subdural hematoma (Left) which was performed on 1/21/21  PT now consulted for assessment of mobility and d/c needs  Pt with Out of bed orders  Pts current co morbidities affecting treatment include: Anemia, arthritis, CKD, chronic pain disorder, depression, dysphagic, HLD, HTN, idiopathic peripheral autonomic neuropathy, history of bossman holes and MMA embolization   Pts current clinical presentation is Unstable/ Unpredictable (high complexity) due to Ongoing medical management for primary dx, Decreased activity tolerance compared to baseline, Fall risk, Increased assistance needed from caregiver at current time, Cog status, Continuous pulse oximetry monitoring , s/p surgical intervention    Upon evaluation, pt currently is requiring Max Ax2 for bed mobility; Max Ax2 for sit <-> stand transfers  Pt presents at PT eval functioning below baseline and currently w/ overall mobility deficits 2* to: BLE weakness, decreased ROM, impaired balance, decreased endurance, pain, decreased activity tolerance compared to baseline, decreased safety awareness, impaired judgement, fall risk, decreased cognition  Pt currently at a fall risk 2* to impairments listed above  Based on the aforementioned PT evaluation, pt will continue to benefit from skilled Acute PT interventions to address stated impairments; to maximize functional mobility; for ongoing pt/ family training; and DME needs  At conclusion of PT session pt returned BTB and bed alarm engaged with phone and call bell within reach and b/l mitts back on pt  PT is currently recommending Rehab  PT will continue to follow during hospital stay  PT Discharge Recommendation: Post acute rehabilitation services          See flowsheet documentation for full assessment

## 2022-01-24 NOTE — SPEECH THERAPY NOTE
Speech/Language Pathology Progress Note    Patient Name: Flores Pike  [id number] Date: 1/24/2022    Subjective:  Pt awake but decreased attention  Yelling out in repositioning, unable to indicate whether or not he had pain/where  Inconsistently responsive to communication and cueing  Objective:  Pt seen for dysphagia therapy  Diet is puree with nectar thick liquids  Pt was offered bites of apple sauce and sips of NTL via tsp and cup  Adequate bolus retrieval by spoon initially however after several bites pt began to yell that he did not want any more  Sips of NTL via tsp and cup were tolerated without s/s aspiration  Drinks were also refused after several sips  Oral cavity was significantly dry initially which drastically improved with PO sips  RN stated pt did well with his meds this morning  Assessment:  Swallow is functional/safe for puree/thin liquids, however intakes currently are poor  Plan/Recommendations:  Continue pure diet and NTL, encourage intake, ST will f/u pending GOC - palliative consult has been placed

## 2022-01-24 NOTE — PROGRESS NOTES
[facility]  Progress Note - Stacey Robles 1940, 80 y o  male MRN: [id number]  Unit/Bed#: [facility] 607-01 Encounter: [id number]  Primary Care Provider: No primary care provider on file  Date and time admitted to hospital: 1/21/2022  5:30 AM    Multiple falls  Assessment & Plan  · Multiple falls leading to SDH  · Injuries listed below  · PT/OT    * Subdural hematoma (HCC)  Assessment & Plan  · History of SDH s/p Angel holes and MMA embolization  Multiple falls since  Found to have reaccumulation of L SDH  · Neurosurgaery was consulted and note appreciated  · 1/21- Left craniotomy for evacuation of left SDH  · 1/22- CTH stable, MRI negative  · 1/23- Drains removed, cleared by NSx  · Continue SBP <160  · Continue Prop Keppra  · Cleared for Heparin  · PT/OT  · FU with Neurosurgery in 2 weeks with repeat CTH  · Due to patient having significant decline over the past 2 months, will consult palliative for goals of care      Encephalopathy acute  Assessment & Plan  · Acute encephalopathy in the setting of SDH an extended hospitalization/rehab stay  · Mental status continues to be at reported baseline:  GCS is 15, dysarthria-mumbles, oriented to self only  · Frequent reorientation  · Encourage sleep-wake cycle  · Continue home resperidone    Moderate protein-calorie malnutrition ([address] )  Assessment & Plan  Malnutrition Findings:   Adult Malnutrition type: Chronic illness  Adult Degree of Malnutrition: Malnutrition of moderate degree (related to illness as evidenced by reported 22% wt loss x6 months, <75% energy intake compared to estimated energy needs for >1 month  treatment= diet education and supplements)    BMI Findings: Body mass index is 14 98 kg/m²  · Nutrition consult    Urinary retention  Assessment & Plan  · Patient presented with catheter on admission  · Patient had urinary retention during previous hospitalization, discharged 12/20/2021    He was supposed to undergo a voiding trial at rehab and ultimately follow-up with Urology  No follow-up noted  · Will plan to discontinue catheter today for a voiding trial   · Continue home Proscar  · Condom catheter as needed  · Urinary retention protocol    Depressive disorder  Assessment & Plan  · Continue home Lexapro, risperidone     Essential hypertension  Assessment & Plan  · Will continue home metoprolol 25 mg b i d   · Continue to monitor BP with SBP goal <160--per Neurosurgery    Mixed hyperlipidemia  Assessment & Plan  · Continue home pravastatin        Disposition:  Pending PT/OT eval--likely return to rehab      SUBJECTIVE:  Chief Complaint:  No complaints offered  Subjective:  ROS was significantly limited due to current mental status  Patient was able to tell me his name and that he is not in pain        OBJECTIVE:     Meds/Allergies     Current Facility-Administered Medications:     ammonium lactate (LAC-HYDRIN) 12 % lotion, , Topical, BID, Lorita Stagers, PA-C, Given at 01/24/22 2470    bisacodyl (DULCOLAX) rectal suppository 10 mg, 10 mg, Rectal, Daily PRN, Lorita Stagers, PA-C    escitalopram (LEXAPRO) tablet 10 mg, 10 mg, Oral, Daily, Lorita Stagers, PA-C, 10 mg at 01/24/22 7796    finasteride (PROSCAR) tablet 5 mg, 5 mg, Oral, Daily, Lorita Stagers, PA-C, 5 mg at 01/24/22 2314    heparin (porcine) subcutaneous injection 5,000 Units, 5,000 Units, Subcutaneous, Q12H [address], Lorita Stagers, PA-C, 5,000 Units at 01/24/22 4421    HYDROmorphone HCl (DILAUDID) injection 0 2 mg, 0 2 mg, Intravenous, Q4H PRN, Lorita Stagers, PA-C, 0 2 mg at 01/22/22 1626    levETIRAcetam (KEPPRA) tablet 500 mg, 500 mg, Oral, Q12H [address], RANDALL Huynh    metoprolol tartrate (LOPRESSOR) tablet 25 mg, 25 mg, Oral, Q12H [address], RANDALL Huynh    ondansetron [facility]) injection 4 mg, 4 mg, Intravenous, Q6H PRN, Lorita Stagers, PA-C    oxyCODONE (ROXICODONE) IR tablet 2 5 mg, 2 5 mg, Oral, Q4H PRN, Melissa Espinosa Elissa Patel, PA-C    oxyCODONE (ROXICODONE) IR tablet 5 mg, 5 mg, Oral, Q4H PRN, Pranav Cola, PA-C    pantoprazole (PROTONIX) injection 40 mg, 40 mg, Intravenous, Daily, Pranav Cola, PA-C, 40 mg at 01/24/22 0837    potassium chloride (K-DUR,KLOR-CON) CR tablet 10 mEq, 10 mEq, Oral, Daily, Pranav Cola, PA-C, 10 mEq at 01/24/22 3102    pravastatin (PRAVACHOL) tablet 10 mg, 10 mg, Oral, Daily, Pranav Cola, PA-C, 10 mg at 01/24/22 7870    risperiDONE (RisperDAL) tablet 0 25 mg, 0 25 mg, Oral, BID, Pranav Cola, PA-C, 0 25 mg at 01/24/22 8262    risperiDONE (RisperDAL) tablet 2 mg, 2 mg, Oral, HS, Pranav Cola, PA-C, 2 mg at 01/23/22 2139    senna (SENOKOT) tablet 8 6 mg, 1 tablet, Oral, Daily, Pranav Cola, PA-C, 8 6 mg at 01/24/22 6235    sodium chloride 0 9 % infusion, 50 mL/hr, Intravenous, Continuous, Pranav Cola, PA-C, Last Rate: 50 mL/hr at 01/23/22 0922, 50 mL/hr at 01/23/22 0922     Vitals:   Vitals:    01/24/22 1012   BP: (!) 100/49   Pulse: 100   Resp: 18   Temp: 98 5 °F (36 9 °C)   SpO2: 97%       Intake/Output:  I/O       01/22 0701 01/23 0700 01/23 0701  01/24 0700 01/24 0701  01/25 0700    P  O   300 120    I V  (mL/kg) 1800 (39 1) 550 (12)     IV Piggyback 250 300     Total Intake(mL/kg) 2050 (44 6) 1150 (25) 120 (2 6)    Urine (mL/kg/hr) 1440 (1 3) 680 (0 6)     Drains 75      Blood       Total Output 1515 680     Net +535 +470 +120                  Nutrition/GI Proph/Bowel Reg:  Continue current diet and bowel regimen  Physical Exam:   GENERAL APPEARANCE:  No acute distress  NEURO:  GCS is 14 due to confusion  Garbled speech  HEENT:  Scalp drain sites are well approximated with sutures  Otherwise, face is nontender  Neck is supple  CV: RRR, +2 radial and dorsalis pedis pulses, bilaterally  LUNGS:  Clear to auscultation, bilaterally  No wheezing, no rhonchi, no rales audible  GI:  Abdomen is soft and nontender  :  Pelvis stable    MSK:  Patient moving all extremities  No deformities  SKIN:  Warm, dry, well perfused  Invasive Devices  Report    Peripheral Intravenous Line            Peripheral IV 01/21/22 Right;Dorsal (posterior) Wrist 3 days          Drain            External Urinary Catheter Small <1 day                 Lab Results: Results: I have personally reviewed pertinent reports  Imaging/EKG Studies: Results: I have personally reviewed pertinent reports      Other Studies: none  VTE Prophylaxis: Sequential compression device (Venodyne)  and Heparin

## 2022-01-24 NOTE — CONSULTS
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  -following recurrent SDH requiring surgical evacuation x2 and MMA  -remains impulsive and restless  -currently on Risperidone and Lorazepam, previously intolerant of Seroquel due to tachycardia per STR documentation   -consider increase dose of Lorazeam for symptom management as has best response to lorazepam per STR documentation   -Palliative Care consult pending for goals of care discussion given patients continued functional decline, appreciate input, given patients continued decline consider hospice referral if family amenable  -continue to encourage calm and quiet environment to reduce risk overstimulation     Traumatic acute on chronic subdural hematoma  -hx bossman hole drainage and MMA embolization now s/p craniotomy for evacuation of reaccumulation of SDH despite not being on systemic anticoagulation   -currently on keppra for seizure ppx  -Nsx following     Depressive disorder  -previously on Celexa transitioned to Escitalopram as o/p  -continue psychosocial supports of patient and family    Moderate protein calorie malnutrition  -due to chronic illness, evidence by 22% weight loss over the past 6 months, less than 75% energy intake compared to needs for over 1 month and diffuse visible fat muscle wasting  -nutritional intake limited due to oropharyngeal dysphagia and persistent encephalopathy, nutrition following, appreciate recommendations including addition of Ensure pudding and breakfast and lunch and Magic cup within encouragement of PO intake    Insomnia  -currently on Risperidone and Lorazepam HS  -encourage consistent sleep/wake times, minimize stimulating activities/agents in late afternoon/early evening to reduce disruption of normal circadian rhythm  -consider addition of melatonin HS    Oropharyngeal dysphagia  -currently on dysphagia diet, speech therapy following, appreciate recommendations  -continue aspiration precautions    Frailty syndrome in geriatric patient  -clinical frailty scale stage 7/8, severely to very severely frail  -multifactorial including age, recurrent subdural hematoma requiring surgical evacuation, and poor nutritional intake in elderly individual with limited physiologic and metabolic reserve  -continue optimization chronic medical conditions and address acute metabolic derangements as arise  -palliative care/hospice referral is appropriate given patient's continued decline highly likely to continue to decline despite ongoing aggressive measures, continue to provide spiritual in psychosocial support to patient and family through this difficult time, appreciate palliative/hospice services input    Home medication review    Personally confirmed with medication list obtained from [facility] [address] [place]:    Tylenol 1000 mg Q8H  Ascorbic acid 125 mg daily  Ammonium lactate 12% topical to lower legs BID  Bacitracin 500u/gm - apply topically to penis BID  Escitalopram 10 mg daily  Finasteride 5 mg daily  Lorazepam 0 5 mg HS  Metoprolol tartrate 25 mg Q12H  Multivitamin daily  Pantoprazole 40 mg daily before breakfast  KCL 10 mEq daily  Pravastatin 10 mg daily  Risperidone 0 25 mg BID   AND  Risperidone 2mg in PM  Senna daily at bedtime  Bactrim 800 mg-160 mg BID x 3 days ending 1/20/22  Bisacodyl PRN  Lorazepam 0 5 mg HS    Care coordination:  Rounded with Shante Mcnally (RN)    History of Present Illness   Physician Requesting Consult: Tsering Lozada MD  Reason for Consult / Principal Problem: Acute encephalopathy  Hx and PE limited by: encephalopathy   Additional history obtained from: Chart review and patient evaluation    HPI: Cheyenne Ramon is a 80y o  year old male with hypertension, hyperlipidemia, depression disorder, chronic back pain BPH and ambulatory dysfunction recurrent falls who is admitted to the trauma service with acute on chronic left subdural hematoma s/p craniotomy and evacuation, consultation by Geriatrics for encephalopathy  He is known to this service from prior hospitalization at which time he was admitted with ambulatory dysfunction recurrent falls and worsening confusion found to have left frontoparietal subdural hematoma requiring bossman hole evacuation and MMA embolization  Following recovery from procedures he was discharged to [facility] in [place] for short where he underwent extensive physical occupational and cognitive therapies  At his Neurosurgery follow-up it was noted that his exam had started to decline despite intensive therapies and further evaluation revealed increase in subdural fluid collection compared to prior for which he underwent left craniotomy on 1/21/22  At time of evaluation today he is unable to participate in HPI and thus it was obtained from chart review including outside records from [facility]  Prior to initial admission in November he was fully independent with all ADLs and IADLs driving and residing with his wife in [place], they were up to visit their daughter who resides here in [place] when his symptoms first started and he presented to the hospital for evaluation at which time subdural hematoma was found       Inpatient consult to Gerontology  Consult performed by: Laquita Cuenca DO  Consult ordered by: RANDALL Hong        Review of Systems   Unable to perform ROS: Mental status change     Historical Information   Past Medical History:   Diagnosis Date    Anemia     Arthritis     At risk for falls     BPH (benign prostatic hyperplasia)     Chronic kidney disease     Chronic pain disorder     Depression     Dysphagia     Hyperlipidemia     Hypertension     Idiopathic peripheral autonomic neuropathy 8/19/2015    Urinary retention     rae cath placement    UTI (urinary tract infection)      Past Surgical History:   Procedure Laterality Date    APPENDECTOMY      BRAIN SURGERY      SHANNON HOLE FOR SUBDURAL HEMATOMA Left 11/27/2021    Procedure: Left sided shannon holes for subdural hematoma evacuation;  Surgeon: Coty Corbett MD;  [facility];  Service: Neurosurgery    IR CEREBRAL ANGIOGRAPHY / INTERVENTION  11/30/2021    JOINT REPLACEMENT      MD OPEN SKULL EVAC HEMATOMA, SUPRATENTORIAL, SUB/ EXTRADURAL Left 1/21/2022    Procedure: Left sided craniotomy for evacuation subdural hematoma; Surgeon: Coty Corbett MD;  [facility];  Service: Neurosurgery     Social History   Social History     Substance and Sexual Activity   Alcohol Use Not Currently     Social History     Substance and Sexual Activity   Drug Use Never     Social History     Tobacco Use   Smoking Status Former Smoker   Smokeless Tobacco Never Used     Family History:   Family History   Problem Relation Age of Onset    Alzheimer's disease Mother     Alzheimer's disease Sister      Meds/Allergies   all current active meds have been reviewed    No Known Allergies    Objective     Intake/Output Summary (Last 24 hours) at 1/24/2022 1551  Last data filed at 1/24/2022 1201  Gross per 24 hour   Intake 220 ml   Output 535 ml   Net -315 ml     Invasive Devices  Report    Peripheral Intravenous Line            Peripheral IV 01/21/22 Right;Dorsal (posterior) Wrist 3 days          Drain            External Urinary Catheter Small <1 day              Physical Exam  Vitals and nursing note reviewed     Constitutional:       Comments: Thin, cachetic appearing, restless   HENT:      Head:      Comments: Periorbital and temporal areas sunken in appearance    Left-sided craniotomy site with staples place     Nose: Nose normal       Mouth/Throat:      Mouth: Mucous membranes are dry  Eyes:      General: No scleral icterus  Neck:      Comments: Trachea prominent due to fat and muscle wasting  Cardiovascular:      Rate and Rhythm: Normal rate  Pulses: Normal pulses  Pulmonary:      Comments: Distant breath sounds, no rales or rhonci  Abdominal:      Palpations: Abdomen is soft  Musculoskeletal:      Right lower leg: No edema  Left lower leg: No edema  Comments: Diffuse severe subcutaneous fat and muscle wasting   Skin:     General: Skin is warm and dry     Neurological:      Comments: Mentation fluctuates, somnolent alternating with restless, does not consistently open eyes to voice or tactile stimulation , does not verbalize, does not follow commands    Psychiatric:      Comments: Restless, inattentive, impulsive       Lab Results:   I have personally reviewed pertinent lab results including the following:    CBC and BMP 1/24/22    I have personally reviewed the following imaging study reports in PACS:    MRI brain without contrast 1/23/22  CT head without contrast 1/22/22    Therapies:   PT:  Following  OT:  Following    VTE Prophylaxis: Sequential compression device (Venodyne)     Code Status: Level 1 - Full Code  Advance Directive and Living Will:      Power of :    POLST:      Family and Social Support:   Living Arrangements: Lives w/ Spouse/significant other  Support Systems: Self; Spouse/significant other; Daughter  Type of Current Residence: [facility]  Discharge planning discussed with[de-identified] Pt's dtr  Freedom of Choice: Yes    Goals of Care:  Discussions ongoing - palliative consult pending

## 2022-01-24 NOTE — TELEPHONE ENCOUNTER
1/27/22- PT TRANSITIONED TO HOSPICE CARE  PER TED FREED TO CX POV'S    1/26/22- [facility]    1/25/22- [facility]    1/24/22- [facility]  2WK POV SCHEDULED 2/4/22  PT WILL NEED CT HEAD  6WK POV SCHEDULED 3/3/22    ----- Message from [address] sent at 1/24/2022  7:57 AM EST -----  Patient needs 2 week incision check with CT head and 6 week post op visit with Shimon Willis was on 1/21

## 2022-01-24 NOTE — CASE MANAGEMENT
Case Management Assessment & Discharge Planning Note    Patient name Kassi Pollard  Location [address] [place] [address] [facility] [id number]  : 1940 Date 2022       Current Admission Date: 2022  Current Admission Diagnosis:Subdural hematoma [facility])   Patient Active Problem List    Diagnosis Date Noted    Moderate protein-calorie malnutrition ([facility] [address] ) 2022    UTI (urinary tract infection) 2021    COVID-19 2021    Multiple falls 12/10/2021    Encephalopathy acute 2021    Urinary retention 2021    Stage 2 chronic kidney disease 2021    Essential hypertension 2021    Depressive disorder 2021    Subdural hematoma ([facility] [address] ) 2021    Mixed hyperlipidemia 2008      LOS (days): 3  Geometric Mean LOS (GMLOS) (days): 3 80  Days to GMLOS:0 6     OBJECTIVE:    Risk of Unplanned Readmission Score: 22         Current admission status: Inpatient       Preferred Pharmacy:   [facility] [place] [address]  Phone: [phone] Fax: [phone]    Primary Care Provider: No primary care provider on file  Primary Insurance: MEDICARE MISC REPLACEMENT  Secondary Insurance:     ASSESSMENT:  [address], [address] [facility] Representative - Daughter   Primary Phone: [phone] (Mobile)                         Readmission Root Cause  30 Day Readmission: No    Patient Information  Admitted from[de-identified] Home  Mental Status: Alert,Confused  During Assessment patient was accompanied by: Not accompanied during assessment  Assessment information provided by[de-identified] Daughter  Primary Caregiver: Self  Support Systems: Self,Spouse/significant other,Daughter  [place] of Residence: Other (specify in comment box) ([place])  What city do you live in?: [address] entry access options   Select all that apply : Stairs  Number of steps to enter home : 6 (6-8)  Do the steps have railings?: Yes  Type of Current Residence: [facility]  In the last 12 months, was there a time when you were not able to pay the mortgage or rent on time?: No  In the last 12 months, was there a time when you did not have a steady place to sleep or slept in a shelter (including now)?: No  Homeless/housing insecurity resource given?: N/A  Living Arrangements: Lives w/ Spouse/significant other  Is patient a ?: No    Activities of Daily Living Prior to Admission  Functional Status: Assistance  Completes ADLs independently?: No  Level of ADL dependence: Assistance  Ambulates independently?: No  Level of ambulatory dependence: Assistance  Does patient use assisted devices?: Yes  Assisted Devices (DME) used: Walker,Wheelchair,Bedside Commode  Does patient currently own DME?: Yes  What DME does the patient currently own?: Walker,Wheelchair,Bedside Commode  Does patient have a history of Outpatient Therapy (PT/OT)?: No  Does the patient have a history of Short-Term Rehab?: Yes ([facility])  Does patient have a history of HHC?: No  Does patient currently have [facility]?: No         Patient Information Continued  Income Source: Pension/senior care  Does patient have prescription coverage?: Yes  Within the past 12 months, you worried that your food would run out before you got the money to buy more : Never true  Within the past 12 months, the food you bought just didnt last and you didnt have money to get more : Never true  Food insecurity resource given?: N/A  Does patient receive dialysis treatments?: No  Does patient have a history of substance abuse?: No  Does patient have a history of Mental Health Diagnosis?: No         Means of Transportation  [facility]] Family transport  In the past 12 months, has lack of transportation kept you from medical appointments or from getting medications?: No  In the past 12 months, has lack of transportation kept you from meetings, work, or from getting things needed for daily living?: No  Was application for public transport provided?: N/A        DISCHARGE DETAILS:    Discharge planning discussed with[de-identified] Pt's dtr  Freedom of Choice: Yes     CM contacted family/caregiver?: Yes  Were Treatment Team discharge recommendations reviewed with patient/caregiver?: Yes  Did patient/caregiver verbalize understanding of patient care needs?: Yes  Were patient/caregiver advised of the risks associated with not following Treatment Team discharge recommendations?: Yes    Contacts  Patient Contacts: Mark Pereira (Daughter)  Relationship to Patient[de-identified] Family  Contact Method: Phone  Phone Number: [phone]  Reason/Outcome: Continuity of Care,Emergency Contact,Discharge Planning           Cm spoke to pt's dtr Esthela Butler to discuss the role of CM  Pt is known to this service, as he was a recent d/c to [facility] for TBI rehab  Pt's previous open was:   Pt lives in [place] with his wife (both retired) and were in [place] visiting Esthela Butler for the holidays  As per Esthela Butler, the plan will be to remain in [place] for a period of time before returning  Kinga's home is 1 story which has 6-8STE  Her bathroom is a tub/shower with grab bars and a standard toilet  Pt doesn't drive  Pt is a retired   Pt's dtr reports the pt was fully independent for all ADL/IADLs but that "a few days ago" he started to need help putting on his socks, managing his belt buckle, and stopped cooking  Pt's had 1 fall in the last 6 months  Pt enjoys swimming in the pool, singing , watching westerns, playing cards, and riding in his golf cart  Pt is MOD/I with a Quad cane for ambulation  Pt also owns a walker and shower chair  Pt [facility]  Pt has a living will  Pt reports a remote hx of ETOH abuse 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  Pt has hx of Depression but no IP Psych  CM will appreciate therapy recommendations when appropriate       CM reviewed d/c planning process including the following: identifying help at home, patient preference for d/c planning needs, Discharge [facility] program, availability of treatment team to discuss questions or concerns patient and/or family may have regarding understanding medications and recognizing signs and symptoms once discharged  CM also encouraged patient to follow up with all recommended appointments after discharge  Patient advised of importance for patient and family to participate in managing patients medical well being

## 2022-01-24 NOTE — PHYSICAL THERAPY NOTE
PHYSICAL THERAPY EVALUATION  NAME:  Devon Chavez  DATE: 01/24/22    AGE:   80 y o   Mrn:   [id number]  ADMIT DX:  Subdural hematoma ([address] ) [S06 [id number]]    Past Medical History:   Diagnosis Date    Anemia     Arthritis     At risk for falls     BPH (benign prostatic hyperplasia)     Chronic kidney disease     Chronic pain disorder     Depression     Dysphagia     Hyperlipidemia     Hypertension     Idiopathic peripheral autonomic neuropathy 8/19/2015    Urinary retention     rae cath placement    UTI (urinary tract infection)        Past Surgical History:   Procedure Laterality Date    APPENDECTOMY      BRAIN SURGERY      SHANNON HOLE FOR SUBDURAL HEMATOMA Left 11/27/2021    Procedure: Left sided shannon holes for subdural hematoma evacuation;  Surgeon: Katia Torres MD;  [facility];  Service: Neurosurgery    IR CEREBRAL ANGIOGRAPHY / INTERVENTION  11/30/2021    JOINT REPLACEMENT      NV OPEN SKULL EVAC HEMATOMA, SUPRATENTORIAL, SUB/ EXTRADURAL Left 1/21/2022    Procedure: Left sided craniotomy for evacuation subdural hematoma; Surgeon: Katia Torres MD;  [facility];  Service: Neurosurgery       Length Of Stay: 3    PHYSICAL THERAPY EVALUATION:        01/24/22 0856   Note Type   Note type Evaluation   Pain Assessment   Pain Assessment Tool FLACC   Pain Rating: FLACC (Rest) - Face 0   Pain Rating: FLACC (Rest) - Legs 0   Pain Rating: FLACC (Rest) - Activity 0   Pain Rating: FLACC (Rest) - Cry 0   Pain Rating: FLACC (Rest) - Consolability 0   Score: FLACC (Rest) 0   Pain Rating: FLACC (Activity) - Face 1   Pain Rating: FLACC (Activity) - Legs 1   Pain Rating: FLACC (Activity) - Activity 2   Pain Rating: FLACC (Activity) - Cry 2   Pain Rating: FLACC (Activity) - Consolability 1   Score: FLACC (Activity) 7   Restrictions/Precautions   Weight Bearing Precautions Per Order No   Other Precautions Cognitive; Chair Alarm; Bed Alarm;Multiple lines; Fall Risk;Pain;Restraints  (b/l mitts )   Home Living   Type of [address] [place] [address] One level;Stairs to enter with rails  (6-8 ARJUN )   [address] [facility]   Additional Comments Patient poor historian  Per patient's chart at baseline patient resides with spouse in the above set up, but patient has been at [facility] since [facility]/c from [facility] on 12/21   Prior Function   Level of [place] Independent with ADLs and functional mobility   Lives With Spouse   Receives Help From Family   Comments Patient poor historian, need to clarify patient's prior level of function   General   Family/Caregiver Present No   Cognition   Overall Cognitive Status Impaired   Arousal/Participation Responsive   Orientation Level Oriented to person;Disoriented to place; Disoriented to time;Disoriented to situation   Memory Decreased recall of precautions;Decreased short term memory;Decreased recall of recent events   Following Commands Follows one step commands inconsistently   RUE Assessment   RUE Assessment WFL   LUE Assessment   LUE Assessment WFL   RLE Assessment   RLE Assessment X  (increased knee pain w/ AROM  knee flexion contracture )   Strength RLE   RLE Overall Strength 2/5  (limited by pain )   LLE Assessment   LLE Assessment X  (knee flexion contracture )   Strength LLE   LLE Overall Strength 2+/5   Bed Mobility   Supine to Sit 2  Maximal assistance   Additional items Assist x 2; Increased time required;Verbal cues   Sit to Supine 2  Maximal assistance   Additional items Assist x 2; Increased time required;Verbal cues   Additional Comments Pt able to tolerate sitting EOB x 3 min with mod A required to maintain sitting balance  Transfers   Sit to Stand 2  Maximal assistance   Additional items Assist x 2; Increased time required;Verbal cues   Stand to Sit 2  Maximal assistance   Additional items Assist x 2; Increased time required;Verbal cues   Additional Comments Attempted sit <-> stand transfers with max Ax 2, but was unable to achieve full standing   Pt was able to clear buttock from bed   Ambulation/Elevation   Gait pattern Not appropriate   Balance   Static Sitting Poor   Dynamic Sitting Poor   Static Standing Poor -   Endurance Deficit   Endurance Deficit Yes   Endurance Deficit Description fatigue, pain    Activity Tolerance   Activity Tolerance Patient limited by fatigue;Patient limited by pain   Medical Staff [facility]; [place] [facility]; OT present for co evaluation due to pts unstable presentation and decreased activity tolerance which all impact pts overall physical performance    Nurse Made Aware Patient appropriate to be seen and mobilized per nursing   Assessment   Prognosis Fair   Problem List Decreased strength;Decreased range of motion;Decreased endurance; Impaired balance;Decreased mobility; Decreased cognition; Impaired judgement;Decreased safety awareness;Pain   Assessment Pt is 80 y o  male seen for PT evaluation s/p admit to [facility] on 1/21/2022  Two pt identifiers were used to confirm  Pt presented w/ gradual functional decline at rehab as well as decline in mental status  Pt was recently d/c from [facility] to [facility] on 12/21/21 and has had a decline in status resulting in pt returning to [facility]  Pt was admitted with a primary dx of:  Subdural hematoma  Patient underwent Left sided craniotomy for evacuation subdural hematoma (Left) which was performed on 1/21/21  PT now consulted for assessment of mobility and d/c needs  Pt with Out of bed orders  Pts current co morbidities affecting treatment include: Anemia, arthritis, CKD, chronic pain disorder, depression, dysphagic, HLD, HTN, idiopathic peripheral autonomic neuropathy, history of bossman holes and MMA embolization    Pts current clinical presentation is Unstable/ Unpredictable (high complexity) due to Ongoing medical management for primary dx, Decreased activity tolerance compared to baseline, Fall risk, Increased assistance needed from caregiver at current time, Cog status, Continuous pulse oximetry monitoring , s/p surgical intervention    Upon evaluation, pt currently is requiring Max Ax2 for bed mobility; Max Ax2 for sit <-> stand transfers  Pt presents at PT eval functioning below baseline and currently w/ overall mobility deficits 2* to: BLE weakness, decreased ROM, impaired balance, decreased endurance, pain, decreased activity tolerance compared to baseline, decreased safety awareness, impaired judgement, fall risk, decreased cognition  Pt currently at a fall risk 2* to impairments listed above  Based on the aforementioned PT evaluation, pt will continue to benefit from skilled Acute PT interventions to address stated impairments; to maximize functional mobility; for ongoing pt/ family training; and DME needs  At conclusion of PT session pt returned BTB and bed alarm engaged with phone and call bell within reach and b/l mitts back on pt  PT is currently recommending Rehab  PT will continue to follow during hospital stay  Goals   Patient Goals none stated 2* pts cog status    STG Expiration Date 02/07/22   Short Term Goal #1 In 14 days pt will complete: 1) Bed mobility skills with min Ax1 to increase safety and independence as well as decrease caregiver burden  2) Functional sit <-> stand transfers with mod Ax1 to promote increased independence, safety, and QOL  3) Improve balance grades by 1/2 grade to increase safety with all mobility and decrease fall risk  4) Improve BLE strength by 1/2 grade to help increase overall functional mobility and decrease fall risk  5) pt will tolerate sitting EOB x 30 min while participating with PT in order to increase pts overall activity tolerance  6)Further out of bed transfers and ambulation to be assessed when appropriate  PT to see at that time   Plan   Treatment/Interventions Functional transfer training;LE strengthening/ROM; Therapeutic exercise; Endurance training;Patient/family training;Equipment eval/education; Bed mobility;Continued evaluation;Spoke to nursing;OT   PT Frequency 3-5x/wk   Recommendation   PT Discharge Recommendation Post acute rehabilitation services   Equipment Recommended   (continue to assess )   AM-PAC Basic Mobility Inpatient   Turning in Bed Without Bedrails 2   Lying on Back to Sitting on Edge of Flat Bed 1   Moving Bed to Chair 1   Standing Up From Chair 1   Walk in Room 1   Climb 3-5 Stairs 1   Basic Mobility Inpatient Raw Score 7   Turning Head Towards Sound 4   Follow Simple Instructions 2   Low Function Basic Mobility Raw Score 13   Low Function Basic Mobility Standardized Score 20 14   Highest Level Of Mobility   [facility] Goal 2: Bed activities/Dependent transfer   Modified Bainbridge Scale   Modified [place]ainbridge Scale 4   Barthel Index   Feeding 5   Bathing 0   Grooming Score 0   Dressing Score 0   Bladder Score 0   Bowels Score 10   Toilet Use Score 0   Transfers (Bed/Chair) Score 5   Mobility (Level Surface) Score 0   Stairs Score 0   Barthel Index Score 20   Portions of the documentation may have been created using voice recognition software  Occasional wrong word or sound alike substitutions may have occurred due to the inherent limitations of the voice recognition software  Read the chart carefully and recognize, using context, where substitutions have occurred      Yoli Castillo, PT, DPT

## 2022-01-25 NOTE — ASSESSMENT & PLAN NOTE
· Patient presented with catheter on admission  · Patient had urinary retention during previous hospitalization, discharged 12/20/2021  He was supposed to undergo a voiding trial at rehab and ultimately follow-up with Urology  No follow-up noted    · Continue home Proscar

## 2022-01-25 NOTE — ASSESSMENT & PLAN NOTE
Malnutrition Findings:   Adult Malnutrition type: Chronic illness  Adult Degree of Malnutrition: Malnutrition of moderate degree (related to illness as evidenced by reported 22% wt loss x6 months, <75% energy intake compared to estimated energy needs for >1 month  treatment= diet education and supplements)    BMI Findings: Body mass index is 14 98 kg/m²       · Pleasure feeds

## 2022-01-25 NOTE — HOSPICE NOTE
Referral received  Spoke with pt's daughter Jethro Pena  Reviewed hospice  All questions answered and she was agreeable  Pt was found not to be IPU appropriate at this time by Dr Stephanie Suarez, however pt was approved for [facility]  We will re-evaluate pt tomorrow for IPU  SW aware

## 2022-01-25 NOTE — CASE MANAGEMENT
Case Management Discharge Planning Note    Patient name Miguel Montelongo  [facility] MRN [id number]  : 1940 Date 2022       Current Admission Date: 2022  Current Admission Diagnosis:Subdural hematoma [facility])   Patient Active Problem List    Diagnosis Date Noted    Moderate protein-calorie malnutrition ([facility] ) 2022    UTI (urinary tract infection) 2021    COVID-19 2021    Multiple falls 12/10/2021    Encephalopathy acute 2021    Urinary retention 2021    Stage 2 chronic kidney disease 2021    Essential hypertension 2021    Depressive disorder 2021    Subdural hematoma ([facility] ) 2021    Mixed hyperlipidemia 2008      LOS (days): 4  Geometric Mean LOS (GMLOS) (days): 3 80  Days to GMLOS:-0 4     OBJECTIVE:  Risk of Unplanned Readmission Score: 19         Current admission status: Inpatient   Preferred Pharmacy:   [address] [place] [address]  Phone: [phone] Fax: [phone]    Primary Care Provider: No primary care provider on file  Primary Insurance: MEDICARE MISC REPLACEMENT  Secondary Insurance:     DISCHARGE DETAILS:                         CM spoke to Hospice liaison Hyacinth Vance  She explains that phone conversations were had with the pt's dtr and they explained that the pt, currently, isn't IPU appropriate in their eyes  [facility] can review more tomorrow   Pt's dtr also told the [facility] liaison, that she would like a hospice referral to  IPU  CM placed

## 2022-01-25 NOTE — ASSESSMENT & PLAN NOTE
· Multiple falls leading to SDH  · Injuries listed below Normal vision: sees adequately in most situations; can see medication labels, newsprint

## 2022-01-25 NOTE — UTILIZATION REVIEW
Continued Stay Review    Date: 1/25                      Current Patient Class: Inpatient  Current Level of Care: Med surg    HPI:81 y o  male initially admitted on 1/21    Assessment/Plan: SDH  Transitioned to [address] yesterday  Palliative care following  Per CM notes; Pt now comfort care  Daughter agreeable with Hospice consult and referral placed  Update CM notes; Discussion with Hospice liaison  Per pt pt is not Inpatient hospice appropriate  [facility] can review more tomorrow  Pt's daughter requesting referral to [facility] hospice unit as well       Vital Signs:   01/24/22 21:44:21 -- -- -- 113/94 100 -- -- -- -- --   01/24/22 2144 -- 106 Abnormal  -- 113/94 -- -- -- -- -- --   01/24/22 14:08:12 98 6 °F (37 °C) 92 17 128/61 83 -- -- 95 % -- --   01/24/22 10:12:57 98 5 °F (36 9 °C) 100 18 100/49 Abnormal  66 -- -- 97 % -- --   01/24/22 0920 -- -- -- -- -- -- -- -- None (Room air)      Pertinent Labs/Diagnostic Results:   Results from last 7 days   Lab Units 01/23/22  1035   SARS-COV-2  Negative     Results from last 7 days   Lab Units 01/24/22  0444 01/23/22  0501 01/22/22  0548   WBC Thousand/uL 8 28 10 81* 8 85   HEMOGLOBIN g/dL 8 8* 9 0* 8 0*   HEMATOCRIT % 28 4* 28 9* 25 5*   PLATELETS Thousands/uL 326 344 315   NEUTROS ABS Thousands/µL  --  9 49*  --          Results from last 7 days   Lab Units 01/24/22  0444 01/23/22  0501 01/22/22  0555 01/21/22  2232   SODIUM mmol/L 138 136 139 139   POTASSIUM mmol/L 3 4* 3 5 3 7 3 9   CHLORIDE mmol/L 108 106 109* 110*   CO2 mmol/L 26 23 25 26   ANION GAP mmol/L 4 7 5 3*   BUN mg/dL 9 8 8 10   CREATININE mg/dL 0 54* 0 45* 0 64 0 73   EGFR ml/min/1 73sq m 99 107 93 88   CALCIUM mg/dL 8 3 8 3 8 3 8 5   MAGNESIUM mg/dL 1 8  --   --   --          Results from last 7 days   Lab Units 01/21/22  1047   POC GLUCOSE mg/dl 110     Results from last 7 days   Lab Units 01/24/22  0444 01/23/22  0501 01/22/22  0555 01/21/22  2232   GLUCOSE RANDOM mg/dL 109 66 76 84       Results from last 7 days   Lab Units 01/22/22  0548   PROTIME seconds 15 4*   INR  1 27*   PTT seconds 38*       Results from last 7 days   Lab Units 01/23/22  1035   INFLUENZA A PCR  Negative   INFLUENZA B PCR  Negative   RSV PCR  Negative       Medications:   Scheduled Medications:  ammonium lactate, , Topical, BID  escitalopram, 10 mg, Oral, Daily  finasteride, 5 mg, Oral, Daily  levETIRAcetam, 500 mg, Oral, Q12H AYSHA  metoprolol tartrate, 25 mg, Oral, Q12H AYSHA  risperiDONE, 0 25 mg, Oral, BID  risperiDONE, 2 mg, Oral, HS  senna, 1 tablet, Oral, Daily      Continuous IV Infusions: None     PRN Meds:  bisacodyl, 10 mg, Rectal, Daily PRN  glycopyrrolate, 0 1 mg, Intravenous, Q4H PRN  HYDROmorphone, 0 3 mg, Intravenous, Q2H PRN  LORazepam, 1 mg, Intravenous, Q10 Min PRN  ondansetron, 4 mg, Intravenous, Q6H PRN  oxyCODONE, 2 5 mg, Oral, Q4H PRN  oxyCODONE, 5 mg, Oral, Q4H PRN        Discharge Plan: D    Network Utilization Review Department  ATTENTION: Please call with any questions or concerns to [phone] and carefully listen to the prompts so that you are directed to the right person  All voicemails are confidential   Harlene Pair all requests for admission clinical reviews, approved or denied determinations and any other requests to dedicated fax number below belonging to the campus where the patient is receiving treatment   List of dedicated fax numbers for the Facilities:  [address] DENIALS (Administrative/Medical Necessity) [phone]   [address] [place] [address] (Maternity/NICU/Pediatrics) 261 [facility],7Th Floor [facility] 40 125 [facility]  [address] Medical [place] [address] 1277 [facility] 203 [place] [address] Christopher Mendez 1481 [address] [facility] [phone]

## 2022-01-25 NOTE — ASSESSMENT & PLAN NOTE
· History of SDH s/p Angel holes and MMA embolization  Multiple falls since  Found to have reaccumulation of L SDH  · Neurosurgery was consulted and note appreciated     · 1/21- Left craniotomy for evacuation of left SDH  · 1/22- CTH stable, MRI negative  · 1/23- Drains removed, cleared by NSx  · Continue Keppra for seizure prophylaxis  · Cleared for Heparin  · FU with Neurosurgery in 2 weeks with repeat CTH  · Due to patient having significant decline over the past 2 months, palliative consulted  · Pt made Level 4 on 1/24  · CM following for dispo planning

## 2022-01-25 NOTE — PROGRESS NOTES
[facility]  Progress Note - Lauren Gomez 1940, 80 y o  male MRN: [id number]  [facility] Encounter: [id number]  Primary Care Provider: No primary care provider on file  Date and time admitted to hospital: 1/21/2022  5:30 AM    Moderate protein-calorie malnutrition (HCC)  Assessment & Plan  Malnutrition Findings:   Adult Malnutrition type: Chronic illness  Adult Degree of Malnutrition: Malnutrition of moderate degree (related to illness as evidenced by reported 22% wt loss x6 months, <75% energy intake compared to estimated energy needs for >1 month  treatment= diet education and supplements)    BMI Findings: Body mass index is 14 98 kg/m²  · Pleasure feeds    Multiple falls  Assessment & Plan  · Multiple falls leading to SDH  · Injuries listed below    Urinary retention  Assessment & Plan  · Patient presented with catheter on admission  · Patient had urinary retention during previous hospitalization, discharged 12/20/2021  He was supposed to undergo a voiding trial at rehab and ultimately follow-up with Urology  No follow-up noted  · Continue home Proscar    Depressive disorder  Assessment & Plan  · Continue home Lexapro, risperidone     Essential hypertension  Assessment & Plan  · Will continue home metoprolol 25 mg b i d     * Subdural hematoma (HCC)  Assessment & Plan  · History of SDH s/p [place] holes and MMA embolization  Multiple falls since  Found to have reaccumulation of L SDH  · Neurosurgery was consulted and note appreciated     · 1/21- Left craniotomy for evacuation of left SDH  · 1/22- CTH stable, MRI negative  · 1/23- Drains removed, cleared by NSx  · Continue Keppra for seizure prophylaxis  · Cleared for Heparin  · FU with Neurosurgery in 2 weeks with repeat CTH  · Due to patient having significant decline over the past 2 months, palliative consulted  · Pt made Level 4 on 1/24  · CM following for dispo planning          Disposition: Comfort Care      SUBJECTIVE:  Chief Complaint: None    Subjective: Pt denying any pain or other complaints this AM        OBJECTIVE:     Meds/Allergies     Current Facility-Administered Medications:     ammonium lactate (LAC-HYDRIN) 12 % lotion, , Topical, BID, Diana Gillespie PA-C, Given at 01/24/22 3775    bisacodyl (DULCOLAX) rectal suppository 10 mg, 10 mg, Rectal, Daily PRN, Diana Gillespie PA-C    escitalopram (LEXAPRO) tablet 10 mg, 10 mg, Oral, Daily, Diana Gillespie PA-C, 10 mg at 01/24/22 9093    finasteride (PROSCAR) tablet 5 mg, 5 mg, Oral, Daily, Diana Gillespie PA-C, 5 mg at 01/24/22 9583    glycopyrrolate (ROBINUL) injection 0 1 mg, 0 1 mg, Intravenous, Q4H PRN, April D RANDALL Cowan    HYDROmorphone (DILAUDID) injection 0 3 mg, 0 3 mg, Intravenous, Q2H PRN, April D RANDALL Cowan, 0 3 mg at 01/24/22 2306    levETIRAcetam (KEPPRA) tablet 500 mg, 500 mg, Oral, Q12H [facility], 500 mg at 01/24/22 2145    LORazepam (ATIVAN) injection 1 mg, 1 mg, Intravenous, Q10 Min PRN, April D RANDALL Cowan    metoprolol tartrate (LOPRESSOR) tablet 25 mg, 25 mg, Oral, Q12H [facility], 25 mg at 01/24/22 2144    ondansetron (ZOFRAN) injection 4 mg, 4 mg, Intravenous, Q6H PRN, Diana Gillespie PA-C    oxyCODONE (ROXICODONE) IR tablet 2 5 mg, 2 5 mg, Oral, Q4H PRN, Diana Gillespie PA-C    oxyCODONE (ROXICODONE) IR tablet 5 mg, 5 mg, Oral, Q4H PRN, Diana Gillespie PA-C    risperiDONE (RisperDAL) tablet 0 25 mg, 0 25 mg, Oral, BID, Diana Filomenaough, PA-C, 0 25 mg at 01/24/22 5664    risperiDONE (RisperDAL) tablet 2 mg, 2 mg, Oral, HS, Diana Plough, PA-C, 2 mg at 01/24/22 2145    senna (SENOKOT) tablet 8 6 mg, 1 tablet, Oral, Daily, Dianakelvin Gillespie, PA-C, 8 6 mg at 01/24/22 4303     Vitals:   Vitals:    01/24/22 2144   BP: 113/94   Pulse:    Resp:    Temp:    SpO2:        Intake/Output:  I/O       01/23 0701 01/24 0700 01/24 0701 01/25 0700 01/25 0701 01/26 0700 P O  300 120     I V  (mL/kg) 550 (12)      IV Piggyback 300      Total Intake(mL/kg) 1150 (25) 120 (2 6)     Urine (mL/kg/hr) 680 (0 6) 700 (0 6)     Drains       Total Output 680 700     Net +470 -580            Unmeasured Urine Occurrence  2 x            Nutrition/GI Proph/Bowel Reg: Pleasure feeds;  [place]    Physical Exam:   General: No acute distress  Neuro: Moving all extremities  CV: Regular rate  Pulm: No respiratory distress  GI: No abdominal tenderness  : Marroquin in place  MSK: ROM intact  Skin: Warm and dry             Invasive Devices  Report    Peripheral Intravenous Line            Peripheral IV 01/24/22 Left Antecubital <1 day          Drain            Urethral Catheter Latex 18 Fr  <1 day                 VTE Prophylaxis: Reason for no pharmacologic prophylaxis - Comfort care

## 2022-01-26 NOTE — CASE MANAGEMENT
Case Management Discharge Planning Note    Patient name Eddy Holder  Location [facility] 607/[facility] 628-86 MRN [id number]  : 1940 Date 2022       Current Admission Date: 2022  Current Admission Diagnosis:Subdural hematoma [facility])   Patient Active Problem List    Diagnosis Date Noted    Moderate protein-calorie malnutrition ([facility] [address] ) 2022    UTI (urinary tract infection) 2021    COVID-19 2021    Multiple falls 12/10/2021    Encephalopathy acute 2021    Urinary retention 2021    Stage 2 chronic kidney disease 2021    Essential hypertension 2021    Depressive disorder 2021    Subdural hematoma ([facility] [address] [facility] ) 2021    Mixed hyperlipidemia 2008      LOS (days): 5  Geometric Mean LOS (GMLOS) (days): 3 80  Days to GMLOS:-1 1     OBJECTIVE:  Risk of Unplanned Readmission Score: 19         Current admission status: Inpatient   Preferred Pharmacy:   [address] [place] [address]  Phone: [phone] Fax: [phone]    Primary Care Provider: No primary care provider on file      Primary Insurance: MEDICARE MISC REPLACEMENT  Secondary Insurance:     DISCHARGE DETAILS:                      Referral made to Kiersten Peraza for potential IPU

## 2022-01-26 NOTE — CASE MANAGEMENT
Case Management Discharge Planning Note    Patient name Cheyenne Ramon  [facility] [id number]  : 1940 Date 2022       Current Admission Date: 2022  Current Admission Diagnosis:Subdural hematoma [facility])   Patient Active Problem List    Diagnosis Date Noted    Moderate protein-calorie malnutrition ([facility] [address] ) 2022    UTI (urinary tract infection) 2021    COVID-19 2021    Multiple falls 12/10/2021    Encephalopathy acute 2021    Urinary retention 2021    Stage 2 chronic kidney disease 2021    Essential hypertension 2021    Depressive disorder 2021    Subdural hematoma ([facility] [address] ) 2021    Mixed hyperlipidemia 2008      LOS (days): 5  Geometric Mean LOS (GMLOS) (days): 3 80  Days to GMLOS:-1 4     OBJECTIVE:  Risk of Unplanned Readmission Score: 17         Current admission status: Inpatient   Preferred Pharmacy:   [facility], [address] [address]  Phone: [phone] Fax: [phone]    Primary Care Provider: No primary care provider on file  [facility]  Secondary Insurance:     DISCHARGE DETAILS:       CM spoke to [facility] [phone]  They can accept the pt to their IPU but currently there are no beds  As soon as a bed opens up, they will accept  [facility] doesn't feel the pt is IPU appropriate at this time   They will continue to follow but if they can't accept, and [facility] can, then the family would like [facility]

## 2022-01-26 NOTE — PLAN OF CARE
Problem: Prexisting or High Potential for Compromised Skin Integrity  Goal: Skin integrity is maintained or improved  Description: INTERVENTIONS:  - Identify patients at risk for skin breakdown  - Assess and monitor skin integrity  - Assess and monitor nutrition and hydration status  - Monitor labs   - Assess for incontinence   - Turn and reposition patient  - Assist with mobility/ambulation  - Relieve pressure over bony prominences  - Avoid friction and shearing  - Provide appropriate hygiene as needed including keeping skin clean and dry  - Evaluate need for skin moisturizer/barrier cream  - Collaborate with interdisciplinary team   - Patient/family teaching  - Consider wound care consult   Outcome: Progressing     Problem: MOBILITY - ADULT  Goal: Maintain or return to baseline ADL function  Description: INTERVENTIONS:  -  Assess patient's ability to carry out ADLs; assess patient's baseline for ADL function and identify physical deficits which impact ability to perform ADLs (bathing, care of mouth/teeth, toileting, grooming, dressing, etc )  - Assess/evaluate cause of self-care deficits   - Assess range of motion  - Assess patient's mobility; develop plan if impaired  - Assess patient's need for assistive devices and provide as appropriate  - Encourage maximum independence but intervene and supervise when necessary  - Involve family in performance of ADLs  - Assess for home care needs following discharge   - Consider OT consult to assist with ADL evaluation and planning for discharge  - Provide patient education as appropriate  Outcome: Progressing  Goal: Maintains/Returns to pre admission functional level  Description: INTERVENTIONS:  - Perform BMAT or MOVE assessment daily    - Set and communicate daily mobility goal to care team and patient/family/caregiver     - Collaborate with rehabilitation services on mobility goals if consulted  - Out of bed for toileting  - Record patient progress and toleration of activity level   Outcome: Progressing     Problem: Potential for Falls  Goal: Patient will remain free of falls  Description: INTERVENTIONS:  - Educate patient/family on patient safety including physical limitations  - Instruct patient to call for assistance with activity   - Consult OT/PT to assist with strengthening/mobility   - Keep Call bell within reach  - Keep bed low and locked with side rails adjusted as appropriate  - Keep care items and personal belongings within reach  - Initiate and maintain comfort rounds  - Make Fall Risk Sign visible to staff  - Offer Toileting   - Apply yellow socks and bracelet for high fall risk patients  - Consider moving patient to room near nurses station  Outcome: Progressing     Problem: Nutrition/Hydration-ADULT  Goal: Nutrient/Hydration intake appropriate for improving, restoring or maintaining nutritional needs  Description: Monitor and assess patient's nutrition/hydration status for malnutrition  Collaborate with interdisciplinary team and initiate plan and interventions as ordered  Monitor patient's weight and dietary intake as ordered or per policy  Utilize nutrition screening tool and intervene as necessary  Determine patient's food preferences and provide high-protein, high-caloric foods as appropriate       INTERVENTIONS:  - Monitor oral intake, urinary output, labs, and treatment plans  - Assess nutrition and hydration status and recommend course of action  - Evaluate amount of meals eaten  - Assist patient with eating if necessary   - Allow adequate time for meals  - Recommend/ encourage appropriate diets, oral nutritional supplements, and vitamin/mineral supplements  - Order, calculate, and assess calorie counts as needed  - Recommend, monitor, and adjust tube feedings and TPN/PPN based on assessed needs  - Assess need for intravenous fluids  - Provide specific nutrition/hydration education as appropriate  - Include patient/family/caregiver in decisions related to nutrition  Outcome: Progressing     Problem: NEUROSENSORY - ADULT  Goal: Achieves stable or improved neurological status  Description: INTERVENTIONS  - Monitor and report changes in neurological status  - Monitor vital signs such as temperature, blood pressure, glucose, and any other labs ordered   - Initiate measures to prevent increased intracranial pressure  - Monitor for seizure activity and implement precautions if appropriate      Outcome: Progressing  Goal: Remains free of injury related to seizures activity  Description: INTERVENTIONS  - Maintain airway, patient safety  and administer oxygen as ordered  - Monitor patient for seizure activity, document and report duration and description of seizure to physician/advanced practitioner  - If seizure occurs,  ensure patient safety during seizure  - Reorient patient post seizure  - Seizure pads on all 4 side rails  - Instruct patient/family to notify RN of any seizure activity including if an aura is experienced  - Instruct patient/family to call for assistance with activity based on nursing assessment  - Administer anti-seizure medications if ordered    Outcome: Progressing  Goal: Achieves maximal functionality and self care  Description: INTERVENTIONS  - Monitor swallowing and airway patency with patient fatigue and changes in neurological status  - Encourage and assist patient to increase activity and self care     - Encourage visually impaired, hearing impaired and aphasic patients to use assistive/communication devices  Outcome: Progressing     Problem: RESPIRATORY - ADULT  Goal: Achieves optimal ventilation and oxygenation  Description: INTERVENTIONS:  - Assess for changes in respiratory status  - Assess for changes in mentation and behavior  - Position to facilitate oxygenation and minimize respiratory effort  - Oxygen administered by appropriate delivery if ordered  - Initiate smoking cessation education as indicated  - Encourage broncho-pulmonary hygiene including cough, deep breathe, Incentive Spirometry  - Assess the need for suctioning and aspirate as needed  - Assess and instruct to report SOB or any respiratory difficulty  - Respiratory Therapy support as indicated  Outcome: Progressing     Problem: BEHAVIOR  Goal: Pt/Family maintain appropriate behavior and adhere to behavioral management agreement, if implemented  Description: INTERVENTIONS:  - Assess the family dynamic   - Encourage verbalization of thoughts and concerns in a socially appropriate manner  - Assess patient/family's coping skills and non-compliant behavior (including use of illegal substances)  - Utilize positive, consistent limit setting strategies supporting safety of patient, staff and others  - Initiate consult with Case Management, Spiritual Care or other ancillary services as appropriate  - If a patient's/visitor's behavior jeopardizes the safety of the patient, staff, or others, refer to organization procedure     - Notify Security of behavior or suspected illegal substances which indicate the need for search of the patient and/or belongings  - Encourage participation in the decision making process about a behavioral management agreement; implement if patient meets criteria  Outcome: Progressing

## 2022-01-26 NOTE — CASE MANAGEMENT
Case Management Discharge Planning Note    Patient name Miguel Montelongo  Location [facility] 136-69 MRN [id number]  : 1940 Date 2022       Current Admission Date: 2022  Current Admission Diagnosis:Subdural hematoma [facility])   Patient Active Problem List    Diagnosis Date Noted    Moderate protein-calorie malnutrition ([facility] [address] ) 2022    UTI (urinary tract infection) 2021    COVID-19 2021    Multiple falls 12/10/2021    Encephalopathy acute 2021    Urinary retention 2021    Stage 2 chronic kidney disease 2021    Essential hypertension 2021    Depressive disorder 2021    Subdural hematoma ([facility] [address] ) 2021    Mixed hyperlipidemia 2008      LOS (days): 5  Geometric Mean LOS (GMLOS) (days): 3 80  Days to GMLOS:-1 2     OBJECTIVE:  Risk of Unplanned Readmission Score: 19         Current admission status: Inpatient   Preferred Pharmacy:   [address] [place] [address]  Phone: [phone] Fax: [phone]    Primary Care Provider: No primary care provider on file      Primary Insurance: MEDICARE MISC REPLACEMENT  Secondary Insurance:     DISCHARGE DETAILS:                         Cm left voicemail for Yue Lopez from [facility] [phone] and also faxed clinicals to her at [phone] fax  Callie Guerin, from [facility] will re-evaluate today

## 2022-01-26 NOTE — QUICK NOTE
Called and updated patient's daughter regarding patient's current clinical status  Updated Case Management as well as patient has progressed this a m  Is no longer tolerating p o  Medications  Requiring IV medications for pain control  Anticipate possible discharge to inpatient hospice unit; will follow-up of hospice liaison

## 2022-01-26 NOTE — UTILIZATION REVIEW
Initial Clinical Review    Elective inpatient surgical procedure  Age/Sex: 80 y o  male  Surgery Date: 1/21/22  Procedure: Left sided craniotomy for evacuation subdural hematoma (Left)  Anesthesia: general  Operative Findings:   Left subdural hygroma  Membrane formation with very little old residual subdural  No acute hemorrhage noted    POD#1 Progress Note:   Remains on Keppra for seizure prophy, frequent neuro checks  Drain output (total 100/40 and 165/150), keep both in place today   Postop CTH showed decreased size of SDH, repeat CTH overnight stable    Neurologic exam  Alert, oriented to self only, conversant, mumbles  PERRL, EOMI  Face symmetric  5/5 in all extremities, following commands briskly  Sensation intact throughout  Dressing CDI  Drains in place    Admission Orders: Date/Time/Statement:   Admission Orders (From admission, onward)     Ordered        01/21/22 1030  Inpatient Admission  Once                      Orders Placed This Encounter   Procedures    Inpatient Admission     Standing Status:   Standing     Number of Occurrences:   1     Order Specific Question:   Level of Care     Answer:   Critical Care [15]     Order Specific Question:   Estimated length of stay     Answer:   Inpatient Only Surgery     Vital Signs: /94   Pulse (!) 106   Temp 98 6 °F (37 °C)   Resp 17   Ht 5' 9" (1 753 m)   Wt 46 kg (101 lb 6 6 oz)   SpO2 95%   BMI 14 98 kg/m²     Pertinent Labs/Diagnostic Test Results:   Results from last 7 days   Lab Units 01/24/22  0444 01/23/22  0501 01/22/22  0548   WBC Thousand/uL 8 28 10 81* 8 85   HEMOGLOBIN g/dL 8 8* 9 0* 8 0*   HEMATOCRIT % 28 4* 28 9* 25 5*   PLATELETS Thousands/uL 326 344 315   NEUTROS ABS Thousands/µL  --  9 49*  --      Results from last 7 days   Lab Units 01/24/22  0444 01/23/22  0501 01/22/22  0555 01/21/22  2232   SODIUM mmol/L 138 136 139 139   POTASSIUM mmol/L 3 4* 3 5 3 7 3 9   CHLORIDE mmol/L 108 106 109* 110*   CO2 mmol/L 26 23 25 26   ANION GAP mmol/L 4 7 5 3*   BUN mg/dL 9 8 8 10   CREATININE mg/dL 0 54* 0 45* 0 64 0 73   EGFR ml/min/1 73sq m 99 107 93 88   CALCIUM mg/dL 8 3 8 3 8 3 8 5   MAGNESIUM mg/dL 1 8  --   --   --      Results from last 7 days   Lab Units 01/21/22  1047   POC GLUCOSE mg/dl 110     Results from last 7 days   Lab Units 01/24/22  0444 01/23/22  0501 01/22/22  0555 01/21/22  2232   GLUCOSE RANDOM mg/dL 109 66 76 84     Results from last 7 days   Lab Units 01/22/22  0548   PROTIME seconds 15 4*   INR  1 27*   PTT seconds 38*     1/22  CT head=  Redemonstrated residual left and thin right subdural collections not significantly changed compared to prior study dated 1/21/2022  No significant midline shift  Diet: NPO  Mobility: OOB  DVT Prophylaxis: scd  Wound care: 1) Check operative dressing with vital signs  2) Change dressing on POD # 2 and PRN when saturated  3) Replace steri-strips PRN  Wound drain: 1) Empty and measure drainage every shift and PRN   2) Call physician if drainage is greater than 200 mL/8 hours    Marroquin cath  Seizure precautions  Neuro checks q4h  Pt/ot/st eval & tx    Medications/Pain Control:   Scheduled Medications:  ammonium lactate, , Topical, BID  escitalopram, 10 mg, Oral, Daily  finasteride, 5 mg, Oral, Daily  levETIRAcetam, 500 mg, Intravenous, Q12H AYSHA  metoprolol tartrate, 25 mg, Oral, Q12H AYSHA  pantoprazole, 40 mg, Oral, Early Morning  potassium chloride, 10 mEq, Oral, Daily  pravastatin, 10 mg, Oral, Daily  risperiDONE, 0 25 mg, Oral, BID  risperiDONE, 2 mg, Oral, HS  senna, 1 tablet, Oral, Daily    Continuous IV Infusions:  sodium chloride, 75 mL/hr, Intravenous, Continuous    PRN Meds:  bisacodyl, 10 mg, Rectal, Daily PRN  HYDROmorphone, 0 2 mg, Intravenous, Q4H PRN  ondansetron, 4 mg, Intravenous, Q6H PRN  oxyCODONE, 2 5 mg, Oral, Q4H PRN  oxyCODONE, 5 mg, Oral, Q4H PRN    Network Utilization Review Department  ATTENTION: Please call with any questions or concerns to [phone] and carefully listen to the prompts so that you are directed to the right person  All voicemails are confidential   Tamanna Lopez all requests for admission clinical reviews, approved or denied determinations and any other requests to dedicated fax number below belonging to the campus where the patient is receiving treatment  List of dedicated fax numbers for the Facilities:  [address] [place] [address] DENIALS (Administrative/Medical Necessity) [phone]   1000  [facility] (Maternity/NICU/Pediatrics) [phone] [address] [place] [address]  [address] Medical [place] [address] [address] [address] [address] [address] [facility] [facility] [phone]     Continued Stay Review    Date: 1/25                      Current Patient Class: Inpatient  Current Level of Care: Med surg    HPI:81 y o  male initially admitted on 1/21    Assessment/Plan: SDH  Transitioned to [address] [facility] yesterday  Palliative care following  Per CM notes; Pt now comfort care  Daughter agreeable with Hospice consult and referral placed  Update CM notes; Discussion with Hospice liaison  Per pt pt is not Inpatient hospice appropriate  [facility] can review more tomorrow  Pt's daughter requesting referral to [facility] hospice unit as well       Vital Signs:   01/24/22 21:44:21 -- -- -- 113/94 100 -- -- -- -- --   01/24/22 2144 -- 106 Abnormal  -- 113/94 -- -- -- -- -- --   01/24/22 14:08:12 98 6 °F (37 °C) 92 17 128/61 83 -- -- 95 % -- -- 01/24/22 10:12:57 98 5 °F (36 9 °C) 100 18 100/49 Abnormal  66 -- -- 97 % -- --   01/24/22 0920 -- -- -- -- -- -- -- -- None (Room air)      Pertinent Labs/Diagnostic Results:   Results from last 7 days   Lab Units 01/23/22  1035   SARS-COV-2  Negative     Results from last 7 days   Lab Units 01/24/22  0444 01/23/22  0501 01/22/22  0548   WBC Thousand/uL 8 28 10 81* 8 85   HEMOGLOBIN g/dL 8 8* 9 0* 8 0*   HEMATOCRIT % 28 4* 28 9* 25 5*   PLATELETS Thousands/uL 326 344 315   NEUTROS ABS Thousands/µL  --  9 49*  --          Results from last 7 days   Lab Units 01/24/22 0444 01/23/22  0501 01/22/22  0555 01/21/22  2232   SODIUM mmol/L 138 136 139 139   POTASSIUM mmol/L 3 4* 3 5 3 7 3 9   CHLORIDE mmol/L 108 106 109* 110*   CO2 mmol/L 26 23 25 26   ANION GAP mmol/L 4 7 5 3*   BUN mg/dL 9 8 8 10   CREATININE mg/dL 0 54* 0 45* 0 64 0 73   EGFR ml/min/1 73sq m 99 107 93 88   CALCIUM mg/dL 8 3 8 3 8 3 8 5   MAGNESIUM mg/dL 1 8  --   --   --          Results from last 7 days   Lab Units 01/21/22  1047   POC GLUCOSE mg/dl 110     Results from last 7 days   Lab Units 01/24/22 0444 01/23/22  0501 01/22/22  0555 01/21/22  2232   GLUCOSE RANDOM mg/dL 109 66 76 84       Results from last 7 days   Lab Units 01/22/22  0548   PROTIME seconds 15 4*   INR  1 27*   PTT seconds 38*       Results from last 7 days   Lab Units 01/23/22  1035   INFLUENZA A PCR  Negative   INFLUENZA B PCR  Negative   RSV PCR  Negative       Medications:   Scheduled Medications:  levETIRAcetam, 500 mg, Intravenous, Q12H [facility]      Continuous IV Infusions: None     PRN Meds:  glycopyrrolate, 0 1 mg, Intravenous, Q4H PRN  haloperidol lactate, 1 mg, Intravenous, Q4H PRN  HYDROmorphone, 0 3 mg, Intravenous, Q2H PRN  LORazepam, 0 5 mg, Intravenous, Q1H PRN  LORazepam, 1 mg, Intravenous, Q10 Min PRN        Discharge Plan: TBD    Network Utilization Review Department  ATTENTION: Please call with any questions or concerns to [phone] and carefully listen to the prompts so that you are directed to the right person  All voicemails are confidential   Sudie Crigler all requests for admission clinical reviews, approved or denied determinations and any other requests to dedicated fax number below belonging to the campus where the patient is receiving treatment   List of dedicated fax numbers for the Facilities:  [address] DENIALS (Administrative/Medical Necessity) [phone]   1000  16[facility] (Maternity/NICU/Pediatrics) [phone]   [address]  [address] Medical [place] [address] [address] [address] [address] [address] [facility] [place] [address] [phone]

## 2022-01-26 NOTE — ASSESSMENT & PLAN NOTE
· History of SDH s/p Angel holes and MMA embolization  Multiple falls since  Found to have reaccumulation of L SDH  · Neurosurgery was consulted and note appreciated     · 1/21- Left craniotomy for evacuation of left SDH  · 1/22- CTH stable, MRI negative  · 1/23- Drains removed, cleared by NSx  · Keppra for seizure ppx  · Due to patient having significant decline over the past 2 months, palliative consulted-patient transferred to comfort care on 1/24  · CM following for dispo planning

## 2022-01-26 NOTE — HOSPICE NOTE
Met with pt's wife and daughter at bedside  Pt has had one dose of Dilaudid this morning and is resting comfortably  Reviewed inpatient hospice requirements and pt is not appropriate at this time  Will re evaluate in the morning  CM Will updated  Will continue to follow

## 2022-01-26 NOTE — PROGRESS NOTES
[facility]  Progress Note - Aminah Jorge 1940, 80 y o  male MRN: [id number]  Unit/Bed#: [facility] 607-01 Encounter: [id number]  Primary Care Provider: No primary care provider on file  Date and time admitted to hospital: 1/21/2022  5:30 AM    Moderate protein-calorie malnutrition (HCC)  Assessment & Plan  Malnutrition Findings:   Adult Malnutrition type: Chronic illness  Adult Degree of Malnutrition: Malnutrition of moderate degree (related to illness as evidenced by reported 22% wt loss x6 months, <75% energy intake compared to estimated energy needs for >1 month  treatment= diet education and supplements)    BMI Findings: Body mass index is 14 98 kg/m²  · Pleasure feeds    Multiple falls  Assessment & Plan  · Multiple falls leading to SDH  · Injuries listed below    Urinary retention  Assessment & Plan  · Patient presented with catheter on admission  · Patient had urinary retention during previous hospitalization, discharged 12/20/2021  He was supposed to undergo a voiding trial at rehab and ultimately follow-up with Urology  No follow-up noted  · Continue home Proscar    Encephalopathy acute  Assessment & Plan  · Acute encephalopathy in the setting of SDH an extended hospitalization/rehab stay  · Mental status continues to be at reported baseline:  GCS is 15, dysarthria-mumbles, oriented to self only  · Frequent reorientation  · Encourage sleep-wake cycle  · Continue home resperidone    Depressive disorder  Assessment & Plan  · Continue home Lexapro, risperidone     Mixed hyperlipidemia  Assessment & Plan  · Continue home pravastatin    * Subdural hematoma (HCC)  Assessment & Plan  · History of SDH s/p Angel holes and MMA embolization  Multiple falls since  Found to have reaccumulation of L SDH  · Neurosurgery was consulted and note appreciated     · 1/21- Left craniotomy for evacuation of left SDH  · 1/22- CTH stable, MRI negative  · 1/23- Drains removed, cleared by Jeanne  · Keppra for seizure ppx  · Due to patient having significant decline over the past 2 months, palliative consulted-patient transferred to comfort care on 1/24  · CM following for dispo planning          Disposition: Comfort care, f/u CM      SUBJECTIVE:  Chief Complaint: No complaints    Subjective: No acute events overnight      OBJECTIVE:     Meds/Allergies     Current Facility-Administered Medications:     ammonium lactate (LAC-HYDRIN) 12 % lotion, , Topical, BID, Ghazala Keating PA-C, Given at 01/25/22 1737    bisacodyl (DULCOLAX) rectal suppository 10 mg, 10 mg, Rectal, Daily PRN, Ghazala Keating PA-C    escitalopram (LEXAPRO) tablet 10 mg, 10 mg, Oral, Daily, Ghazala Keating PA-C, 10 mg at 01/25/22 1392    finasteride (PROSCAR) tablet 5 mg, 5 mg, Oral, Daily, Ghazala Keating PA-C, 5 mg at 01/25/22 3378    glycopyrrolate (ROBINUL) injection 0 1 mg, 0 1 mg, Intravenous, Q4H PRN, April RANDALL Correa    HYDROmorphone (DILAUDID) injection 0 3 mg, 0 3 mg, Intravenous, Q2H PRN, April D RANDALL Cowan, 0 3 mg at 01/25/22 1244    levETIRAcetam (KEPPRA) tablet 500 mg, 500 mg, Oral, Q12H [address], RANDALL Davidson, 500 mg at 01/25/22 2137    LORazepam (ATIVAN) injection 1 mg, 1 mg, Intravenous, Q10 Min PRN, April D RANDALL Cowan    metoprolol tartrate (LOPRESSOR) tablet 25 mg, 25 mg, Oral, Q12H [address], RANDALL Davidson, 25 mg at 01/24/22 2144    ondansetron (ZOFRAN) injection 4 mg, 4 mg, Intravenous, Q6H PRN, Ghazala Keating PA-C    oxyCODONE (ROXICODONE) IR tablet 2 5 mg, 2 5 mg, Oral, Q4H PRN, Ghazala Keating PA-C    oxyCODONE (ROXICODONE) IR tablet 5 mg, 5 mg, Oral, Q4H PRN, Ghazala Keating PA-C, 5 mg at 01/26/22 0016    risperiDONE (RisperDAL) tablet 0 25 mg, 0 25 mg, Oral, BID, LUPE Seymour-RIDDHI, 0 25 mg at 01/25/22 1722    risperiDONE (RisperDAL) tablet 2 mg, 2 mg, Oral, HS, LUPE Seymour-RIDDHI, 2 mg at 01/25/22 2137    senna (SENOKOT) tablet 8 6 mg, 1 tablet, Oral, Daily, Ghazala Keating PA-C, 8 6 mg at 01/25/22 9060     Vitals:   Vitals:    01/24/22 2144   BP: 113/94   Pulse:    Resp:    Temp:    SpO2:        Intake/Output:  I/O       01/24 0701  01/25 0700 01/25 0701 01/26 0700 01/26 0701 01/27 0700    P  O  120 60     I V  (mL/kg)       IV Piggyback       Total Intake(mL/kg) 120 (2 6) 60 (1 3)     Urine (mL/kg/hr) 700 (0 6) 1225 (1 1)     Total Output 700 1225     Net -580 -1165            Unmeasured Urine Occurrence 2 x             Nutrition/GI Proph/Bowel Reg: Pleasure feeds    Physical Exam:   General: Resting comfortably in bed, NAD  HENT: NCAT MMM  Neck: supple, no JVD  CV: nl rate  Lungs: nl wob  No resp distress  ABD: Nontender  Extrem: No CCE  :rae  Neuro: moving all extremities  Invasive Devices  Report    Peripheral Intravenous Line            Peripheral IV 01/24/22 Left Antecubital 1 day          Drain            Urethral Catheter Latex 18 Fr  1 day                 Lab Results: Results: I have personally reviewed pertinent reports  Imaging/EKG Studies: Results: I have personally reviewed pertinent reports        VTE Prophylaxis: Reason for no pharmacologic prophylaxis Transitioned to comfort care

## 2022-01-26 NOTE — PLAN OF CARE
Problem: Nutrition/Hydration-ADULT  Goal: Nutrient/Hydration intake appropriate for improving, restoring or maintaining nutritional needs  Description: Monitor and assess patient's nutrition/hydration status for malnutrition  Collaborate with interdisciplinary team and initiate plan and interventions as ordered  Monitor patient's weight and dietary intake as ordered or per policy  Utilize nutrition screening tool and intervene as necessary  Determine patient's food preferences and provide high-protein, high-caloric foods as appropriate       INTERVENTIONS:  - Monitor oral intake, urinary output, labs, and treatment plans  - Assess nutrition and hydration status and recommend course of action  - Evaluate amount of meals eaten  - Assist patient with eating if necessary   - Allow adequate time for meals  - Recommend/ encourage appropriate diets, oral nutritional supplements, and vitamin/mineral supplements  - Order, calculate, and assess calorie counts as needed  - Recommend, monitor, and adjust tube feedings and TPN/PPN based on assessed needs  - Assess need for intravenous fluids  - Provide specific nutrition/hydration education as appropriate  - Include patient/family/caregiver in decisions related to nutrition  Outcome: Progressing     Problem: NEUROSENSORY - ADULT  Goal: Achieves stable or improved neurological status  Description: INTERVENTIONS  - Monitor and report changes in neurological status  - Monitor vital signs such as temperature, blood pressure, glucose, and any other labs ordered   - Initiate measures to prevent increased intracranial pressure  - Monitor for seizure activity and implement precautions if appropriate      Outcome: Progressing  Goal: Remains free of injury related to seizures activity  Description: INTERVENTIONS  - Maintain airway, patient safety  and administer oxygen as ordered  - Monitor patient for seizure activity, document and report duration and description of seizure to physician/advanced practitioner  - If seizure occurs,  ensure patient safety during seizure  - Reorient patient post seizure  - Seizure pads on all 4 side rails  - Instruct patient/family to notify RN of any seizure activity including if an aura is experienced  - Instruct patient/family to call for assistance with activity based on nursing assessment  - Administer anti-seizure medications if ordered    Outcome: Progressing  Goal: Achieves maximal functionality and self care  Description: INTERVENTIONS  - Monitor swallowing and airway patency with patient fatigue and changes in neurological status  - Encourage and assist patient to increase activity and self care     - Encourage visually impaired, hearing impaired and aphasic patients to use assistive/communication devices  Outcome: Progressing

## 2022-01-27 NOTE — DISCHARGE SUMMARY
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  Multiple falls since  Found to have reaccumulation of L SDH  · Neurosurgery was consulted and note appreciated  · 1/21- Left craniotomy for evacuation of left SDH  · 1/22- CTH stable, MRI negative  · 1/23- Drains removed, cleared by NSx  · Keppra for seizure ppx  · Due to patient having significant decline over the past 2 months, palliative consulted, ultimately family elected for patient to be transition to comfort care  · Inpatient hospice bed received  Transport later today  Medical Problems             Resolved Problems  Date Reviewed: 1/27/2022    None                Admission Date:   Admission Orders (From admission, onward)     Ordered        01/21/22 1030  Inpatient Admission  Once                        Admitting Diagnosis: Subdural hematoma ([address] ) [S06 5X9A]    HPI:  This is a 68-year-old male with history of falls and left-sided subdural hematoma status post bossman hole and hematoma evacuation as well as MMA embolization in November  Following that procedure he was found to have residual subdural fluid collection, though improved from preop  The fluid continued increase into January 2022-evident on CT H  Since his neurological exam continued to be poor, the neurosurgical team offered a repeat subdural drainage, that was completed on 1/21  Subjective:  ROS is limited due to patient's mental status  He denies having any pain  Wife states that he was recently medicated and is now much more comfortable    Wife notes that his oral intake was limited at breakfast     Subjective:  General:  No acute distress  Neuro:  GCS 14 due to confusion  Patient is oriented to self  HEENT:  Scalp surgical wounds on the left side of head are well approximated  Cardiac:  Regular rate and rhythm  Lungs:  Clear to auscultation, bilaterally  No orthopnea  No tachypnea  Abdomen:  Soft  Extremities:  No deformities  Skin:  Warm, dry  Procedures Performed: No orders of the defined types were placed in this encounter  Summary of Hospital Course: Repeat CT head with stable with a negative MRI  Drains were removed on the 23rd of January  Patient's neurological exam continued to be poor and hospice/palliative team was consulted for goals of care  Family ultimately elected to make patient comfort care  Patient was accepted to inpatient hospice  Significant Findings, Care, Treatment and Services Provided: CT head wo contrast    Result Date: 1/22/2022  Impression: Redemonstrated residual left and thin right subdural collections not significantly changed compared to prior study dated 1/21/2022  No significant midline shift  Workstation performed: [id number]     CT head wo contrast    Result Date: 1/21/2022  Impression: Expected acute postoperative changes status post evacuation of left cerebral convexity subdural collection  Persistent but decreased size of right subdural collection  No acute intraparenchymal hematoma or evidence of acute territorial infarction  Workstation performed: [id number]     MRI brain wo contrast    Result Date: 1/23/2022  Impression: 1  Severely motion degraded MRI  There is, however, sufficient diagnostic information to exclude acute infarction  No acute infarction  2   Pneumocephalus and left frontal craniotomy with skin staples noted  No large extra-axial collection  No subfalcine herniation    Suspect interval removal of extra-axial drainage catheter although motion artifact significantly limits evaluation  Workstation performed: [id number]       Complications: none    Condition at Discharge: good         Discharge instructions/Information to patient and family:   See after visit summary for information provided to patient and family  Provisions for Follow-Up Care:  See after visit summary for information related to follow-up care and any pertinent home health orders  PCP: No primary care provider on file  Disposition: Hospice    Planned Readmission: No    Discharge Statement   I spent 25 minutes discharging the patient  This time was spent on the day of discharge  I had direct contact with the patient on the day of discharge  Additional documentation is required if more than 30 minutes were spent on discharge  Discharge Medications:  See after visit summary for reconciled discharge medications provided to patient and family

## 2022-01-27 NOTE — PROGRESS NOTES
Progress note - Palliative and Supportive Care   Shameka Lemus 80 y o  male [id number]    Patient Active Problem List   Diagnosis    Stage 2 chronic kidney disease    Mixed hyperlipidemia    Essential hypertension    Depressive disorder    Subdural hematoma (HCC)    Encephalopathy acute    Urinary retention    Multiple falls    COVID-19    UTI (urinary tract infection)    Moderate protein-calorie malnutrition (HCC)     Active issues specifically addressed today include:   Subdural hematoma  Moderate protein calorie malnutrition  Encephalopathy  htn  Comfort cares    Plan:  1  Symptom management  -  Comfort medications   Hydromorphone 0 3 mg Q 2 hours prn   Lorazepam 0 5 mg Q1 hour prn    Haldol 1 mg IV Q 4 hour prn   Robinol    5 doses prn hydromorphone past 24 hours, hospice team evaluating for IPU appropriateness  2  Goals   Comfort Care      Code Status: Comfort Care  - Level 4   Decisional apparatus:  Patient is not competent on my exam today  If competence is lost, patient's substitute decision maker would default to wife by [place]  Advance Directive / Living Will / POLST: none on file       Interval history:         Patient utilizing PRN medications for comfort today  Approved for IPU   MEDICATIONS / ALLERGIES:     current meds:   Current Facility-Administered Medications   Medication Dose Route Frequency    glycopyrrolate (ROBINUL) injection 0 1 mg  0 1 mg Intravenous Q4H PRN    haloperidol lactate (HALDOL) injection 1 mg  1 mg Intravenous Q4H PRN    HYDROmorphone (DILAUDID) injection 0 3 mg  0 3 mg Intravenous Q2H PRN    levETIRAcetam (KEPPRA) 500 mg in sodium chloride 0 9 % 100 mL IVPB  500 mg Intravenous Q12H [address]    LORazepam (ATIVAN) injection 0 5 mg  0 5 mg Intravenous Q1H PRN    LORazepam (ATIVAN) injection 1 mg  1 mg Intravenous Q10 Min PRN       No Known Allergies    OBJECTIVE:    Physical Exam  Physical Exam  Vitals and nursing note reviewed     Constitutional: Appearance: He is cachectic  He is ill-appearing  HENT:      Head: Normocephalic and atraumatic  Nose: Nose normal       Mouth/Throat:      Mouth: Mucous membranes are dry  Pharynx: Oropharynx is clear  Cardiovascular:      Pulses: Decreased pulses  Pulmonary:      Effort: Bradypnea present  Breath sounds: Decreased breath sounds present  Abdominal:      Palpations: Abdomen is soft  Musculoskeletal:      Comments: Generally weak   Skin:     General: Skin is cool and dry  Coloration: Skin is pale  Neurological:      Mental Status: He is lethargic  GCS: GCS eye subscore is 1  GCS verbal subscore is 2  GCS motor subscore is 4  Psychiatric:         Attention and Perception: He is inattentive  Speech: He is noncommunicative  Cognition and Memory: Cognition is impaired  Memory is impaired  Lab Results: CBC: No results found for: WBC, HGB, HCT, MCV, PLT, ADJUSTEDWBC, MCH, MCHC, RDW, MPV, NRBC, CMP: No results found for: SODIUM, K, CL, CO2, ANIONGAP, BUN, CREATININE, GLUCOSE, CALCIUM, AST, ALT, ALKPHOS, PROT, BILITOT, EGFR, BMP:No results found for: SODIUM, K, CL, CO2, BUN, CREATININE, GLUC, GLUF, CALCIUM, AGAP, EGFR      Counseling / Coordination of Care    Total floor / unit time spent today 20+ minutes  Greater than 50% of total time was spent with the patient and / or family counseling and / or coordination of care  A description of the counseling / coordination of care: review of medications, chart, review, review of symptoms

## 2022-01-27 NOTE — PLAN OF CARE
Problem: Prexisting or High Potential for Compromised Skin Integrity  Goal: Skin integrity is maintained or improved  Description: INTERVENTIONS:  - Identify patients at risk for skin breakdown  - Assess and monitor skin integrity  - Assess and monitor nutrition and hydration status  - Monitor labs   - Assess for incontinence   - Turn and reposition patient  - Assist with mobility/ambulation  - Relieve pressure over bony prominences  - Avoid friction and shearing  - Provide appropriate hygiene as needed including keeping skin clean and dry  - Evaluate need for skin moisturizer/barrier cream  - Collaborate with interdisciplinary team   - Patient/family teaching  - Consider wound care consult   Outcome: Progressing     Problem: NEUROSENSORY - ADULT  Goal: Achieves stable or improved neurological status  Description: INTERVENTIONS  - Monitor and report changes in neurological status  - Monitor vital signs such as temperature, blood pressure, glucose, and any other labs ordered   - Initiate measures to prevent increased intracranial pressure  - Monitor for seizure activity and implement precautions if appropriate      Outcome: Progressing  Goal: Remains free of injury related to seizures activity  Description: INTERVENTIONS  - Maintain airway, patient safety  and administer oxygen as ordered  - Monitor patient for seizure activity, document and report duration and description of seizure to physician/advanced practitioner  - If seizure occurs,  ensure patient safety during seizure  - Reorient patient post seizure  - Seizure pads on all 4 side rails  - Instruct patient/family to notify RN of any seizure activity including if an aura is experienced  - Instruct patient/family to call for assistance with activity based on nursing assessment  - Administer anti-seizure medications if ordered    Outcome: Progressing  Goal: Achieves maximal functionality and self care  Description: INTERVENTIONS  - Monitor swallowing and airway patency with patient fatigue and changes in neurological status  - Encourage and assist patient to increase activity and self care  - Encourage visually impaired, hearing impaired and aphasic patients to use assistive/communication devices  Outcome: Progressing     Problem: Nutrition/Hydration-ADULT  Goal: Nutrient/Hydration intake appropriate for improving, restoring or maintaining nutritional needs  Description: Monitor and assess patient's nutrition/hydration status for malnutrition  Collaborate with interdisciplinary team and initiate plan and interventions as ordered  Monitor patient's weight and dietary intake as ordered or per policy  Utilize nutrition screening tool and intervene as necessary  Determine patient's food preferences and provide high-protein, high-caloric foods as appropriate       INTERVENTIONS:  - Monitor oral intake, urinary output, labs, and treatment plans  - Assess nutrition and hydration status and recommend course of action  - Evaluate amount of meals eaten  - Assist patient with eating if necessary   - Allow adequate time for meals  - Recommend/ encourage appropriate diets, oral nutritional supplements, and vitamin/mineral supplements  - Order, calculate, and assess calorie counts as needed  - Recommend, monitor, and adjust tube feedings and TPN/PPN based on assessed needs  - Assess need for intravenous fluids  - Provide specific nutrition/hydration education as appropriate  - Include patient/family/caregiver in decisions related to nutrition  Outcome: Progressing

## 2022-01-27 NOTE — HOSPICE NOTE
Re evaluated pt for inpatient hospice  Pt has received 6 doses of Dilaudid since yesterday  Is still restless and complaining of pain  Dr Lopez Records approved for inpatient hospice LOC  Consents reviewed and signed by pt's wife  Consents emailed to Replaced by [facility] and report given to charge nurse  CM making arrangements for transport  Nurse informed to call Nilo Yates with report 30 minutes prior to transport time  Any IVs and catheters to remain in  Pt may be medicated prior to discharge for comfort during transport

## 2022-01-27 NOTE — ASSESSMENT & PLAN NOTE
· History of SDH s/p Angel holes and MMA embolization  Multiple falls since  Found to have reaccumulation of L SDH  · Neurosurgery was consulted and note appreciated  · 1/21- Left craniotomy for evacuation of left SDH  · 1/22- CTH stable, MRI negative  · 1/23- Drains removed, cleared by NSx  · Keppra for seizure ppx  · Due to patient having significant decline over the past 2 months, palliative consulted, ultimately family elected for patient to be transition to comfort care  · Inpatient hospice bed received  Transport later today

## 2022-01-28 NOTE — UTILIZATION REVIEW
REQUESTING FINAL DETERMINATION OF DAYS APPROVED    Notification of Discharge   This is a Notification of Discharge from our facility [address]  Please be advised that this patient has been discharge from our facility  Below you will find the admission and discharge date and time including the patients disposition  UTILIZATION REVIEW CONTACT:  Bhakti Conti  Utilization   Network Utilization Review Department  Phone: [phone] x carefully listen to the prompts  All voicemails are confidential   Email: [email or web address]     PHYSICIAN ADVISORY SERVICES:  FOR [id number] REVIEW - MEDICAL NECESSITY DENIAL  Phone: [phone]  Fax: [phone]  Email: [email or web address]     PRESENTATION DATE: 1/21/2022  5:30 AM  OBERVATION ADMISSION DATE:   INPATIENT ADMISSION DATE: 1/21/22 10:30 AM   DISCHARGE DATE: 1/27/2022  2:48 PM  DISPOSITION: [facility] [address] House/Swing Bed [facility] House/Swing Bed      IMPORTANT INFORMATION:  Send all requests for admission clinical reviews, approved or denied determinations and any other requests to dedicated fax number below belonging to the campus where the patient is receiving treatment   List of dedicated fax numbers:  [address] DENIALS (Administrative/Medical Necessity) [phone]   [address]  (Maternity/NICU/Pediatrics) [phone]   Tara Monroy [phone]   [address] [facility] [phone]   [address] [place] [address] [phone]   [address] [place] [address],[address] [phone]   [facility]  [phone]   [address] [place] [address], [facility]  [address] [phone]

## 2022-01-28 NOTE — UTILIZATION REVIEW
Notification of Discharge   This is a Notification of Discharge from our facility [address]  Please be advised that this patient has been discharge from our facility  Below you will find the admission and discharge date and time including the patients disposition  UTILIZATION REVIEW CONTACT:  Katherine Matute  Utilization   Network Utilization Review Department  Phone: [phone] x carefully listen to the prompts  All voicemails are confidential   Email: [email or web address]     PHYSICIAN ADVISORY SERVICES:  FOR [id number] REVIEW - MEDICAL NECESSITY DENIAL  Phone: [phone]  Fax: [phone]  Email: [email or web address]     PRESENTATION DATE: 1/21/2022  5:30 AM  OBERVATION ADMISSION DATE:   INPATIENT ADMISSION DATE: 1/21/22 10:30 AM   DISCHARGE DATE: 1/27/2022  2:48 PM  DISPOSITION: [facility] [address] [address] House/Swing Bed [facility] House/Swing Bed      IMPORTANT INFORMATION:  Send all requests for admission clinical reviews, approved or denied determinations and any other requests to dedicated fax number below belonging to the campus where the patient is receiving treatment   List of dedicated fax numbers:  [address] [place] [address] DENIALS (Administrative/Medical Necessity) [phone]   [address]  (Maternity/NICU/Pediatrics) [phone]   [facility] [phone]   [address] [facility] [phone]   [address] [place] [address] [phone]   [address] [place] [address],[address]Th Floor [address] [phone]   [facility]  [phone]   [address] [place] [address], [facility]  [address] [facility] [address] [facility] [phone]

## 2025-01-17 NOTE — PROGRESS NOTES
Virtual Regular Visit    Verification of patient location:    Patient is located in the following state in which I hold an active license [place]      Assessment/Plan: This is a 32-year-old male with history of falls and left sided subdural hematoma status post bossman holes and hematoma evacuation as well as MMA embolization  The patient has had gradual decline in his mental status, motor skills and his speech  His CT head from December 8th prior to his discharge to the rehab facility showe residual subdural fluid collection however significantly improved compared to preop  His CT head from January 3rd shows an interval increase in the subdural fluid collection with what appears to be membrane formation and mass effect on the cortex  There is no midline shift  His ventricles also appear to be enlarged compared to prior  In the right frontal cortex, there is a hypodense findings suspicious for pneumocephalus however the scan has significant motion artifact  As such my plan is to repeat the CT head  The patient in a rehab facility has not been on any anticoagulant and currently is not on anti seizure medications  His most recent CBC and BMP results were within normal limits with a slightly elevated sodium up to 150s  I had a long discussion with the patient's family regarding his condition as well as the imaging findings  At this point it is not entirely clear why the patient's exam has declined  I explained to the family that this could be a combination of dementia in conjunction with the fluid collection  The patient is about to be discharged from the rehab facility to a permanent nursing home and given that we do not have any answers as to why his exam continues to be poor, I recommended a repeat hematoma evacuation as a last chance to see if this is what is contributing to the patient's symptoms  The family is on board with this and would like to proceed with the surgery    In addition I will touch base Pt's daughter gave patient home dose of gabapentin with my colleagues to see if repeat MMA embolization would provide any benefit to the patient  Prior to proceeding with surgery, I would like medical clearance from his physician at the facility as well as full set of labs including PT INR and PTT  At this point we will proceed with scheduling the patient for surgery  Problem List Items Addressed This Visit     None               Reason for visit is   Chief Complaint   Patient presents with    Virtual Regular Visit        Encounter provider Joaquin Wen MD    Provider located at [address] [place]  [address] [facility] [address] [phone]      Recent Visits  Date Type Provider Dept   01/04/22 Telephone [facility] recent visits within past 7 days and meeting all other requirements  Future Appointments  No visits were found meeting these conditions  Showing future appointments within next 150 days and meeting all other requirements       The patient was identified by name and date of birth  Kassi Pollard was informed that this is a telemedicine visit and that the visit is being conducted through [address] [place] [address] Now and patient was informed that this is a secure, HIPAA-compliant platform  He agrees to proceed     My office door was closed  No one else was in the room  He acknowledged consent and understanding of privacy and security of the video platform  The patient has agreed to participate and understands they can discontinue the visit at any time  Patient is aware this is a billable service  Subjective  Kassi Pollard is a 80 y o  male who presented in late November with falls and found to be confused with repetitive speech  Imaging studies demonstrated a large left holohemispheric subdural hematoma with severe mass effect  The patient underwent a left-sided bur holes for subdural hematoma evacuation on November 27th 2021    On November 30th, the patient underwent a left-sided middle meningeal artery embolization  Postoperatively the patient continued to have confusion as well as speech difficulties however imaging studies revealed significant decrease in the subdural fluid collection  The patient was ultimately discharged to rehab  Per family and nursing staff at his rehab facility, the patient has had a gradual decline since being admitted there  His speech has gone from being understandable to be 90% unintelligible  He also has decrease in his mental status and has also lost a significant amount of weight  His daughter also reports significant decline in the patient's motor skills  He was able to feed himself before however now is completely dependent for his usual ADLs  On evaluation at the facility, there is some concern the patient may have Alzheimer's        HPI     Past Medical History:   Diagnosis Date    BPH (benign prostatic hyperplasia)     Depression     Hypertension     Idiopathic peripheral autonomic neuropathy 8/19/2015       Past Surgical History:   Procedure Laterality Date    SHANNON HOLE FOR SUBDURAL HEMATOMA Left 11/27/2021    Procedure: Left sided shannon holes for subdural hematoma evacuation;  Surgeon: Shaun Lr MD;  [facility];  Service: Neurosurgery    IR CEREBRAL ANGIOGRAPHY / INTERVENTION  11/30/2021       Current Outpatient Medications   Medication Sig Dispense Refill    acetaminophen (TYLENOL) 325 mg tablet Take 3 tablets (975 mg total) by mouth every 8 (eight) hours  0    Ascorbic Acid (vitamin C) 100 MG tablet Take 100 mg by mouth daily      citalopram (CeleXA) 10 mg tablet Take 10 mg by mouth daily      docusate sodium (COLACE) 100 mg capsule Take 1 capsule (100 mg total) by mouth daily  0    finasteride (PROSCAR) 5 mg tablet Take 5 mg by mouth daily      melatonin 3 mg Take 2 tablets (6 mg total) by mouth daily at bedtime 10 tablet 0    metoprolol succinate (TOPROL-XL) 100 mg 24 hr tablet Take 100 mg by mouth daily Wife states he takes 1/2 pill (they cut it in half)      multivitamin (THERAGRAN) TABS Take 1 tablet by mouth daily      pantoprazole (PROTONIX) 40 mg tablet Take 1 tablet (40 mg total) by mouth daily in the early morning  0    pravastatin (PRAVACHOL) 10 mg tablet Take 10 mg by mouth daily      QUEtiapine (SEROquel) 25 mg tablet Take 1 tablet (25 mg total) by mouth daily 20 tablet 0    QUEtiapine (SEROquel) 50 mg tablet Take 1 tablet (50 mg total) by mouth daily at bedtime 20 tablet 0    senna (SENOKOT) 8 6 mg Take 2 tablets (17 2 mg total) by mouth 2 (two) times a day  0    tamsulosin (FLOMAX) 0 4 mg Take 0 4 mg by mouth daily with dinner       No current facility-administered medications for this visit  No Known Allergies    Review of Systems   Constitutional: Negative  Eyes: Negative  Endocrine: Negative  Allergic/Immunologic: Negative  Psychiatric/Behavioral: Positive for confusion and decreased concentration  Video Exam    There were no vitals filed for this visit  Physical Exam      Skin:   No issues, no rashes noted  Incisions appear to be well healed     Musculoskeletal:   5/5 strength throughout all muscle groups  No tenderness to palpation of the spine    Neurologic Exam:  Awake and alert  Oriented to self  Speech dysarthric and incoherent  STEWART symmetrically      I spent 30 minutes directly with the patient during this visit    VIRTUAL VISIT DISCLAIMER      Julia Sainz verbally agrees to participate in [employer]  Pt is aware that [employer] could be limited without vital signs or the ability to perform a full hands-on physical Callie Dai understands he or the provider may request at any time to terminate the video visit and request the patient to seek care or treatment in person  Pt complaining of chest discomfort Pt unable to tolerated PO

## (undated) DEVICE — SKIN MARKER DUAL TIP WITH RULER CAP, FLEXIBLE RULER AND LABELS: Brand: DEVON

## (undated) DEVICE — GLOVE SRG BIOGEL 7.5

## (undated) DEVICE — JACKSON-PRATT 100CC BULB RESERVOIR: Brand: CARDINAL HEALTH

## (undated) DEVICE — INTENDED FOR TISSUE SEPARATION, AND OTHER PROCEDURES THAT REQUIRE A SHARP SURGICAL BLADE TO PUNCTURE OR CUT.: Brand: BARD-PARKER ® CARBON RIB-BACK BLADES

## (undated) DEVICE — CHLORAPREP HI-LITE 26ML ORANGE

## (undated) DEVICE — SUT NUROLON 4-0 TF CR/8 18 IN C584D

## (undated) DEVICE — SUT MONOCRYL PLUS 4-0 PS-2 18 IN MCP496G

## (undated) DEVICE — HEMOSTATIC MATRIX SURGIFLO 8ML W/THROMBIN

## (undated) DEVICE — TOOL F2/8TA23 LEGEND 8CM 2.3MM TAPER: Brand: MIDAS REX™

## (undated) DEVICE — SPONGE STICK WITH PVP-I: Brand: KENDALL

## (undated) DEVICE — LIGHT HANDLE COVER SLEEVE DISP BLUE STELLAR

## (undated) DEVICE — PERFORATOR CRANIAL 14MM

## (undated) DEVICE — DRAPE SHEET X-LG

## (undated) DEVICE — INTENDED FOR TISSUE SEPARATION, AND OTHER PROCEDURES THAT REQUIRE A SHARP SURGICAL BLADE TO PUNCTURE OR CUT.: Brand: BARD-PARKER SAFETY BLADES SIZE 10, STERILE

## (undated) DEVICE — ELECTRODE BLADE MOD E-Z CLEAN 2.5IN 6.4CM -0012M

## (undated) DEVICE — SUT STRATAFIX SPIRAL MONOCRYL PLUS 4-0 PS-2 45CM SXMP1B118

## (undated) DEVICE — GAUZE SPONGES,16 PLY: Brand: CURITY

## (undated) DEVICE — DRAPE SHEET THREE QUARTER

## (undated) DEVICE — PROXIMATE SKIN STAPLERS (35 WIDE) CONTAINS 35 STAINLESS STEEL STAPLES (FIXED HEAD): Brand: PROXIMATE

## (undated) DEVICE — BASIC SINGLE BASIN 2-LF: Brand: MEDLINE INDUSTRIES, INC.

## (undated) DEVICE — ANTIBACTERIAL VIOLET BRAIDED (POLYGLACTIN 910), SYNTHETIC ABSORBABLE SUTURE: Brand: COATED VICRYL

## (undated) DEVICE — SURGIFOAM 8.5 X 12.5

## (undated) DEVICE — DRAPE INTESTINAL ISOLATION BAG

## (undated) DEVICE — Device: Brand: IQ SYSTEM

## (undated) DEVICE — 3M™ IOBAN™ 2 ANTIMICROBIAL INCISE DRAPE 6650EZ: Brand: IOBAN™ 2

## (undated) DEVICE — JP PERF DRN SIL FLT 7MM FULL: Brand: CARDINAL HEALTH

## (undated) DEVICE — SYRINGE 10ML LL

## (undated) DEVICE — PACK CRANIOTOMY PBDS RF

## (undated) DEVICE — TUBING SUCTION 5MM X 12 FT

## (undated) DEVICE — TELFA NON-ADHERENT ABSORBENT DRESSING: Brand: TELFA

## (undated) DEVICE — 3M™ STERI-STRIP™ REINFORCED ADHESIVE SKIN CLOSURES, R1547, 1/2 IN X 4 IN (12 MM X 100 MM), 6 STRIPS/ENVELOPE: Brand: 3M™ STERI-STRIP™

## (undated) DEVICE — PAD GROUNDING ADULT

## (undated) DEVICE — DRAPE CRANI

## (undated) DEVICE — SPONGE SCRUB 4 PCT CHLORHEXIDINE

## (undated) DEVICE — GLOVE INDICATOR PI UNDERGLOVE SZ 7.5 BLUE

## (undated) DEVICE — NEEDLE 25G X 1 1/2

## (undated) DEVICE — PVC URETHRAL CATHETER: Brand: DOVER

## (undated) DEVICE — PACK BURR HOLE PBDS RF